# Patient Record
Sex: FEMALE | Race: WHITE | NOT HISPANIC OR LATINO | Employment: OTHER | RURAL
[De-identification: names, ages, dates, MRNs, and addresses within clinical notes are randomized per-mention and may not be internally consistent; named-entity substitution may affect disease eponyms.]

---

## 2020-08-28 ENCOUNTER — HISTORICAL (OUTPATIENT)
Dept: ADMINISTRATIVE | Facility: HOSPITAL | Age: 65
End: 2020-08-28

## 2021-05-18 RX ORDER — HYDROCODONE BITARTRATE AND ACETAMINOPHEN 10; 325 MG/1; MG/1
1 TABLET ORAL EVERY 8 HOURS
COMMUNITY
End: 2021-11-17

## 2021-05-18 RX ORDER — MONTELUKAST SODIUM 10 MG/1
10 TABLET ORAL NIGHTLY
COMMUNITY
End: 2021-06-07 | Stop reason: SDUPTHER

## 2021-05-18 RX ORDER — FUROSEMIDE 40 MG/1
40 TABLET ORAL DAILY
COMMUNITY
End: 2021-06-07 | Stop reason: SDUPTHER

## 2021-05-18 RX ORDER — CITALOPRAM 40 MG/1
40 TABLET, FILM COATED ORAL DAILY
COMMUNITY
End: 2021-06-07 | Stop reason: SDUPTHER

## 2021-05-18 RX ORDER — ROSUVASTATIN CALCIUM 40 MG/1
10 TABLET, COATED ORAL NIGHTLY
COMMUNITY
End: 2021-06-10

## 2021-05-18 RX ORDER — NAPROXEN SODIUM 220 MG/1
325 TABLET, FILM COATED ORAL DAILY
COMMUNITY
End: 2021-06-07

## 2021-05-18 RX ORDER — CETIRIZINE HYDROCHLORIDE 10 MG/1
10 TABLET ORAL DAILY
COMMUNITY
End: 2021-12-13 | Stop reason: SDUPTHER

## 2021-05-18 RX ORDER — MECLIZINE HYDROCHLORIDE 25 MG/1
25 TABLET ORAL 4 TIMES DAILY
COMMUNITY
End: 2021-12-13 | Stop reason: SDUPTHER

## 2021-05-18 RX ORDER — POTASSIUM CHLORIDE 20 MEQ/1
20 TABLET, EXTENDED RELEASE ORAL DAILY
COMMUNITY
End: 2021-06-07 | Stop reason: SDUPTHER

## 2021-05-18 RX ORDER — RANOLAZINE 1000 MG/1
500 TABLET, EXTENDED RELEASE ORAL 2 TIMES DAILY
COMMUNITY
End: 2021-12-13 | Stop reason: SDUPTHER

## 2021-05-18 RX ORDER — LISINOPRIL AND HYDROCHLOROTHIAZIDE 20; 25 MG/1; MG/1
1 TABLET ORAL DAILY
COMMUNITY
End: 2021-06-07 | Stop reason: SDUPTHER

## 2021-05-18 RX ORDER — CYCLOBENZAPRINE HCL 10 MG
10 TABLET ORAL EVERY 8 HOURS
COMMUNITY
End: 2021-06-28 | Stop reason: SDUPTHER

## 2021-05-18 RX ORDER — CLOPIDOGREL BISULFATE 75 MG/1
75 TABLET ORAL DAILY
COMMUNITY
End: 2021-06-07 | Stop reason: SDUPTHER

## 2021-05-18 RX ORDER — GABAPENTIN 600 MG/1
600 TABLET ORAL EVERY 6 HOURS
COMMUNITY
End: 2021-06-28 | Stop reason: SDUPTHER

## 2021-05-18 RX ORDER — CARVEDILOL 25 MG/1
25 TABLET ORAL 2 TIMES DAILY WITH MEALS
COMMUNITY
End: 2021-06-07 | Stop reason: SDUPTHER

## 2021-05-20 ENCOUNTER — OFFICE VISIT (OUTPATIENT)
Dept: PAIN MEDICINE | Facility: CLINIC | Age: 66
End: 2021-05-20
Payer: MEDICARE

## 2021-05-20 VITALS
RESPIRATION RATE: 18 BRPM | SYSTOLIC BLOOD PRESSURE: 158 MMHG | HEIGHT: 63 IN | BODY MASS INDEX: 29.95 KG/M2 | OXYGEN SATURATION: 98 % | WEIGHT: 169 LBS | HEART RATE: 68 BPM | DIASTOLIC BLOOD PRESSURE: 79 MMHG

## 2021-05-20 DIAGNOSIS — M54.17 LUMBOSACRAL RADICULOPATHY: Chronic | ICD-10-CM

## 2021-05-20 DIAGNOSIS — Z79.899 ENCOUNTER FOR LONG-TERM (CURRENT) USE OF OTHER MEDICATIONS: ICD-10-CM

## 2021-05-20 DIAGNOSIS — M54.12 CERVICAL RADICULOPATHY: Primary | Chronic | ICD-10-CM

## 2021-05-20 LAB
CTP QC/QA: YES
POC (AMP) AMPHETAMINE: NEGATIVE
POC (BAR) BARBITURATES: NEGATIVE
POC (BUP) BUPRENORPHINE: NEGATIVE
POC (BZO) BENZODIAZEPINES: NEGATIVE
POC (COC) COCAINE: NEGATIVE
POC (MDMA) METHYLENEDIOXYMETHAMPHETAMINE 3,4: NEGATIVE
POC (MET) METHAMPHETAMINE: NEGATIVE
POC (MOP) OPIATES: ABNORMAL
POC (MTD) METHADONE: NEGATIVE
POC (OXY) OXYCODONE: NEGATIVE
POC (PCP) PHENCYCLIDINE: NEGATIVE
POC (TCA) TRICYCLIC ANTIDEPRESSANTS: NEGATIVE
POC TEMPERATURE (URINE): 92
POC THC: NEGATIVE

## 2021-05-20 PROCEDURE — 80305 DRUG TEST PRSMV DIR OPT OBS: CPT | Mod: PBBFAC | Performed by: PHYSICIAN ASSISTANT

## 2021-05-20 PROCEDURE — 3008F BODY MASS INDEX DOCD: CPT | Mod: CPTII,,, | Performed by: PHYSICIAN ASSISTANT

## 2021-05-20 PROCEDURE — 99214 PR OFFICE/OUTPT VISIT, EST, LEVL IV, 30-39 MIN: ICD-10-PCS | Mod: S$PBB,,, | Performed by: PHYSICIAN ASSISTANT

## 2021-05-20 PROCEDURE — 99214 OFFICE O/P EST MOD 30 MIN: CPT | Mod: S$PBB,,, | Performed by: PHYSICIAN ASSISTANT

## 2021-05-20 PROCEDURE — 1125F AMNT PAIN NOTED PAIN PRSNT: CPT | Mod: ,,, | Performed by: PHYSICIAN ASSISTANT

## 2021-05-20 PROCEDURE — 3008F PR BODY MASS INDEX (BMI) DOCUMENTED: ICD-10-PCS | Mod: CPTII,,, | Performed by: PHYSICIAN ASSISTANT

## 2021-05-20 PROCEDURE — 99214 OFFICE O/P EST MOD 30 MIN: CPT | Mod: PBBFAC | Performed by: PHYSICIAN ASSISTANT

## 2021-05-20 PROCEDURE — 1125F PR PAIN SEVERITY QUANTIFIED, PAIN PRESENT: ICD-10-PCS | Mod: ,,, | Performed by: PHYSICIAN ASSISTANT

## 2021-05-20 RX ORDER — HYDROCODONE BITARTRATE AND ACETAMINOPHEN 10; 325 MG/1; MG/1
1 TABLET ORAL EVERY 8 HOURS
Qty: 90 TABLET | Refills: 0 | Status: SHIPPED | OUTPATIENT
Start: 2021-07-28 | End: 2021-08-27

## 2021-05-20 RX ORDER — HYDROCODONE BITARTRATE AND ACETAMINOPHEN 10; 325 MG/1; MG/1
1 TABLET ORAL EVERY 8 HOURS
Qty: 90 TABLET | Refills: 0 | Status: SHIPPED | OUTPATIENT
Start: 2021-06-28 | End: 2021-07-28

## 2021-05-20 RX ORDER — HYDROCODONE BITARTRATE AND ACETAMINOPHEN 10; 325 MG/1; MG/1
1 TABLET ORAL EVERY 8 HOURS
Qty: 90 TABLET | Refills: 0 | Status: SHIPPED | OUTPATIENT
Start: 2021-05-29 | End: 2021-06-28

## 2021-06-07 ENCOUNTER — OFFICE VISIT (OUTPATIENT)
Dept: PRIMARY CARE CLINIC | Facility: CLINIC | Age: 66
End: 2021-06-07
Payer: MEDICARE

## 2021-06-07 VITALS
BODY MASS INDEX: 28.88 KG/M2 | HEIGHT: 63 IN | OXYGEN SATURATION: 97 % | SYSTOLIC BLOOD PRESSURE: 105 MMHG | RESPIRATION RATE: 18 BRPM | DIASTOLIC BLOOD PRESSURE: 69 MMHG | HEART RATE: 61 BPM | WEIGHT: 163 LBS | TEMPERATURE: 98 F

## 2021-06-07 DIAGNOSIS — J30.2 SEASONAL ALLERGIES: ICD-10-CM

## 2021-06-07 DIAGNOSIS — E78.5 HYPERLIPIDEMIA, UNSPECIFIED HYPERLIPIDEMIA TYPE: ICD-10-CM

## 2021-06-07 DIAGNOSIS — I10 IDIOPATHIC HYPERTENSION: Primary | ICD-10-CM

## 2021-06-07 DIAGNOSIS — F32.A DEPRESSION, UNSPECIFIED DEPRESSION TYPE: ICD-10-CM

## 2021-06-07 DIAGNOSIS — R13.10 DYSPHAGIA, UNSPECIFIED TYPE: ICD-10-CM

## 2021-06-07 LAB
ALBUMIN SERPL BCP-MCNC: 3.5 G/DL (ref 3.5–5)
ALBUMIN/GLOB SERPL: 1.1 {RATIO}
ALP SERPL-CCNC: 58 U/L (ref 50–130)
ALT SERPL W P-5'-P-CCNC: 17 U/L (ref 13–56)
ANION GAP SERPL CALCULATED.3IONS-SCNC: 8 MMOL/L (ref 7–16)
AST SERPL W P-5'-P-CCNC: 14 U/L (ref 15–37)
BASOPHILS # BLD AUTO: 0.08 K/UL (ref 0–0.2)
BASOPHILS NFR BLD AUTO: 0.9 % (ref 0–1)
BILIRUB SERPL-MCNC: 0.3 MG/DL (ref 0–1.2)
BUN SERPL-MCNC: 8 MG/DL (ref 7–18)
BUN/CREAT SERPL: 10 (ref 6–20)
CALCIUM SERPL-MCNC: 8.8 MG/DL (ref 8.5–10.1)
CHLORIDE SERPL-SCNC: 101 MMOL/L (ref 98–107)
CHOLEST SERPL-MCNC: 112 MG/DL (ref 0–200)
CHOLEST/HDLC SERPL: 2.9 {RATIO}
CO2 SERPL-SCNC: 30 MMOL/L (ref 21–32)
CREAT SERPL-MCNC: 0.79 MG/DL (ref 0.55–1.02)
DIFFERENTIAL METHOD BLD: ABNORMAL
EOSINOPHIL # BLD AUTO: 0.05 K/UL (ref 0–0.5)
EOSINOPHIL NFR BLD AUTO: 0.5 % (ref 1–4)
ERYTHROCYTE [DISTWIDTH] IN BLOOD BY AUTOMATED COUNT: 13.9 % (ref 11.5–14.5)
GLOBULIN SER-MCNC: 3.2 G/DL (ref 2–4)
GLUCOSE SERPL-MCNC: 108 MG/DL (ref 74–106)
HCT VFR BLD AUTO: 43.5 % (ref 38–47)
HDLC SERPL-MCNC: 39 MG/DL (ref 40–60)
HGB BLD-MCNC: 14.6 G/DL (ref 12–16)
IMM GRANULOCYTES # BLD AUTO: 0.04 K/UL (ref 0–0.04)
IMM GRANULOCYTES NFR BLD: 0.4 % (ref 0–0.4)
LDLC SERPL CALC-MCNC: 42 MG/DL
LDLC/HDLC SERPL: 1.1 {RATIO}
LYMPHOCYTES # BLD AUTO: 3.53 K/UL (ref 1–4.8)
LYMPHOCYTES NFR BLD AUTO: 38.5 % (ref 27–41)
MCH RBC QN AUTO: 31 PG (ref 27–31)
MCHC RBC AUTO-ENTMCNC: 33.6 G/DL (ref 32–36)
MCV RBC AUTO: 92.4 FL (ref 80–96)
MONOCYTES # BLD AUTO: 0.66 K/UL (ref 0–0.8)
MONOCYTES NFR BLD AUTO: 7.2 % (ref 2–6)
MPC BLD CALC-MCNC: 10.8 FL (ref 9.4–12.4)
NEUTROPHILS # BLD AUTO: 4.82 K/UL (ref 1.8–7.7)
NEUTROPHILS NFR BLD AUTO: 52.5 % (ref 53–65)
NONHDLC SERPL-MCNC: 73 MG/DL
NRBC # BLD AUTO: 0 X10E3/UL
NRBC, AUTO (.00): 0 %
PLATELET # BLD AUTO: 239 K/UL (ref 150–400)
POTASSIUM SERPL-SCNC: 3.3 MMOL/L (ref 3.5–5.1)
PROT SERPL-MCNC: 6.7 G/DL (ref 6.4–8.2)
RBC # BLD AUTO: 4.71 M/UL (ref 4.2–5.4)
SODIUM SERPL-SCNC: 136 MMOL/L (ref 136–145)
TRIGL SERPL-MCNC: 155 MG/DL (ref 35–150)
VLDLC SERPL-MCNC: 31 MG/DL
WBC # BLD AUTO: 9.18 K/UL (ref 4.5–11)

## 2021-06-07 PROCEDURE — 80061 LIPID PANEL: CPT | Mod: ,,, | Performed by: CLINICAL MEDICAL LABORATORY

## 2021-06-07 PROCEDURE — 3008F BODY MASS INDEX DOCD: CPT | Mod: ,,, | Performed by: NURSE PRACTITIONER

## 2021-06-07 PROCEDURE — 3074F SYST BP LT 130 MM HG: CPT | Mod: ,,, | Performed by: NURSE PRACTITIONER

## 2021-06-07 PROCEDURE — 80061 LIPID PANEL: ICD-10-PCS | Mod: ,,, | Performed by: CLINICAL MEDICAL LABORATORY

## 2021-06-07 PROCEDURE — 80053 COMPREHENSIVE METABOLIC PANEL: ICD-10-PCS | Mod: ,,, | Performed by: CLINICAL MEDICAL LABORATORY

## 2021-06-07 PROCEDURE — 3078F PR MOST RECENT DIASTOLIC BLOOD PRESSURE < 80 MM HG: ICD-10-PCS | Mod: ,,, | Performed by: NURSE PRACTITIONER

## 2021-06-07 PROCEDURE — 3008F PR BODY MASS INDEX (BMI) DOCUMENTED: ICD-10-PCS | Mod: ,,, | Performed by: NURSE PRACTITIONER

## 2021-06-07 PROCEDURE — 85025 COMPLETE CBC W/AUTO DIFF WBC: CPT | Mod: ,,, | Performed by: CLINICAL MEDICAL LABORATORY

## 2021-06-07 PROCEDURE — 3078F DIAST BP <80 MM HG: CPT | Mod: ,,, | Performed by: NURSE PRACTITIONER

## 2021-06-07 PROCEDURE — 80053 COMPREHEN METABOLIC PANEL: CPT | Mod: ,,, | Performed by: CLINICAL MEDICAL LABORATORY

## 2021-06-07 PROCEDURE — 3074F PR MOST RECENT SYSTOLIC BLOOD PRESSURE < 130 MM HG: ICD-10-PCS | Mod: ,,, | Performed by: NURSE PRACTITIONER

## 2021-06-07 PROCEDURE — 99214 OFFICE O/P EST MOD 30 MIN: CPT | Mod: ,,, | Performed by: NURSE PRACTITIONER

## 2021-06-07 PROCEDURE — 85025 CBC WITH DIFFERENTIAL: ICD-10-PCS | Mod: ,,, | Performed by: CLINICAL MEDICAL LABORATORY

## 2021-06-07 PROCEDURE — 99214 PR OFFICE/OUTPT VISIT, EST, LEVL IV, 30-39 MIN: ICD-10-PCS | Mod: ,,, | Performed by: NURSE PRACTITIONER

## 2021-06-07 RX ORDER — CARVEDILOL 25 MG/1
25 TABLET ORAL 2 TIMES DAILY WITH MEALS
Qty: 180 TABLET | Refills: 1 | Status: SHIPPED | OUTPATIENT
Start: 2021-06-07 | End: 2021-11-01

## 2021-06-07 RX ORDER — ROSUVASTATIN CALCIUM 10 MG/1
10 TABLET, COATED ORAL DAILY
COMMUNITY
End: 2021-06-07 | Stop reason: SDUPTHER

## 2021-06-07 RX ORDER — LISINOPRIL AND HYDROCHLOROTHIAZIDE 20; 25 MG/1; MG/1
1 TABLET ORAL DAILY
Qty: 90 TABLET | Refills: 1 | Status: SHIPPED | OUTPATIENT
Start: 2021-06-07 | End: 2021-12-13 | Stop reason: SDUPTHER

## 2021-06-07 RX ORDER — LISINOPRIL 10 MG/1
10 TABLET ORAL DAILY
Qty: 90 TABLET | Refills: 1 | Status: SHIPPED | OUTPATIENT
Start: 2021-06-07 | End: 2022-03-17 | Stop reason: SDUPTHER

## 2021-06-07 RX ORDER — CITALOPRAM 40 MG/1
40 TABLET, FILM COATED ORAL DAILY
Qty: 90 TABLET | Refills: 1 | Status: SHIPPED | OUTPATIENT
Start: 2021-06-07 | End: 2021-11-01

## 2021-06-07 RX ORDER — ASPIRIN 325 MG
325 TABLET ORAL DAILY
COMMUNITY
End: 2023-04-12

## 2021-06-07 RX ORDER — LISINOPRIL 10 MG/1
10 TABLET ORAL DAILY
COMMUNITY
End: 2021-06-07 | Stop reason: SDUPTHER

## 2021-06-07 RX ORDER — MONTELUKAST SODIUM 10 MG/1
10 TABLET ORAL NIGHTLY
Qty: 90 TABLET | Refills: 1 | Status: SHIPPED | OUTPATIENT
Start: 2021-06-07 | End: 2021-11-01

## 2021-06-07 RX ORDER — FUROSEMIDE 40 MG/1
40 TABLET ORAL DAILY
Qty: 90 TABLET | Refills: 1 | Status: SHIPPED | OUTPATIENT
Start: 2021-06-07 | End: 2021-11-01

## 2021-06-07 RX ORDER — POTASSIUM CHLORIDE 20 MEQ/1
20 TABLET, EXTENDED RELEASE ORAL DAILY
Qty: 90 TABLET | Refills: 1 | Status: SHIPPED | OUTPATIENT
Start: 2021-06-07 | End: 2021-11-01

## 2021-06-07 RX ORDER — CLOPIDOGREL BISULFATE 75 MG/1
75 TABLET ORAL DAILY
Qty: 90 TABLET | Refills: 1 | Status: SHIPPED | OUTPATIENT
Start: 2021-06-07 | End: 2021-12-13 | Stop reason: SDUPTHER

## 2021-06-09 ENCOUNTER — TELEPHONE (OUTPATIENT)
Dept: PRIMARY CARE CLINIC | Facility: CLINIC | Age: 66
End: 2021-06-09

## 2021-06-10 RX ORDER — ROSUVASTATIN CALCIUM 40 MG/1
TABLET, COATED ORAL
Qty: 90 TABLET | Refills: 0 | Status: SHIPPED | OUTPATIENT
Start: 2021-06-10 | End: 2021-12-13 | Stop reason: SDUPTHER

## 2021-06-28 RX ORDER — CYCLOBENZAPRINE HCL 10 MG
10 TABLET ORAL EVERY 8 HOURS
Qty: 90 TABLET | Refills: 0 | Status: SHIPPED | OUTPATIENT
Start: 2021-06-28 | End: 2021-07-28 | Stop reason: SDUPTHER

## 2021-06-28 RX ORDER — GABAPENTIN 600 MG/1
600 TABLET ORAL EVERY 6 HOURS
Qty: 120 TABLET | Refills: 0 | Status: SHIPPED | OUTPATIENT
Start: 2021-06-28 | End: 2021-08-18 | Stop reason: SDUPTHER

## 2021-07-28 RX ORDER — CYCLOBENZAPRINE HCL 10 MG
10 TABLET ORAL EVERY 8 HOURS
Qty: 90 TABLET | Refills: 0 | Status: SHIPPED | OUTPATIENT
Start: 2021-07-28 | End: 2021-08-18 | Stop reason: SDUPTHER

## 2021-07-28 NOTE — TELEPHONE ENCOUNTER
----- Message from Kaley Jackson sent at 7/28/2021 12:29 PM CDT -----  Regarding: speak w/a nurse  Please call [phone].      thanks

## 2021-08-18 ENCOUNTER — OFFICE VISIT (OUTPATIENT)
Dept: PAIN MEDICINE | Facility: CLINIC | Age: 66
End: 2021-08-18
Payer: MEDICARE

## 2021-08-18 VITALS
HEIGHT: 63 IN | WEIGHT: 165 LBS | BODY MASS INDEX: 29.23 KG/M2 | SYSTOLIC BLOOD PRESSURE: 140 MMHG | DIASTOLIC BLOOD PRESSURE: 76 MMHG | HEART RATE: 83 BPM

## 2021-08-18 DIAGNOSIS — M96.1 POSTLAMINECTOMY SYNDROME, LUMBAR: Chronic | ICD-10-CM

## 2021-08-18 DIAGNOSIS — Z79.899 OTHER LONG TERM (CURRENT) DRUG THERAPY: Primary | ICD-10-CM

## 2021-08-18 DIAGNOSIS — M54.12 CERVICAL RADICULOPATHY: Chronic | ICD-10-CM

## 2021-08-18 DIAGNOSIS — M54.17 LUMBOSACRAL RADICULOPATHY: Chronic | ICD-10-CM

## 2021-08-18 LAB
CTP QC/QA: YES
POC (AMP) AMPHETAMINE: NEGATIVE
POC (BAR) BARBITURATES: NEGATIVE
POC (BUP) BUPRENORPHINE: NEGATIVE
POC (BZO) BENZODIAZEPINES: NEGATIVE
POC (COC) COCAINE: NEGATIVE
POC (MDMA) METHYLENEDIOXYMETHAMPHETAMINE 3,4: NEGATIVE
POC (MET) METHAMPHETAMINE: NEGATIVE
POC (MOP) OPIATES: ABNORMAL
POC (MTD) METHADONE: NEGATIVE
POC (OXY) OXYCODONE: NEGATIVE
POC (PCP) PHENCYCLIDINE: NEGATIVE
POC (TCA) TRICYCLIC ANTIDEPRESSANTS: NEGATIVE
POC TEMPERATURE (URINE): 90
POC THC: NEGATIVE

## 2021-08-18 PROCEDURE — 99215 OFFICE O/P EST HI 40 MIN: CPT | Mod: PBBFAC | Performed by: PAIN MEDICINE

## 2021-08-18 PROCEDURE — 3078F PR MOST RECENT DIASTOLIC BLOOD PRESSURE < 80 MM HG: ICD-10-PCS | Mod: CPTII,,, | Performed by: PAIN MEDICINE

## 2021-08-18 PROCEDURE — 99214 PR OFFICE/OUTPT VISIT, EST, LEVL IV, 30-39 MIN: ICD-10-PCS | Mod: S$PBB,,, | Performed by: PAIN MEDICINE

## 2021-08-18 PROCEDURE — 3077F SYST BP >= 140 MM HG: CPT | Mod: CPTII,,, | Performed by: PAIN MEDICINE

## 2021-08-18 PROCEDURE — 3008F PR BODY MASS INDEX (BMI) DOCUMENTED: ICD-10-PCS | Mod: CPTII,,, | Performed by: PAIN MEDICINE

## 2021-08-18 PROCEDURE — 80305 DRUG TEST PRSMV DIR OPT OBS: CPT | Mod: PBBFAC | Performed by: PAIN MEDICINE

## 2021-08-18 PROCEDURE — 1125F PR PAIN SEVERITY QUANTIFIED, PAIN PRESENT: ICD-10-PCS | Mod: CPTII,,, | Performed by: PAIN MEDICINE

## 2021-08-18 PROCEDURE — 1125F AMNT PAIN NOTED PAIN PRSNT: CPT | Mod: CPTII,,, | Performed by: PAIN MEDICINE

## 2021-08-18 PROCEDURE — 3078F DIAST BP <80 MM HG: CPT | Mod: CPTII,,, | Performed by: PAIN MEDICINE

## 2021-08-18 PROCEDURE — 1159F PR MEDICATION LIST DOCUMENTED IN MEDICAL RECORD: ICD-10-PCS | Mod: CPTII,,, | Performed by: PAIN MEDICINE

## 2021-08-18 PROCEDURE — 3008F BODY MASS INDEX DOCD: CPT | Mod: CPTII,,, | Performed by: PAIN MEDICINE

## 2021-08-18 PROCEDURE — 1159F MED LIST DOCD IN RCRD: CPT | Mod: CPTII,,, | Performed by: PAIN MEDICINE

## 2021-08-18 PROCEDURE — 99214 OFFICE O/P EST MOD 30 MIN: CPT | Mod: S$PBB,,, | Performed by: PAIN MEDICINE

## 2021-08-18 PROCEDURE — 3077F PR MOST RECENT SYSTOLIC BLOOD PRESSURE >= 140 MM HG: ICD-10-PCS | Mod: CPTII,,, | Performed by: PAIN MEDICINE

## 2021-08-18 RX ORDER — HYDROCODONE BITARTRATE AND ACETAMINOPHEN 10; 325 MG/1; MG/1
1 TABLET ORAL EVERY 8 HOURS PRN
Qty: 90 TABLET | Refills: 0 | Status: SHIPPED | OUTPATIENT
Start: 2021-08-27 | End: 2021-09-26

## 2021-08-18 RX ORDER — HYDROCODONE BITARTRATE AND ACETAMINOPHEN 10; 325 MG/1; MG/1
1 TABLET ORAL EVERY 8 HOURS PRN
Qty: 90 TABLET | Refills: 0 | Status: SHIPPED | OUTPATIENT
Start: 2021-10-26 | End: 2021-11-17 | Stop reason: SDUPTHER

## 2021-08-18 RX ORDER — CYCLOBENZAPRINE HCL 10 MG
10 TABLET ORAL EVERY 8 HOURS
Qty: 90 TABLET | Refills: 5 | Status: SHIPPED | OUTPATIENT
Start: 2021-08-18 | End: 2022-02-21 | Stop reason: SDUPTHER

## 2021-08-18 RX ORDER — GABAPENTIN 600 MG/1
600 TABLET ORAL EVERY 6 HOURS
Qty: 120 TABLET | Refills: 5 | Status: SHIPPED | OUTPATIENT
Start: 2021-08-18 | End: 2021-12-20

## 2021-08-18 RX ORDER — HYDROCODONE BITARTRATE AND ACETAMINOPHEN 10; 325 MG/1; MG/1
1 TABLET ORAL EVERY 8 HOURS PRN
Qty: 90 TABLET | Refills: 0 | Status: SHIPPED | OUTPATIENT
Start: 2021-09-26 | End: 2021-10-26

## 2021-11-18 ENCOUNTER — OFFICE VISIT (OUTPATIENT)
Dept: PAIN MEDICINE | Facility: CLINIC | Age: 66
End: 2021-11-18
Payer: MEDICARE

## 2021-11-18 VITALS
HEIGHT: 63 IN | BODY MASS INDEX: 30.83 KG/M2 | WEIGHT: 174 LBS | HEART RATE: 79 BPM | SYSTOLIC BLOOD PRESSURE: 157 MMHG | DIASTOLIC BLOOD PRESSURE: 86 MMHG | RESPIRATION RATE: 20 BRPM

## 2021-11-18 DIAGNOSIS — M54.17 LUMBOSACRAL RADICULOPATHY: Primary | Chronic | ICD-10-CM

## 2021-11-18 DIAGNOSIS — Z79.899 ENCOUNTER FOR LONG-TERM (CURRENT) USE OF OTHER MEDICATIONS: ICD-10-CM

## 2021-11-18 DIAGNOSIS — Z79.899 OTHER LONG TERM (CURRENT) DRUG THERAPY: ICD-10-CM

## 2021-11-18 DIAGNOSIS — M54.12 CERVICAL RADICULOPATHY: Chronic | ICD-10-CM

## 2021-11-18 LAB
CTP QC/QA: YES
POC (AMP) AMPHETAMINE: NEGATIVE
POC (BAR) BARBITURATES: NEGATIVE
POC (BUP) BUPRENORPHINE: NEGATIVE
POC (BZO) BENZODIAZEPINES: NEGATIVE
POC (COC) COCAINE: NEGATIVE
POC (MDMA) METHYLENEDIOXYMETHAMPHETAMINE 3,4: NEGATIVE
POC (MET) METHAMPHETAMINE: NEGATIVE
POC (MOP) OPIATES: ABNORMAL
POC (MTD) METHADONE: NEGATIVE
POC (OXY) OXYCODONE: NEGATIVE
POC (PCP) PHENCYCLIDINE: NEGATIVE
POC (TCA) TRICYCLIC ANTIDEPRESSANTS: NEGATIVE
POC TEMPERATURE (URINE): 94
POC THC: NEGATIVE

## 2021-11-18 PROCEDURE — 3008F BODY MASS INDEX DOCD: CPT | Mod: CPTII,,, | Performed by: PHYSICIAN ASSISTANT

## 2021-11-18 PROCEDURE — 3079F DIAST BP 80-89 MM HG: CPT | Mod: CPTII,,, | Performed by: PHYSICIAN ASSISTANT

## 2021-11-18 PROCEDURE — 1159F MED LIST DOCD IN RCRD: CPT | Mod: CPTII,,, | Performed by: PHYSICIAN ASSISTANT

## 2021-11-18 PROCEDURE — 80305 DRUG TEST PRSMV DIR OPT OBS: CPT | Mod: PBBFAC | Performed by: PHYSICIAN ASSISTANT

## 2021-11-18 PROCEDURE — 1159F PR MEDICATION LIST DOCUMENTED IN MEDICAL RECORD: ICD-10-PCS | Mod: CPTII,,, | Performed by: PHYSICIAN ASSISTANT

## 2021-11-18 PROCEDURE — 3077F PR MOST RECENT SYSTOLIC BLOOD PRESSURE >= 140 MM HG: ICD-10-PCS | Mod: CPTII,,, | Performed by: PHYSICIAN ASSISTANT

## 2021-11-18 PROCEDURE — 4010F PR ACE/ARB THEARPY RXD/TAKEN: ICD-10-PCS | Mod: CPTII,,, | Performed by: PHYSICIAN ASSISTANT

## 2021-11-18 PROCEDURE — 99214 OFFICE O/P EST MOD 30 MIN: CPT | Mod: S$PBB,,, | Performed by: PHYSICIAN ASSISTANT

## 2021-11-18 PROCEDURE — 3008F PR BODY MASS INDEX (BMI) DOCUMENTED: ICD-10-PCS | Mod: CPTII,,, | Performed by: PHYSICIAN ASSISTANT

## 2021-11-18 PROCEDURE — 3077F SYST BP >= 140 MM HG: CPT | Mod: CPTII,,, | Performed by: PHYSICIAN ASSISTANT

## 2021-11-18 PROCEDURE — 4010F ACE/ARB THERAPY RXD/TAKEN: CPT | Mod: CPTII,,, | Performed by: PHYSICIAN ASSISTANT

## 2021-11-18 PROCEDURE — 3079F PR MOST RECENT DIASTOLIC BLOOD PRESSURE 80-89 MM HG: ICD-10-PCS | Mod: CPTII,,, | Performed by: PHYSICIAN ASSISTANT

## 2021-11-18 PROCEDURE — 99214 PR OFFICE/OUTPT VISIT, EST, LEVL IV, 30-39 MIN: ICD-10-PCS | Mod: S$PBB,,, | Performed by: PHYSICIAN ASSISTANT

## 2021-11-18 PROCEDURE — 99215 OFFICE O/P EST HI 40 MIN: CPT | Mod: PBBFAC | Performed by: PHYSICIAN ASSISTANT

## 2021-11-18 RX ORDER — HYDROCODONE BITARTRATE AND ACETAMINOPHEN 10; 325 MG/1; MG/1
1 TABLET ORAL EVERY 8 HOURS
Qty: 90 TABLET | Refills: 0 | Status: SHIPPED | OUTPATIENT
Start: 2021-12-26 | End: 2022-01-25

## 2021-11-18 RX ORDER — HYDROCODONE BITARTRATE AND ACETAMINOPHEN 10; 325 MG/1; MG/1
1 TABLET ORAL EVERY 8 HOURS
Qty: 90 TABLET | Refills: 0 | Status: SHIPPED | OUTPATIENT
Start: 2022-01-25 | End: 2022-02-16 | Stop reason: SDUPTHER

## 2021-11-18 RX ORDER — HYDROCODONE BITARTRATE AND ACETAMINOPHEN 10; 325 MG/1; MG/1
1 TABLET ORAL EVERY 8 HOURS
Qty: 90 TABLET | Refills: 0 | Status: SHIPPED | OUTPATIENT
Start: 2021-11-26 | End: 2021-12-26

## 2021-12-13 DIAGNOSIS — R42 VERTIGO: Primary | ICD-10-CM

## 2021-12-13 DIAGNOSIS — J30.9 ALLERGIC RHINITIS, UNSPECIFIED SEASONALITY, UNSPECIFIED TRIGGER: ICD-10-CM

## 2021-12-13 DIAGNOSIS — I10 IDIOPATHIC HYPERTENSION: ICD-10-CM

## 2021-12-13 RX ORDER — CLOPIDOGREL BISULFATE 75 MG/1
75 TABLET ORAL DAILY
Qty: 90 TABLET | Refills: 1 | Status: SHIPPED | OUTPATIENT
Start: 2021-12-13 | End: 2021-12-13

## 2021-12-13 RX ORDER — MECLIZINE HYDROCHLORIDE 25 MG/1
25 TABLET ORAL 4 TIMES DAILY
Qty: 30 TABLET | Refills: 0 | Status: SHIPPED | OUTPATIENT
Start: 2021-12-13 | End: 2021-12-13

## 2021-12-13 RX ORDER — MECLIZINE HYDROCHLORIDE 25 MG/1
25 TABLET ORAL 4 TIMES DAILY
Qty: 30 TABLET | Refills: 0 | Status: SHIPPED | OUTPATIENT
Start: 2021-12-13 | End: 2022-03-17 | Stop reason: SDUPTHER

## 2021-12-13 RX ORDER — LISINOPRIL AND HYDROCHLOROTHIAZIDE 20; 25 MG/1; MG/1
1 TABLET ORAL DAILY
Qty: 90 TABLET | Refills: 1 | Status: SHIPPED | OUTPATIENT
Start: 2021-12-13 | End: 2021-12-13

## 2021-12-13 RX ORDER — CETIRIZINE HYDROCHLORIDE 10 MG/1
10 TABLET ORAL DAILY
Qty: 30 TABLET | Refills: 1 | Status: SHIPPED | OUTPATIENT
Start: 2021-12-13 | End: 2022-03-17 | Stop reason: SDUPTHER

## 2021-12-13 RX ORDER — ROSUVASTATIN CALCIUM 40 MG/1
40 TABLET, COATED ORAL NIGHTLY
Qty: 90 TABLET | Refills: 0 | Status: SHIPPED | OUTPATIENT
Start: 2021-12-13 | End: 2021-12-13

## 2021-12-13 RX ORDER — ROSUVASTATIN CALCIUM 40 MG/1
40 TABLET, COATED ORAL NIGHTLY
Qty: 90 TABLET | Refills: 0 | Status: SHIPPED | OUTPATIENT
Start: 2021-12-13 | End: 2022-03-17 | Stop reason: SDUPTHER

## 2021-12-13 RX ORDER — CLOPIDOGREL BISULFATE 75 MG/1
75 TABLET ORAL DAILY
Qty: 90 TABLET | Refills: 1 | Status: SHIPPED | OUTPATIENT
Start: 2021-12-13 | End: 2022-03-17 | Stop reason: SDUPTHER

## 2021-12-13 RX ORDER — LISINOPRIL AND HYDROCHLOROTHIAZIDE 20; 25 MG/1; MG/1
1 TABLET ORAL DAILY
Qty: 90 TABLET | Refills: 1 | Status: SHIPPED | OUTPATIENT
Start: 2021-12-13 | End: 2022-03-17 | Stop reason: SDUPTHER

## 2021-12-13 RX ORDER — RANOLAZINE 1000 MG/1
1000 TABLET, EXTENDED RELEASE ORAL DAILY
Qty: 90 TABLET | Refills: 0 | Status: SHIPPED | OUTPATIENT
Start: 2021-12-13 | End: 2021-12-13

## 2021-12-13 RX ORDER — RANOLAZINE 1000 MG/1
1000 TABLET, EXTENDED RELEASE ORAL DAILY
Qty: 90 TABLET | Refills: 0 | Status: SHIPPED | OUTPATIENT
Start: 2021-12-13 | End: 2022-03-17 | Stop reason: ALTCHOICE

## 2021-12-13 RX ORDER — CETIRIZINE HYDROCHLORIDE 10 MG/1
10 TABLET ORAL DAILY
Qty: 30 TABLET | Refills: 1 | Status: SHIPPED | OUTPATIENT
Start: 2021-12-13 | End: 2021-12-13

## 2022-02-16 NOTE — PROGRESS NOTES
Disclaimer:  This note has been generated using voice recognition software.  There may be type of graft focal areas that have been missed during a proof reading      Subjective:      Patient ID: Ade Grant is a 67 y.o. female.    Chief Complaint: Hip Pain (left)      Pain  This is a chronic problem. The current episode started more than 1 year ago. The problem occurs daily. The problem has been waxing and waning. Associated symptoms include arthralgias, myalgias and neck pain. Pertinent negatives include no change in bowel habit, chest pain, chills, coughing, diaphoresis, fatigue, fever, rash, sore throat, vertigo or vomiting.     Review of Systems   Constitutional: Negative for activity change, appetite change, chills, diaphoresis, fatigue, fever and unexpected weight change.   HENT: Negative for drooling, ear discharge, ear pain, facial swelling, nosebleeds, sore throat, trouble swallowing, voice change and goiter.    Eyes: Negative for photophobia, pain, discharge, redness and visual disturbance.   Respiratory: Negative for apnea, cough, choking, chest tightness, shortness of breath, wheezing and stridor.    Cardiovascular: Negative for chest pain, palpitations and leg swelling.   Gastrointestinal: Negative for abdominal distention, change in bowel habit, diarrhea, rectal pain, vomiting, fecal incontinence and change in bowel habit.   Endocrine: Negative for cold intolerance, heat intolerance, polydipsia, polyphagia and polyuria.   Genitourinary: Negative for bladder incontinence, dysuria, flank pain, frequency and hot flashes.   Musculoskeletal: Positive for arthralgias, back pain, leg pain, myalgias and neck pain.   Integumentary:  Negative for color change, pallor and rash.   Allergic/Immunologic: Negative for immunocompromised state.   Neurological: Negative for dizziness, vertigo, seizures, syncope, facial asymmetry, speech difficulty, light-headedness, disturbances in coordination, memory loss and  "coordination difficulties.   Hematological: Negative for adenopathy. Does not bruise/bleed easily.   Psychiatric/Behavioral: Negative for agitation, behavioral problems, confusion, decreased concentration, dysphoric mood, hallucinations, self-injury and suicidal ideas. The patient is not nervous/anxious and is not hyperactive.             Objective:  Vitals:    02/21/22 1312 02/21/22 1314   BP: 139/82    Pulse: 80    Resp: 20    Weight: 77.6 kg (171 lb)    Height: 5' 3" (1.6 m)    PainSc:   4   4         Physical Exam  Vitals and nursing note reviewed. Exam conducted with a chaperone present.   Constitutional:       General: She is awake. She is not in acute distress.     Appearance: Normal appearance. She is not ill-appearing, toxic-appearing or diaphoretic.   HENT:      Head: Normocephalic and atraumatic.      Nose: Nose normal.      Mouth/Throat:      Mouth: Mucous membranes are moist.      Pharynx: Oropharynx is clear.   Eyes:      Conjunctiva/sclera: Conjunctivae normal.      Pupils: Pupils are equal, round, and reactive to light.   Cardiovascular:      Rate and Rhythm: Normal rate.   Pulmonary:      Effort: Pulmonary effort is normal. No respiratory distress.   Abdominal:      Palpations: Abdomen is soft.      Tenderness: There is no guarding.   Musculoskeletal:         General: Normal range of motion.      Cervical back: Normal range of motion and neck supple. Tenderness present. No rigidity.      Thoracic back: Tenderness present.      Lumbar back: Tenderness present.   Skin:     General: Skin is warm and dry.      Coloration: Skin is not jaundiced or pale.   Neurological:      General: No focal deficit present.      Mental Status: She is alert and oriented to person, place, and time. Mental status is at baseline.      Cranial Nerves: Cranial nerves are intact. No cranial nerve deficit (II-XII).   Psychiatric:         Mood and Affect: Mood normal.         Behavior: Behavior normal. Behavior is cooperative.   "       Thought Content: Thought content normal.           DXA Bone Density Appendicular Skeleton  Narrative: BONE DENSITOMETRY    INDICATION: Post-menopausal     COMPARISON: None.    TECHNIQUE: Bone densitometry was performed with evaluation of the lumbar  spine and left hip.    FINDINGS:     Right forearm total:  BMD: 0.494 g/cm^2  T-score: -1.6  Z-score: -0.1    Right hip neck:    BMD: 0.731 g/cm^2  T-score: -1.1  Z-score: 0.4  Impression: IMPRESSION: Findings consistent with osteopenia.     Place of service: [facility].    This report has been electronically signed by Ambrocio Perdomo MD       Office Visit on 11/18/2021   Component Date Value Ref Range Status    POC Amphetamines 11/18/2021 Negative  Negative, Inconclusive Final    POC Barbiturates 11/18/2021 Negative  Negative, Inconclusive Final    POC Benzodiazepines 11/18/2021 Negative  Negative, Inconclusive Final    POC Cocaine 11/18/2021 Negative  Negative, Inconclusive Final    POC THC 11/18/2021 Negative  Negative, Inconclusive Final    POC Methadone 11/18/2021 Negative  Negative, Inconclusive Final    POC Methamphetamine 11/18/2021 Negative  Negative, Inconclusive Final    POC Opiates 11/18/2021 Presumptive Positive (A) Negative, Inconclusive Final    POC Oxycodone 11/18/2021 Negative  Negative, Inconclusive Final    POC Phencyclidine 11/18/2021 Negative  Negative, Inconclusive Final    POC Methylenedioxymethamphetamine * 11/18/2021 Negative  Negative, Inconclusive Final    POC Tricyclic Antidepressants 11/18/2021 Negative  Negative, Inconclusive Final    POC Buprenorphine 11/18/2021 Negative   Final     Acceptable 11/18/2021 Yes   Final    POC Temperature (Urine) 11/18/2021 94   Final           Assessment:      1. Lumbosacral radiculopathy    2. Cervical radiculopathy    3. Encounter for long-term (current) use of other medications    4. Back muscle spasm          Orders Placed This Encounter   Procedures    POCT Urine  Drug Screen Presump     Interpretive Information:     Negative:  No drug detected at the cut off level.   Positive:  This result represents presumptive positive for the   tested drug, other substances may yield a positive response other   than the analyte of interest. This result should be utilized for   diagnostic purpose only. Confirmation testing will be performed upon physician request only.            A's of Opioid Risk Assessment  Activity:Patient can perform ADL.   Analgesia:Patients pain is partially controlled by current medication. Patient has tried OTC medications such as Tylenol and Ibuprofen with out relief.   Adverse Effects: Patient denies constipation or sedation.  Aberrant Behavior:  reviewed with no aberrant drug seeking/taking behavior.  Overdose reversal drug naloxone discussed    Drug screen reviewed      Requested Prescriptions     Signed Prescriptions Disp Refills    HYDROcodone-acetaminophen (NORCO)  mg per tablet 90 tablet 0     Sig: Take 1 tablet by mouth every 8 (eight) hours.    HYDROcodone-acetaminophen (NORCO)  mg per tablet 90 tablet 0     Sig: Take 1 tablet by mouth every 8 (eight) hours.    HYDROcodone-acetaminophen (NORCO)  mg per tablet 90 tablet 0     Sig: Take 1 tablet by mouth every 8 (eight) hours.    gabapentin (NEURONTIN) 600 MG tablet 90 tablet 2     Sig: Take 1 tablet (600 mg total) by mouth every 8 (eight) hours.    cyclobenzaprine (FLEXERIL) 10 MG tablet 90 tablet 2     Sig: Take 1 tablet (10 mg total) by mouth every 8 (eight) hours.         Plan:    History lumbar surgery x2    Doing well current medication    She would like to continue with conservative management    She would like to continue conservative management    Continue current medication    Follow-up 3 months    Dr. Armas, August 2022      Bring original prescription medication bottles/container/box with labels to each visit

## 2022-02-21 ENCOUNTER — OFFICE VISIT (OUTPATIENT)
Dept: PAIN MEDICINE | Facility: CLINIC | Age: 67
End: 2022-02-21
Payer: MEDICARE

## 2022-02-21 VITALS
SYSTOLIC BLOOD PRESSURE: 139 MMHG | WEIGHT: 171 LBS | RESPIRATION RATE: 20 BRPM | HEIGHT: 63 IN | BODY MASS INDEX: 30.3 KG/M2 | DIASTOLIC BLOOD PRESSURE: 82 MMHG | HEART RATE: 80 BPM

## 2022-02-21 DIAGNOSIS — M62.830 BACK MUSCLE SPASM: ICD-10-CM

## 2022-02-21 DIAGNOSIS — M54.17 LUMBOSACRAL RADICULOPATHY: Primary | Chronic | ICD-10-CM

## 2022-02-21 DIAGNOSIS — Z79.899 ENCOUNTER FOR LONG-TERM (CURRENT) USE OF OTHER MEDICATIONS: ICD-10-CM

## 2022-02-21 DIAGNOSIS — M54.12 CERVICAL RADICULOPATHY: Chronic | ICD-10-CM

## 2022-02-21 LAB
CTP QC/QA: YES
POC (AMP) AMPHETAMINE: NEGATIVE
POC (BAR) BARBITURATES: NEGATIVE
POC (BUP) BUPRENORPHINE: NEGATIVE
POC (BZO) BENZODIAZEPINES: NEGATIVE
POC (COC) COCAINE: NEGATIVE
POC (MDMA) METHYLENEDIOXYMETHAMPHETAMINE 3,4: NEGATIVE
POC (MET) METHAMPHETAMINE: NEGATIVE
POC (MOP) OPIATES: ABNORMAL
POC (MTD) METHADONE: NEGATIVE
POC (OXY) OXYCODONE: NEGATIVE
POC (PCP) PHENCYCLIDINE: NEGATIVE
POC (TCA) TRICYCLIC ANTIDEPRESSANTS: NEGATIVE
POC TEMPERATURE (URINE): 96
POC THC: NEGATIVE

## 2022-02-21 PROCEDURE — 1101F PR PT FALLS ASSESS DOC 0-1 FALLS W/OUT INJ PAST YR: ICD-10-PCS | Mod: CPTII,,, | Performed by: PHYSICIAN ASSISTANT

## 2022-02-21 PROCEDURE — 1159F MED LIST DOCD IN RCRD: CPT | Mod: CPTII,,, | Performed by: PHYSICIAN ASSISTANT

## 2022-02-21 PROCEDURE — 3075F SYST BP GE 130 - 139MM HG: CPT | Mod: CPTII,,, | Performed by: PHYSICIAN ASSISTANT

## 2022-02-21 PROCEDURE — 99214 OFFICE O/P EST MOD 30 MIN: CPT | Mod: S$PBB,,, | Performed by: PHYSICIAN ASSISTANT

## 2022-02-21 PROCEDURE — 3079F DIAST BP 80-89 MM HG: CPT | Mod: CPTII,,, | Performed by: PHYSICIAN ASSISTANT

## 2022-02-21 PROCEDURE — 1159F PR MEDICATION LIST DOCUMENTED IN MEDICAL RECORD: ICD-10-PCS | Mod: CPTII,,, | Performed by: PHYSICIAN ASSISTANT

## 2022-02-21 PROCEDURE — 3008F PR BODY MASS INDEX (BMI) DOCUMENTED: ICD-10-PCS | Mod: CPTII,,, | Performed by: PHYSICIAN ASSISTANT

## 2022-02-21 PROCEDURE — 4010F PR ACE/ARB THEARPY RXD/TAKEN: ICD-10-PCS | Mod: CPTII,,, | Performed by: PHYSICIAN ASSISTANT

## 2022-02-21 PROCEDURE — 4010F ACE/ARB THERAPY RXD/TAKEN: CPT | Mod: CPTII,,, | Performed by: PHYSICIAN ASSISTANT

## 2022-02-21 PROCEDURE — 1101F PT FALLS ASSESS-DOCD LE1/YR: CPT | Mod: CPTII,,, | Performed by: PHYSICIAN ASSISTANT

## 2022-02-21 PROCEDURE — 99214 OFFICE O/P EST MOD 30 MIN: CPT | Mod: PBBFAC | Performed by: PHYSICIAN ASSISTANT

## 2022-02-21 PROCEDURE — 3008F BODY MASS INDEX DOCD: CPT | Mod: CPTII,,, | Performed by: PHYSICIAN ASSISTANT

## 2022-02-21 PROCEDURE — 3075F PR MOST RECENT SYSTOLIC BLOOD PRESS GE 130-139MM HG: ICD-10-PCS | Mod: CPTII,,, | Performed by: PHYSICIAN ASSISTANT

## 2022-02-21 PROCEDURE — 3079F PR MOST RECENT DIASTOLIC BLOOD PRESSURE 80-89 MM HG: ICD-10-PCS | Mod: CPTII,,, | Performed by: PHYSICIAN ASSISTANT

## 2022-02-21 PROCEDURE — 3288F PR FALLS RISK ASSESSMENT DOCUMENTED: ICD-10-PCS | Mod: CPTII,,, | Performed by: PHYSICIAN ASSISTANT

## 2022-02-21 PROCEDURE — 80305 DRUG TEST PRSMV DIR OPT OBS: CPT | Mod: PBBFAC | Performed by: PHYSICIAN ASSISTANT

## 2022-02-21 PROCEDURE — 99214 PR OFFICE/OUTPT VISIT, EST, LEVL IV, 30-39 MIN: ICD-10-PCS | Mod: S$PBB,,, | Performed by: PHYSICIAN ASSISTANT

## 2022-02-21 PROCEDURE — 3288F FALL RISK ASSESSMENT DOCD: CPT | Mod: CPTII,,, | Performed by: PHYSICIAN ASSISTANT

## 2022-02-21 RX ORDER — GABAPENTIN 600 MG/1
600 TABLET ORAL EVERY 8 HOURS
Qty: 90 TABLET | Refills: 2 | Status: SHIPPED | OUTPATIENT
Start: 2022-02-21 | End: 2022-05-18 | Stop reason: SDUPTHER

## 2022-02-21 RX ORDER — HYDROCODONE BITARTRATE AND ACETAMINOPHEN 10; 325 MG/1; MG/1
1 TABLET ORAL EVERY 8 HOURS
Qty: 90 TABLET | Refills: 0 | Status: SHIPPED | OUTPATIENT
Start: 2022-02-25 | End: 2022-05-19 | Stop reason: SDUPTHER

## 2022-02-21 RX ORDER — CYCLOBENZAPRINE HCL 10 MG
10 TABLET ORAL EVERY 8 HOURS
Qty: 90 TABLET | Refills: 2 | Status: SHIPPED | OUTPATIENT
Start: 2022-02-21 | End: 2022-04-27

## 2022-02-21 RX ORDER — HYDROCODONE BITARTRATE AND ACETAMINOPHEN 10; 325 MG/1; MG/1
1 TABLET ORAL EVERY 8 HOURS
Qty: 90 TABLET | Refills: 0 | Status: SHIPPED | OUTPATIENT
Start: 2022-03-27 | End: 2022-03-18 | Stop reason: SDUPTHER

## 2022-02-21 RX ORDER — HYDROCODONE BITARTRATE AND ACETAMINOPHEN 10; 325 MG/1; MG/1
1 TABLET ORAL EVERY 8 HOURS
Qty: 90 TABLET | Refills: 0 | Status: SHIPPED | OUTPATIENT
Start: 2022-04-26 | End: 2022-03-18 | Stop reason: SDUPTHER

## 2022-03-17 ENCOUNTER — OFFICE VISIT (OUTPATIENT)
Dept: PRIMARY CARE CLINIC | Facility: CLINIC | Age: 67
End: 2022-03-17
Payer: MEDICARE

## 2022-03-17 VITALS
DIASTOLIC BLOOD PRESSURE: 71 MMHG | WEIGHT: 168.81 LBS | BODY MASS INDEX: 29.9 KG/M2 | OXYGEN SATURATION: 94 % | SYSTOLIC BLOOD PRESSURE: 105 MMHG | RESPIRATION RATE: 20 BRPM | TEMPERATURE: 98 F | HEART RATE: 73 BPM

## 2022-03-17 DIAGNOSIS — Z23 ENCOUNTER FOR ADMINISTRATION OF VACCINE: ICD-10-CM

## 2022-03-17 DIAGNOSIS — F32.A DEPRESSION, UNSPECIFIED DEPRESSION TYPE: ICD-10-CM

## 2022-03-17 DIAGNOSIS — J30.9 ALLERGIC RHINITIS, UNSPECIFIED SEASONALITY, UNSPECIFIED TRIGGER: ICD-10-CM

## 2022-03-17 DIAGNOSIS — E78.5 HYPERLIPIDEMIA, UNSPECIFIED HYPERLIPIDEMIA TYPE: ICD-10-CM

## 2022-03-17 DIAGNOSIS — I10 IDIOPATHIC HYPERTENSION: ICD-10-CM

## 2022-03-17 DIAGNOSIS — F17.200 SMOKING ADDICTION: ICD-10-CM

## 2022-03-17 DIAGNOSIS — R42 VERTIGO: ICD-10-CM

## 2022-03-17 DIAGNOSIS — G89.4 CHRONIC PAIN SYNDROME: ICD-10-CM

## 2022-03-17 DIAGNOSIS — J30.2 SEASONAL ALLERGIES: ICD-10-CM

## 2022-03-17 DIAGNOSIS — I10 WELL-CONTROLLED HYPERTENSION: Primary | ICD-10-CM

## 2022-03-17 LAB
ALBUMIN SERPL BCP-MCNC: 3.5 G/DL (ref 3.5–5)
ALBUMIN/GLOB SERPL: 1 {RATIO}
ALP SERPL-CCNC: 76 U/L (ref 55–142)
ALT SERPL W P-5'-P-CCNC: 23 U/L (ref 13–56)
ANION GAP SERPL CALCULATED.3IONS-SCNC: 9 MMOL/L (ref 7–16)
AST SERPL W P-5'-P-CCNC: 13 U/L (ref 15–37)
BASOPHILS # BLD AUTO: 0.08 K/UL (ref 0–0.2)
BASOPHILS NFR BLD AUTO: 0.9 % (ref 0–1)
BILIRUB SERPL-MCNC: 0.5 MG/DL (ref 0–1.2)
BUN SERPL-MCNC: 15 MG/DL (ref 7–18)
BUN/CREAT SERPL: 18 (ref 6–20)
CALCIUM SERPL-MCNC: 8.9 MG/DL (ref 8.5–10.1)
CHLORIDE SERPL-SCNC: 102 MMOL/L (ref 98–107)
CHOLEST SERPL-MCNC: 97 MG/DL (ref 0–200)
CHOLEST/HDLC SERPL: 3.1 {RATIO}
CO2 SERPL-SCNC: 29 MMOL/L (ref 21–32)
CREAT SERPL-MCNC: 0.82 MG/DL (ref 0.55–1.02)
DIFFERENTIAL METHOD BLD: ABNORMAL
EOSINOPHIL # BLD AUTO: 0.07 K/UL (ref 0–0.5)
EOSINOPHIL NFR BLD AUTO: 0.8 % (ref 1–4)
ERYTHROCYTE [DISTWIDTH] IN BLOOD BY AUTOMATED COUNT: 14.2 % (ref 11.5–14.5)
GLOBULIN SER-MCNC: 3.6 G/DL (ref 2–4)
GLUCOSE SERPL-MCNC: 114 MG/DL (ref 74–106)
HCT VFR BLD AUTO: 47.6 % (ref 38–47)
HDLC SERPL-MCNC: 31 MG/DL (ref 40–60)
HGB BLD-MCNC: 15.7 G/DL (ref 12–16)
IMM GRANULOCYTES # BLD AUTO: 0.02 K/UL (ref 0–0.04)
IMM GRANULOCYTES NFR BLD: 0.2 % (ref 0–0.4)
LDLC SERPL CALC-MCNC: 23 MG/DL
LDLC/HDLC SERPL: 0.7 {RATIO}
LYMPHOCYTES # BLD AUTO: 3.81 K/UL (ref 1–4.8)
LYMPHOCYTES NFR BLD AUTO: 42.9 % (ref 27–41)
MCH RBC QN AUTO: 29.7 PG (ref 27–31)
MCHC RBC AUTO-ENTMCNC: 33 G/DL (ref 32–36)
MCV RBC AUTO: 90.2 FL (ref 80–96)
MONOCYTES # BLD AUTO: 0.54 K/UL (ref 0–0.8)
MONOCYTES NFR BLD AUTO: 6.1 % (ref 2–6)
MPC BLD CALC-MCNC: 11.2 FL (ref 9.4–12.4)
NEUTROPHILS # BLD AUTO: 4.37 K/UL (ref 1.8–7.7)
NEUTROPHILS NFR BLD AUTO: 49.1 % (ref 53–65)
NONHDLC SERPL-MCNC: 66 MG/DL
NRBC # BLD AUTO: 0 X10E3/UL
NRBC, AUTO (.00): 0 %
PLATELET # BLD AUTO: 216 K/UL (ref 150–400)
POTASSIUM SERPL-SCNC: 3.2 MMOL/L (ref 3.5–5.1)
PROT SERPL-MCNC: 7.1 G/DL (ref 6.4–8.2)
RBC # BLD AUTO: 5.28 M/UL (ref 4.2–5.4)
SODIUM SERPL-SCNC: 137 MMOL/L (ref 136–145)
TRIGL SERPL-MCNC: 213 MG/DL (ref 35–150)
VLDLC SERPL-MCNC: 43 MG/DL
WBC # BLD AUTO: 8.89 K/UL (ref 4.5–11)

## 2022-03-17 PROCEDURE — 96372 PR INJECTION,THERAP/PROPH/DIAG2ST, IM OR SUBCUT: ICD-10-PCS | Mod: ,,, | Performed by: NURSE PRACTITIONER

## 2022-03-17 PROCEDURE — G0008 ADMIN INFLUENZA VIRUS VAC: HCPCS | Mod: ,,, | Performed by: NURSE PRACTITIONER

## 2022-03-17 PROCEDURE — 99214 OFFICE O/P EST MOD 30 MIN: CPT | Mod: 25,,, | Performed by: NURSE PRACTITIONER

## 2022-03-17 PROCEDURE — 99214 PR OFFICE/OUTPT VISIT, EST, LEVL IV, 30-39 MIN: ICD-10-PCS | Mod: 25,,, | Performed by: NURSE PRACTITIONER

## 2022-03-17 PROCEDURE — 3008F PR BODY MASS INDEX (BMI) DOCUMENTED: ICD-10-PCS | Mod: ,,, | Performed by: NURSE PRACTITIONER

## 2022-03-17 PROCEDURE — 80061 LIPID PANEL: ICD-10-PCS | Mod: ,,, | Performed by: CLINICAL MEDICAL LABORATORY

## 2022-03-17 PROCEDURE — G0008 PR ADMIN INFLUENZA VIRUS VAC: ICD-10-PCS | Mod: ,,, | Performed by: NURSE PRACTITIONER

## 2022-03-17 PROCEDURE — 4010F ACE/ARB THERAPY RXD/TAKEN: CPT | Mod: ,,, | Performed by: NURSE PRACTITIONER

## 2022-03-17 PROCEDURE — 80061 LIPID PANEL: CPT | Mod: ,,, | Performed by: CLINICAL MEDICAL LABORATORY

## 2022-03-17 PROCEDURE — 3074F SYST BP LT 130 MM HG: CPT | Mod: ,,, | Performed by: NURSE PRACTITIONER

## 2022-03-17 PROCEDURE — 4010F PR ACE/ARB THEARPY RXD/TAKEN: ICD-10-PCS | Mod: ,,, | Performed by: NURSE PRACTITIONER

## 2022-03-17 PROCEDURE — 3288F PR FALLS RISK ASSESSMENT DOCUMENTED: ICD-10-PCS | Mod: ,,, | Performed by: NURSE PRACTITIONER

## 2022-03-17 PROCEDURE — 90686 IIV4 VACC NO PRSV 0.5 ML IM: CPT | Mod: ,,, | Performed by: NURSE PRACTITIONER

## 2022-03-17 PROCEDURE — 80053 COMPREHEN METABOLIC PANEL: CPT | Mod: ,,, | Performed by: CLINICAL MEDICAL LABORATORY

## 2022-03-17 PROCEDURE — 3078F PR MOST RECENT DIASTOLIC BLOOD PRESSURE < 80 MM HG: ICD-10-PCS | Mod: ,,, | Performed by: NURSE PRACTITIONER

## 2022-03-17 PROCEDURE — 1101F PT FALLS ASSESS-DOCD LE1/YR: CPT | Mod: ,,, | Performed by: NURSE PRACTITIONER

## 2022-03-17 PROCEDURE — 90686 FLU VACCINE (QUAD) GREATER THAN OR EQUAL TO 3YO PRESERVATIVE FREE IM: ICD-10-PCS | Mod: ,,, | Performed by: NURSE PRACTITIONER

## 2022-03-17 PROCEDURE — G0009 ADMIN PNEUMOCOCCAL VACCINE: HCPCS | Mod: ,,, | Performed by: NURSE PRACTITIONER

## 2022-03-17 PROCEDURE — 80053 COMPREHENSIVE METABOLIC PANEL: ICD-10-PCS | Mod: ,,, | Performed by: CLINICAL MEDICAL LABORATORY

## 2022-03-17 PROCEDURE — 1159F PR MEDICATION LIST DOCUMENTED IN MEDICAL RECORD: ICD-10-PCS | Mod: ,,, | Performed by: NURSE PRACTITIONER

## 2022-03-17 PROCEDURE — 3074F PR MOST RECENT SYSTOLIC BLOOD PRESSURE < 130 MM HG: ICD-10-PCS | Mod: ,,, | Performed by: NURSE PRACTITIONER

## 2022-03-17 PROCEDURE — 1101F PR PT FALLS ASSESS DOC 0-1 FALLS W/OUT INJ PAST YR: ICD-10-PCS | Mod: ,,, | Performed by: NURSE PRACTITIONER

## 2022-03-17 PROCEDURE — 3078F DIAST BP <80 MM HG: CPT | Mod: ,,, | Performed by: NURSE PRACTITIONER

## 2022-03-17 PROCEDURE — 96372 THER/PROPH/DIAG INJ SC/IM: CPT | Mod: ,,, | Performed by: NURSE PRACTITIONER

## 2022-03-17 PROCEDURE — 3288F FALL RISK ASSESSMENT DOCD: CPT | Mod: ,,, | Performed by: NURSE PRACTITIONER

## 2022-03-17 PROCEDURE — 85025 CBC WITH DIFFERENTIAL: ICD-10-PCS | Mod: ,,, | Performed by: CLINICAL MEDICAL LABORATORY

## 2022-03-17 PROCEDURE — 90732 PPSV23 VACC 2 YRS+ SUBQ/IM: CPT | Mod: ,,, | Performed by: NURSE PRACTITIONER

## 2022-03-17 PROCEDURE — G0009 FLU VACCINE (QUAD) GREATER THAN OR EQUAL TO 3YO PRESERVATIVE FREE IM: ICD-10-PCS | Mod: ,,, | Performed by: NURSE PRACTITIONER

## 2022-03-17 PROCEDURE — 3008F BODY MASS INDEX DOCD: CPT | Mod: ,,, | Performed by: NURSE PRACTITIONER

## 2022-03-17 PROCEDURE — 85025 COMPLETE CBC W/AUTO DIFF WBC: CPT | Mod: ,,, | Performed by: CLINICAL MEDICAL LABORATORY

## 2022-03-17 PROCEDURE — 90732 PR PNEUMOCOCCAL VACCINE,23-VALENT,ADULT: ICD-10-PCS | Mod: ,,, | Performed by: NURSE PRACTITIONER

## 2022-03-17 PROCEDURE — 1159F MED LIST DOCD IN RCRD: CPT | Mod: ,,, | Performed by: NURSE PRACTITIONER

## 2022-03-17 RX ORDER — OMEPRAZOLE 20 MG/1
20 CAPSULE, DELAYED RELEASE ORAL DAILY
Qty: 90 CAPSULE | Refills: 1 | Status: SHIPPED | OUTPATIENT
Start: 2022-03-17 | End: 2022-11-16 | Stop reason: ALTCHOICE

## 2022-03-17 RX ORDER — LISINOPRIL AND HYDROCHLOROTHIAZIDE 20; 25 MG/1; MG/1
1 TABLET ORAL DAILY
Qty: 90 TABLET | Refills: 1 | Status: SHIPPED | OUTPATIENT
Start: 2022-03-17 | End: 2022-11-16 | Stop reason: SDUPTHER

## 2022-03-17 RX ORDER — MECLIZINE HYDROCHLORIDE 25 MG/1
25 TABLET ORAL 3 TIMES DAILY PRN
Qty: 90 TABLET | Refills: 0 | Status: SHIPPED | OUTPATIENT
Start: 2022-03-17 | End: 2022-07-26

## 2022-03-17 RX ORDER — POTASSIUM CHLORIDE 20 MEQ/1
20 TABLET, EXTENDED RELEASE ORAL DAILY
Qty: 90 TABLET | Refills: 1 | Status: SHIPPED | OUTPATIENT
Start: 2022-03-17 | End: 2022-08-08

## 2022-03-17 RX ORDER — CLOPIDOGREL BISULFATE 75 MG/1
75 TABLET ORAL DAILY
Qty: 90 TABLET | Refills: 1 | Status: SHIPPED | OUTPATIENT
Start: 2022-03-17 | End: 2022-11-16 | Stop reason: SDUPTHER

## 2022-03-17 RX ORDER — OMEPRAZOLE 20 MG/1
1 CAPSULE, DELAYED RELEASE ORAL DAILY
COMMUNITY
Start: 2021-09-26 | End: 2022-03-17 | Stop reason: SDUPTHER

## 2022-03-17 RX ORDER — CETIRIZINE HYDROCHLORIDE 10 MG/1
10 TABLET ORAL DAILY
Qty: 90 TABLET | Refills: 1 | Status: SHIPPED | OUTPATIENT
Start: 2022-03-17 | End: 2022-11-16 | Stop reason: SDUPTHER

## 2022-03-17 RX ORDER — CITALOPRAM 40 MG/1
40 TABLET, FILM COATED ORAL DAILY
Qty: 90 TABLET | Refills: 1 | Status: SHIPPED | OUTPATIENT
Start: 2022-03-17 | End: 2022-08-08

## 2022-03-17 RX ORDER — ROSUVASTATIN CALCIUM 40 MG/1
40 TABLET, COATED ORAL NIGHTLY
Qty: 90 TABLET | Refills: 1 | Status: SHIPPED | OUTPATIENT
Start: 2022-03-17 | End: 2022-11-16 | Stop reason: SDUPTHER

## 2022-03-17 RX ORDER — FUROSEMIDE 40 MG/1
40 TABLET ORAL DAILY
Qty: 90 TABLET | Refills: 1 | Status: SHIPPED | OUTPATIENT
Start: 2022-03-17 | End: 2022-08-08

## 2022-03-17 RX ORDER — CARVEDILOL 25 MG/1
25 TABLET ORAL 2 TIMES DAILY WITH MEALS
Qty: 180 TABLET | Refills: 1 | Status: SHIPPED | OUTPATIENT
Start: 2022-03-17 | End: 2022-08-08

## 2022-03-17 RX ORDER — METHYLPREDNISOLONE ACETATE 80 MG/ML
40 INJECTION, SUSPENSION INTRA-ARTICULAR; INTRALESIONAL; INTRAMUSCULAR; SOFT TISSUE
Status: COMPLETED | OUTPATIENT
Start: 2022-03-17 | End: 2022-03-17

## 2022-03-17 RX ORDER — MONTELUKAST SODIUM 10 MG/1
10 TABLET ORAL NIGHTLY
Qty: 90 TABLET | Refills: 1 | Status: SHIPPED | OUTPATIENT
Start: 2022-03-17 | End: 2022-03-28

## 2022-03-17 RX ADMIN — METHYLPREDNISOLONE ACETATE 40 MG: 80 INJECTION, SUSPENSION INTRA-ARTICULAR; INTRALESIONAL; INTRAMUSCULAR; SOFT TISSUE at 02:03

## 2022-03-17 NOTE — PROGRESS NOTES
"  NEW CLINIC NOTE    Ade Grant is a 67 y.o. White female     Chief Complaint   Patient presents with    Medication Refill     Pt in clinic for refills. Pt wants 90 day supply. Pt wants flu and pneumonia shot     Hip Pain     Pt also complains of left hip pain that radiates down the back of her leg to her toes         SUBJECTIVE  Pt presents to clinic today for refills. Denies being out of meds but she is getting close. Requesting a 90 day supply from [facility].     Also complaining of left hip pain and left lower back pain. Reports this pain has been present for years. Reports the pain is worse some days and then better some days. Currently under pain treatment for chronic pain of low back. Taking norco and neurontin as well as robaxin from him. Next appt with Dr. Hernandez will be in two months, was seen one month ago. Reports taking the meds are "the only way she can get through the day" but states she feels like the aren't working as well as they used to      Current Outpatient Medications on File Prior to Visit   Medication Sig Dispense Refill    aspirin 325 MG tablet Take 325 mg by mouth once daily.      cyclobenzaprine (FLEXERIL) 10 MG tablet Take 1 tablet (10 mg total) by mouth every 8 (eight) hours. 90 tablet 2    gabapentin (NEURONTIN) 600 MG tablet Take 1 tablet (600 mg total) by mouth every 8 (eight) hours. 90 tablet 2    [] HYDROcodone-acetaminophen (NORCO)  mg per tablet Take 1 tablet by mouth every 8 (eight) hours. 90 tablet 0    [DISCONTINUED] albuterol sulfate (VENTOLIN HFA INHL) Inhale into the lungs.       No current facility-administered medications on file prior to visit.      Review of patient's allergies indicates:   Allergen Reactions    Fenofibrate nanocrystallized Rash        Past Medical History:   Diagnosis Date    Anxiety     Cervical radiculopathy     Chronic pain syndrome     COPD (chronic obstructive pulmonary disease)     Depression     GERD " (gastroesophageal reflux disease)     Hyperlipidemia     Hypertension     Lumbosacral radiculopathy     Obstructive sleep apnea     Other long term (current) drug therapy     Presence of right artificial knee joint     Vitamin D deficiency       Past Surgical History:   Procedure Laterality Date    ARTHROSCOPY OF HIP Left     CAUDAL EPIDURAL STEROID INJECTION N/A 06/04/2018 2/5/2018    Dr Hawkins    HYSTERECTOMY      KNEE ARTHROSCOPY Right     LUMBAR FUSION       Family History   Problem Relation Age of Onset    Cancer Mother     Heart disease Mother     Hypertension Mother     Heart disease Father     Thyroid cancer Brother     Bone cancer Brother     Colon cancer Brother     Thyroid cancer Other     Cancer Other      Social History     Socioeconomic History    Marital status:    Tobacco Use    Smoking status: Current Every Day Smoker    Smokeless tobacco: Never Used   Substance and Sexual Activity    Alcohol use: Not Currently        Lab Results   Component Value Date    WBC 8.89 03/17/2022    HGB 15.7 03/17/2022    HCT 47.6 (H) 03/17/2022    MCV 90.2 03/17/2022     03/17/2022        CMP  Sodium   Date Value Ref Range Status   03/17/2022 137 136 - 145 mmol/L Final     Potassium   Date Value Ref Range Status   03/17/2022 3.2 (L) 3.5 - 5.1 mmol/L Final     Chloride   Date Value Ref Range Status   03/17/2022 102 98 - 107 mmol/L Final     CO2   Date Value Ref Range Status   03/17/2022 29 21 - 32 mmol/L Final     Glucose   Date Value Ref Range Status   03/17/2022 114 (H) 74 - 106 mg/dL Final     BUN   Date Value Ref Range Status   03/17/2022 15 7 - 18 mg/dL Final     Creatinine   Date Value Ref Range Status   03/17/2022 0.82 0.55 - 1.02 mg/dL Final     Calcium   Date Value Ref Range Status   03/17/2022 8.9 8.5 - 10.1 mg/dL Final     Total Protein   Date Value Ref Range Status   03/17/2022 7.1 6.4 - 8.2 g/dL Final     Albumin   Date Value Ref Range Status   03/17/2022 3.5 3.5 - 5.0  g/dL Final     Bilirubin, Total   Date Value Ref Range Status   03/17/2022 0.5 0.0 - 1.2 mg/dL Final     Alk Phos   Date Value Ref Range Status   03/17/2022 76 55 - 142 U/L Final     AST   Date Value Ref Range Status   03/17/2022 13 (L) 15 - 37 U/L Final     ALT   Date Value Ref Range Status   03/17/2022 23 13 - 56 U/L Final     Anion Gap   Date Value Ref Range Status   03/17/2022 9 7 - 16 mmol/L Final     eGFR   Date Value Ref Range Status   03/17/2022 74 >=60 mL/min/1.73m² Final     No results found for: LABA1C, HGBA1C      OBJECTIVE  /71 (BP Location: Left arm, Patient Position: Sitting, BP Method: Medium (Automatic))   Pulse 73   Temp 98.2 °F (36.8 °C) (Oral)   Resp 20   Wt 76.6 kg (168 lb 12.8 oz)   SpO2 (!) 94%   BMI 29.90 kg/m²      Physical Exam  Vitals reviewed.   Constitutional:       Appearance: Normal appearance. She is normal weight.   HENT:      Mouth/Throat:      Mouth: Mucous membranes are moist.   Cardiovascular:      Rate and Rhythm: Normal rate and regular rhythm.      Pulses: Normal pulses.      Heart sounds: Normal heart sounds.   Pulmonary:      Effort: Pulmonary effort is normal.      Breath sounds: Normal breath sounds.   Musculoskeletal:         General: Normal range of motion.      Cervical back: Normal range of motion.   Skin:     General: Skin is warm.      Capillary Refill: Capillary refill takes less than 2 seconds.   Neurological:      Mental Status: She is alert. Mental status is at baseline.   Psychiatric:         Behavior: Behavior normal.            ASSESSMENT & PLAN  1. Well-controlled hypertension    2. Hyperlipidemia, unspecified hyperlipidemia type    3. Idiopathic hypertension    4. Allergic rhinitis, unspecified seasonality, unspecified trigger    5. Depression, unspecified depression type    6. Seasonal allergies    7. Vertigo    8. Encounter for administration of vaccine    9. Smoking addiction    10. Chronic pain syndrome         Problem List Items Addressed  This Visit    None     Visit Diagnoses     Well-controlled hypertension    -  Primary    Relevant Orders    Comprehensive Metabolic Panel (Completed)    CBC Auto Differential (Completed)    Hyperlipidemia, unspecified hyperlipidemia type        Relevant Orders    Lipid Panel (Completed)    Idiopathic hypertension        Relevant Medications    carvediloL (COREG) 25 MG tablet    clopidogreL (PLAVIX) 75 mg tablet    furosemide (LASIX) 40 MG tablet    lisinopriL-hydrochlorothiazide (PRINZIDE,ZESTORETIC) 20-25 mg Tab    potassium chloride SA (K-DUR,KLOR-CON) 20 MEQ tablet    rosuvastatin (CRESTOR) 40 MG Tab    Allergic rhinitis, unspecified seasonality, unspecified trigger        Relevant Medications    cetirizine (ZYRTEC) 10 MG tablet    Depression, unspecified depression type        Relevant Medications    citalopram (CELEXA) 40 MG tablet    Seasonal allergies        Vertigo        Relevant Medications    meclizine (ANTIVERT) 25 mg tablet    Encounter for administration of vaccine        Relevant Medications    pneumococcal vaccine (PNU-IMMUNE 23) injection 0.5 mL (Completed)    Other Relevant Orders    Influenza - Quadrivalent *Preferred* (6 months+) (PF) (Completed)    Smoking addiction        Chronic pain syndrome              No follow-ups on file.   Pt to get back in with cardiologist  inj today  Explained to pt that continued/breakthrhough pain should be discussed with Dr. Hernandez. No adjustments or additions to his meds will be made. Advised pt that if pain is unbearable, attempt to make an earlier appt with him rather than the routine 2month f/u she has.   Labs today  Refills today.  Also stressed need to return to cardiology. Explained to pt that these routine appts are needed for her overall health. It is not optimal to quit seeing a specialist after

## 2022-03-18 ENCOUNTER — TELEPHONE (OUTPATIENT)
Dept: PRIMARY CARE CLINIC | Facility: CLINIC | Age: 67
End: 2022-03-18
Payer: MEDICARE

## 2022-05-18 NOTE — PROGRESS NOTES
Subjective:      Patient ID: Ade Grant is a 67 y.o. female.    Chief Complaint: Low-back Pain, Shoulder Pain (Bilateral shoulders), and Neck Pain      Pain  This is a chronic problem. The current episode started more than 1 year ago. The problem occurs daily. The problem has been unchanged. Associated symptoms include arthralgias, myalgias and neck pain. Pertinent negatives include no change in bowel habit, chest pain, chills, coughing, diaphoresis, fever, rash, sore throat, vertigo or vomiting.     Review of Systems   Constitutional: Negative for activity change, appetite change, chills, diaphoresis, fever and unexpected weight change.   HENT: Negative for drooling, ear discharge, ear pain, facial swelling, mouth sores, nosebleeds, sore throat, trouble swallowing, voice change and goiter.    Eyes: Negative for photophobia, pain, discharge, redness and visual disturbance.   Respiratory: Negative for apnea, cough, choking, chest tightness, shortness of breath, wheezing and stridor.    Cardiovascular: Negative for chest pain, palpitations and leg swelling.   Gastrointestinal: Negative for abdominal distention, change in bowel habit, diarrhea, rectal pain, vomiting, fecal incontinence and change in bowel habit.   Endocrine: Negative for cold intolerance, heat intolerance, polydipsia, polyphagia and polyuria.   Genitourinary: Negative for bladder incontinence, dysuria, flank pain, frequency and hot flashes.   Musculoskeletal: Positive for arthralgias, back pain, leg pain, myalgias and neck pain.   Integumentary:  Negative for color change, pallor and rash.   Allergic/Immunologic: Negative for immunocompromised state.   Neurological: Negative for dizziness, vertigo, seizures, syncope, facial asymmetry, speech difficulty, light-headedness, disturbances in coordination, memory loss and coordination difficulties.   Hematological: Negative for adenopathy. Does not bruise/bleed easily.   Psychiatric/Behavioral:  "Negative for agitation, behavioral problems, confusion, decreased concentration, dysphoric mood, hallucinations, self-injury and suicidal ideas. The patient is not nervous/anxious and is not hyperactive.             Objective:  Vitals:    05/19/22 1320   BP: 118/75   Pulse: 66   Weight: 75.8 kg (167 lb)   Height: 5' 3" (1.6 m)   PainSc:   7         Physical Exam  Vitals and nursing note reviewed. Exam conducted with a chaperone present.   Constitutional:       General: She is awake. She is not in acute distress.     Appearance: Normal appearance. She is not ill-appearing or diaphoretic.   HENT:      Head: Normocephalic and atraumatic.      Nose: Nose normal.      Mouth/Throat:      Mouth: Mucous membranes are moist.      Pharynx: Oropharynx is clear.   Eyes:      Conjunctiva/sclera: Conjunctivae normal.      Pupils: Pupils are equal, round, and reactive to light.   Cardiovascular:      Rate and Rhythm: Normal rate.   Pulmonary:      Effort: Pulmonary effort is normal. No respiratory distress.   Abdominal:      Palpations: Abdomen is soft.      Tenderness: There is no guarding.   Musculoskeletal:         General: Normal range of motion.      Cervical back: Normal range of motion and neck supple. Tenderness present. No rigidity.      Thoracic back: Tenderness present.      Lumbar back: Tenderness present.   Skin:     General: Skin is warm and dry.      Coloration: Skin is not jaundiced or pale.   Neurological:      General: No focal deficit present.      Mental Status: She is alert and oriented to person, place, and time. Mental status is at baseline.      Cranial Nerves: Cranial nerves are intact. No cranial nerve deficit (II-XII).   Psychiatric:         Mood and Affect: Mood normal.         Behavior: Behavior normal. Behavior is cooperative.         Thought Content: Thought content normal.           DXA Bone Density Appendicular Skeleton  Narrative: BONE DENSITOMETRY    INDICATION: Post-menopausal     COMPARISON: " None.    TECHNIQUE: Bone densitometry was performed with evaluation of the lumbar  spine and left hip.    FINDINGS:     Right forearm total:  BMD: 0.494 g/cm^2  T-score: -1.6  Z-score: -0.1    Right hip neck:    BMD: 0.731 g/cm^2  T-score: -1.1  Z-score: 0.4  Impression: IMPRESSION: Findings consistent with osteopenia.     Place of service: [facility].    This report has been electronically signed by Ambrocio Perdomo MD       Office Visit on 03/17/2022   Component Date Value Ref Range Status    Triglycerides 03/17/2022 213 (A) 35 - 150 mg/dL Final    Cholesterol 03/17/2022 97  0 - 200 mg/dL Final    HDL Cholesterol 03/17/2022 31 (A) 40 - 60 mg/dL Final    Cholesterol/HDL Ratio (Risk Factor) 03/17/2022 3.1   Final    Non-HDL 03/17/2022 66  mg/dL Final    LDL Calculated 03/17/2022 23  mg/dL Final    LDL/HDL 03/17/2022 0.7   Final    VLDL 03/17/2022 43  mg/dL Final    Sodium 03/17/2022 137  136 - 145 mmol/L Final    Potassium 03/17/2022 3.2 (A) 3.5 - 5.1 mmol/L Final    Chloride 03/17/2022 102  98 - 107 mmol/L Final    CO2 03/17/2022 29  21 - 32 mmol/L Final    Anion Gap 03/17/2022 9  7 - 16 mmol/L Final    Glucose 03/17/2022 114 (A) 74 - 106 mg/dL Final    BUN 03/17/2022 15  7 - 18 mg/dL Final    Creatinine 03/17/2022 0.82  0.55 - 1.02 mg/dL Final    BUN/Creatinine Ratio 03/17/2022 18  6 - 20 Final    Calcium 03/17/2022 8.9  8.5 - 10.1 mg/dL Final    Total Protein 03/17/2022 7.1  6.4 - 8.2 g/dL Final    Albumin 03/17/2022 3.5  3.5 - 5.0 g/dL Final    Globulin 03/17/2022 3.6  2.0 - 4.0 g/dL Final    A/G Ratio 03/17/2022 1.0   Final    Bilirubin, Total 03/17/2022 0.5  0.0 - 1.2 mg/dL Final    Alk Phos 03/17/2022 76  55 - 142 U/L Final    ALT 03/17/2022 23  13 - 56 U/L Final    AST 03/17/2022 13 (A) 15 - 37 U/L Final    eGFR 03/17/2022 74  >=60 mL/min/1.73m² Final    WBC 03/17/2022 8.89  4.50 - 11.00 K/uL Final    RBC 03/17/2022 5.28  4.20 - 5.40 M/uL Final    Hemoglobin 03/17/2022 15.7   12.0 - 16.0 g/dL Final    Hematocrit 03/17/2022 47.6 (A) 38.0 - 47.0 % Final    MCV 03/17/2022 90.2  80.0 - 96.0 fL Final    MCH 03/17/2022 29.7  27.0 - 31.0 pg Final    MCHC 03/17/2022 33.0  32.0 - 36.0 g/dL Final    RDW 03/17/2022 14.2  11.5 - 14.5 % Final    Platelet Count 03/17/2022 216  150 - 400 K/uL Final    MPV 03/17/2022 11.2  9.4 - 12.4 fL Final    Neutrophils % 03/17/2022 49.1 (A) 53.0 - 65.0 % Final    Lymphocytes % 03/17/2022 42.9 (A) 27.0 - 41.0 % Final    Monocytes % 03/17/2022 6.1 (A) 2.0 - 6.0 % Final    Eosinophils % 03/17/2022 0.8 (A) 1.0 - 4.0 % Final    Basophils % 03/17/2022 0.9  0.0 - 1.0 % Final    Immature Granulocytes % 03/17/2022 0.2  0.0 - 0.4 % Final    nRBC, Auto 03/17/2022 0.0  <=0.0 % Final    Neutrophils, Abs 03/17/2022 4.37  1.80 - 7.70 K/uL Final    Lymphocytes, Absolute 03/17/2022 3.81  1.00 - 4.80 K/uL Final    Monocytes, Absolute 03/17/2022 0.54  0.00 - 0.80 K/uL Final    Eosinophils, Absolute 03/17/2022 0.07  0.00 - 0.50 K/uL Final    Basophils, Absolute 03/17/2022 0.08  0.00 - 0.20 K/uL Final    Immature Granulocytes, Absolute 03/17/2022 0.02  0.00 - 0.04 K/uL Final    nRBC, Absolute 03/17/2022 0.00  <=0.00 x10e3/uL Final    Diff Type 03/17/2022 Auto   Final   Office Visit on 02/21/2022   Component Date Value Ref Range Status    POC Amphetamines 02/21/2022 Negative  Negative, Inconclusive Final    POC Barbiturates 02/21/2022 Negative  Negative, Inconclusive Final    POC Benzodiazepines 02/21/2022 Negative  Negative, Inconclusive Final    POC Cocaine 02/21/2022 Negative  Negative, Inconclusive Final    POC THC 02/21/2022 Negative  Negative, Inconclusive Final    POC Methadone 02/21/2022 Negative  Negative, Inconclusive Final    POC Methamphetamine 02/21/2022 Negative  Negative, Inconclusive Final    POC Opiates 02/21/2022 Presumptive Positive  Negative, Inconclusive Final    POC Oxycodone 02/21/2022 Negative  Negative, Inconclusive Final    POC  Phencyclidine 02/21/2022 Negative  Negative, Inconclusive Final    POC Methylenedioxymethamphetamine * 02/21/2022 Negative  Negative, Inconclusive Final    POC Tricyclic Antidepressants 02/21/2022 Negative  Negative, Inconclusive Final    POC Buprenorphine 02/21/2022 Negative   Final     Acceptable 02/21/2022 Yes   Final    POC Temperature (Urine) 02/21/2022 96   Final           Assessment:      1. Lumbosacral radiculopathy    2. Back muscle spasm    3. Cervical radiculopathy    4. Encounter for long-term (current) use of other medications          Orders Placed This Encounter   Procedures    POCT Urine Drug Screen Presump     Interpretive Information:     Negative:  No drug detected at the cut off level.   Positive:  This result represents presumptive positive for the   tested drug, other substances may yield a positive response other   than the analyte of interest. This result should be utilized for   diagnostic purpose only. Confirmation testing will be performed upon physician request only.            A's of Opioid Risk Assessment  Activity:Patient can perform ADL.   Analgesia:Patients pain is partially controlled by current medication. Patient has tried OTC medications such as Tylenol and Ibuprofen with out relief.   Adverse Effects: Patient denies constipation or sedation.  Aberrant Behavior:  reviewed with no aberrant drug seeking/taking behavior.  Overdose reversal drug naloxone discussed    Drug screen reviewed      Requested Prescriptions     Signed Prescriptions Disp Refills    cyclobenzaprine (FLEXERIL) 10 MG tablet 270 tablet 0     Sig: Take 1 tablet (10 mg total) by mouth every 8 (eight) hours.    gabapentin (NEURONTIN) 600 MG tablet 90 tablet 2     Sig: Take 1 tablet (600 mg total) by mouth every 8 (eight) hours.    HYDROcodone-acetaminophen (NORCO)  mg per tablet 90 tablet 0     Sig: Take 1 tablet by mouth every 8 (eight) hours.    HYDROcodone-acetaminophen (NORCO)   mg per tablet 90 tablet 0     Sig: Take 1 tablet by mouth every 8 (eight) hours.    HYDROcodone-acetaminophen (NORCO)  mg per tablet 90 tablet 0     Sig: Take 1 tablet by mouth every 8 (eight) hours.         Plan:    History lumbar surgery x2    She states current medications helping control her discomfort she has no new complaints    Continue home exercise program as directed    Continue current medication    Follow-up 3 months    Dr. rAmas, August 2022    Bring original prescription medication bottles/container/box with labels to each visit

## 2022-05-19 ENCOUNTER — OFFICE VISIT (OUTPATIENT)
Dept: PAIN MEDICINE | Facility: CLINIC | Age: 67
End: 2022-05-19
Payer: MEDICARE

## 2022-05-19 VITALS
DIASTOLIC BLOOD PRESSURE: 75 MMHG | WEIGHT: 167 LBS | HEART RATE: 66 BPM | SYSTOLIC BLOOD PRESSURE: 118 MMHG | HEIGHT: 63 IN | BODY MASS INDEX: 29.59 KG/M2

## 2022-05-19 DIAGNOSIS — M62.830 BACK MUSCLE SPASM: ICD-10-CM

## 2022-05-19 DIAGNOSIS — M54.17 LUMBOSACRAL RADICULOPATHY: Primary | Chronic | ICD-10-CM

## 2022-05-19 DIAGNOSIS — Z79.899 ENCOUNTER FOR LONG-TERM (CURRENT) USE OF OTHER MEDICATIONS: ICD-10-CM

## 2022-05-19 DIAGNOSIS — M54.12 CERVICAL RADICULOPATHY: Chronic | ICD-10-CM

## 2022-05-19 PROCEDURE — 99214 OFFICE O/P EST MOD 30 MIN: CPT | Mod: PBBFAC | Performed by: PHYSICIAN ASSISTANT

## 2022-05-19 PROCEDURE — 3008F BODY MASS INDEX DOCD: CPT | Mod: CPTII,,, | Performed by: PHYSICIAN ASSISTANT

## 2022-05-19 PROCEDURE — 3288F FALL RISK ASSESSMENT DOCD: CPT | Mod: CPTII,,, | Performed by: PHYSICIAN ASSISTANT

## 2022-05-19 PROCEDURE — 1101F PR PT FALLS ASSESS DOC 0-1 FALLS W/OUT INJ PAST YR: ICD-10-PCS | Mod: CPTII,,, | Performed by: PHYSICIAN ASSISTANT

## 2022-05-19 PROCEDURE — 3008F PR BODY MASS INDEX (BMI) DOCUMENTED: ICD-10-PCS | Mod: CPTII,,, | Performed by: PHYSICIAN ASSISTANT

## 2022-05-19 PROCEDURE — 1159F MED LIST DOCD IN RCRD: CPT | Mod: CPTII,,, | Performed by: PHYSICIAN ASSISTANT

## 2022-05-19 PROCEDURE — 3288F PR FALLS RISK ASSESSMENT DOCUMENTED: ICD-10-PCS | Mod: CPTII,,, | Performed by: PHYSICIAN ASSISTANT

## 2022-05-19 PROCEDURE — 4010F ACE/ARB THERAPY RXD/TAKEN: CPT | Mod: CPTII,,, | Performed by: PHYSICIAN ASSISTANT

## 2022-05-19 PROCEDURE — 3078F DIAST BP <80 MM HG: CPT | Mod: CPTII,,, | Performed by: PHYSICIAN ASSISTANT

## 2022-05-19 PROCEDURE — 99214 PR OFFICE/OUTPT VISIT, EST, LEVL IV, 30-39 MIN: ICD-10-PCS | Mod: S$PBB,,, | Performed by: PHYSICIAN ASSISTANT

## 2022-05-19 PROCEDURE — 80305 DRUG TEST PRSMV DIR OPT OBS: CPT | Mod: PBBFAC | Performed by: PHYSICIAN ASSISTANT

## 2022-05-19 PROCEDURE — 3074F SYST BP LT 130 MM HG: CPT | Mod: CPTII,,, | Performed by: PHYSICIAN ASSISTANT

## 2022-05-19 PROCEDURE — 3078F PR MOST RECENT DIASTOLIC BLOOD PRESSURE < 80 MM HG: ICD-10-PCS | Mod: CPTII,,, | Performed by: PHYSICIAN ASSISTANT

## 2022-05-19 PROCEDURE — 1101F PT FALLS ASSESS-DOCD LE1/YR: CPT | Mod: CPTII,,, | Performed by: PHYSICIAN ASSISTANT

## 2022-05-19 PROCEDURE — 4010F PR ACE/ARB THEARPY RXD/TAKEN: ICD-10-PCS | Mod: CPTII,,, | Performed by: PHYSICIAN ASSISTANT

## 2022-05-19 PROCEDURE — 3074F PR MOST RECENT SYSTOLIC BLOOD PRESSURE < 130 MM HG: ICD-10-PCS | Mod: CPTII,,, | Performed by: PHYSICIAN ASSISTANT

## 2022-05-19 PROCEDURE — 99214 OFFICE O/P EST MOD 30 MIN: CPT | Mod: S$PBB,,, | Performed by: PHYSICIAN ASSISTANT

## 2022-05-19 PROCEDURE — 1159F PR MEDICATION LIST DOCUMENTED IN MEDICAL RECORD: ICD-10-PCS | Mod: CPTII,,, | Performed by: PHYSICIAN ASSISTANT

## 2022-05-19 RX ORDER — HYDROCODONE BITARTRATE AND ACETAMINOPHEN 10; 325 MG/1; MG/1
1 TABLET ORAL EVERY 8 HOURS
Qty: 90 TABLET | Refills: 0 | Status: SHIPPED | OUTPATIENT
Start: 2022-05-27 | End: 2022-06-26

## 2022-05-19 RX ORDER — HYDROCODONE BITARTRATE AND ACETAMINOPHEN 10; 325 MG/1; MG/1
1 TABLET ORAL EVERY 8 HOURS
Qty: 90 TABLET | Refills: 0 | Status: SHIPPED | OUTPATIENT
Start: 2022-07-26 | End: 2022-08-25 | Stop reason: SDUPTHER

## 2022-05-19 RX ORDER — CYCLOBENZAPRINE HCL 10 MG
10 TABLET ORAL EVERY 8 HOURS
Qty: 270 TABLET | Refills: 0 | Status: SHIPPED | OUTPATIENT
Start: 2022-05-19 | End: 2022-10-19

## 2022-05-19 RX ORDER — GABAPENTIN 600 MG/1
600 TABLET ORAL EVERY 8 HOURS
Qty: 90 TABLET | Refills: 2 | Status: SHIPPED | OUTPATIENT
Start: 2022-05-19 | End: 2022-08-25 | Stop reason: SDUPTHER

## 2022-05-19 RX ORDER — HYDROCODONE BITARTRATE AND ACETAMINOPHEN 10; 325 MG/1; MG/1
1 TABLET ORAL EVERY 8 HOURS
Qty: 90 TABLET | Refills: 0 | Status: SHIPPED | OUTPATIENT
Start: 2022-06-26 | End: 2022-07-26

## 2022-08-25 ENCOUNTER — OFFICE VISIT (OUTPATIENT)
Dept: PAIN MEDICINE | Facility: CLINIC | Age: 67
End: 2022-08-25
Payer: MEDICARE

## 2022-08-25 VITALS
BODY MASS INDEX: 29.23 KG/M2 | SYSTOLIC BLOOD PRESSURE: 138 MMHG | WEIGHT: 165 LBS | HEART RATE: 72 BPM | DIASTOLIC BLOOD PRESSURE: 87 MMHG | HEIGHT: 63 IN

## 2022-08-25 DIAGNOSIS — M54.12 CERVICAL RADICULOPATHY: Chronic | ICD-10-CM

## 2022-08-25 DIAGNOSIS — M70.62 TROCHANTERIC BURSITIS, LEFT HIP: Primary | Chronic | ICD-10-CM

## 2022-08-25 DIAGNOSIS — M96.1 POSTLAMINECTOMY SYNDROME, LUMBAR REGION: ICD-10-CM

## 2022-08-25 DIAGNOSIS — Z79.899 ENCOUNTER FOR LONG-TERM (CURRENT) USE OF OTHER MEDICATIONS: ICD-10-CM

## 2022-08-25 DIAGNOSIS — M46.1 SACROILIITIS: Chronic | ICD-10-CM

## 2022-08-25 DIAGNOSIS — M54.17 LUMBOSACRAL RADICULOPATHY: Chronic | ICD-10-CM

## 2022-08-25 DIAGNOSIS — M54.9 DORSALGIA, UNSPECIFIED: ICD-10-CM

## 2022-08-25 DIAGNOSIS — M62.830 BACK MUSCLE SPASM: ICD-10-CM

## 2022-08-25 PROCEDURE — 3008F BODY MASS INDEX DOCD: CPT | Mod: CPTII,,, | Performed by: PHYSICIAN ASSISTANT

## 2022-08-25 PROCEDURE — 1159F PR MEDICATION LIST DOCUMENTED IN MEDICAL RECORD: ICD-10-PCS | Mod: CPTII,,, | Performed by: PHYSICIAN ASSISTANT

## 2022-08-25 PROCEDURE — 3075F PR MOST RECENT SYSTOLIC BLOOD PRESS GE 130-139MM HG: ICD-10-PCS | Mod: CPTII,,, | Performed by: PHYSICIAN ASSISTANT

## 2022-08-25 PROCEDURE — 1101F PT FALLS ASSESS-DOCD LE1/YR: CPT | Mod: CPTII,,, | Performed by: PHYSICIAN ASSISTANT

## 2022-08-25 PROCEDURE — 3075F SYST BP GE 130 - 139MM HG: CPT | Mod: CPTII,,, | Performed by: PHYSICIAN ASSISTANT

## 2022-08-25 PROCEDURE — 20610 LARGE JOINT ASPIRATION/INJECTION: L GREATER TROCHANTERIC BURSA: ICD-10-PCS | Mod: S$PBB,LT,, | Performed by: PHYSICIAN ASSISTANT

## 2022-08-25 PROCEDURE — 1159F MED LIST DOCD IN RCRD: CPT | Mod: CPTII,,, | Performed by: PHYSICIAN ASSISTANT

## 2022-08-25 PROCEDURE — 99214 OFFICE O/P EST MOD 30 MIN: CPT | Mod: PBBFAC,25 | Performed by: PHYSICIAN ASSISTANT

## 2022-08-25 PROCEDURE — 80305 DRUG TEST PRSMV DIR OPT OBS: CPT | Mod: PBBFAC | Performed by: PHYSICIAN ASSISTANT

## 2022-08-25 PROCEDURE — 3008F PR BODY MASS INDEX (BMI) DOCUMENTED: ICD-10-PCS | Mod: CPTII,,, | Performed by: PHYSICIAN ASSISTANT

## 2022-08-25 PROCEDURE — 4010F ACE/ARB THERAPY RXD/TAKEN: CPT | Mod: CPTII,,, | Performed by: PHYSICIAN ASSISTANT

## 2022-08-25 PROCEDURE — 1101F PR PT FALLS ASSESS DOC 0-1 FALLS W/OUT INJ PAST YR: ICD-10-PCS | Mod: CPTII,,, | Performed by: PHYSICIAN ASSISTANT

## 2022-08-25 PROCEDURE — 3288F PR FALLS RISK ASSESSMENT DOCUMENTED: ICD-10-PCS | Mod: CPTII,,, | Performed by: PHYSICIAN ASSISTANT

## 2022-08-25 PROCEDURE — 99214 PR OFFICE/OUTPT VISIT, EST, LEVL IV, 30-39 MIN: ICD-10-PCS | Mod: S$PBB,25,, | Performed by: PHYSICIAN ASSISTANT

## 2022-08-25 PROCEDURE — 99214 OFFICE O/P EST MOD 30 MIN: CPT | Mod: S$PBB,25,, | Performed by: PHYSICIAN ASSISTANT

## 2022-08-25 PROCEDURE — 3079F PR MOST RECENT DIASTOLIC BLOOD PRESSURE 80-89 MM HG: ICD-10-PCS | Mod: CPTII,,, | Performed by: PHYSICIAN ASSISTANT

## 2022-08-25 PROCEDURE — 4010F PR ACE/ARB THEARPY RXD/TAKEN: ICD-10-PCS | Mod: CPTII,,, | Performed by: PHYSICIAN ASSISTANT

## 2022-08-25 PROCEDURE — 3079F DIAST BP 80-89 MM HG: CPT | Mod: CPTII,,, | Performed by: PHYSICIAN ASSISTANT

## 2022-08-25 PROCEDURE — 20610 DRAIN/INJ JOINT/BURSA W/O US: CPT | Mod: PBBFAC,LT | Performed by: PHYSICIAN ASSISTANT

## 2022-08-25 PROCEDURE — 3288F FALL RISK ASSESSMENT DOCD: CPT | Mod: CPTII,,, | Performed by: PHYSICIAN ASSISTANT

## 2022-08-25 RX ORDER — HYDROCODONE BITARTRATE AND ACETAMINOPHEN 10; 325 MG/1; MG/1
1 TABLET ORAL EVERY 8 HOURS
Qty: 90 TABLET | Refills: 0 | Status: SHIPPED | OUTPATIENT
Start: 2022-08-25 | End: 2022-09-24

## 2022-08-25 RX ORDER — BUPIVACAINE HYDROCHLORIDE 2.5 MG/ML
2 INJECTION, SOLUTION EPIDURAL; INFILTRATION; INTRACAUDAL
Status: DISCONTINUED | OUTPATIENT
Start: 2022-08-25 | End: 2022-08-25 | Stop reason: HOSPADM

## 2022-08-25 RX ORDER — HYDROCODONE BITARTRATE AND ACETAMINOPHEN 10; 325 MG/1; MG/1
1 TABLET ORAL EVERY 8 HOURS
Qty: 90 TABLET | Refills: 0 | Status: SHIPPED | OUTPATIENT
Start: 2022-10-24 | End: 2022-11-23

## 2022-08-25 RX ORDER — GABAPENTIN 600 MG/1
600 TABLET ORAL EVERY 8 HOURS
Qty: 90 TABLET | Refills: 2 | Status: SHIPPED | OUTPATIENT
Start: 2022-08-25 | End: 2022-11-21 | Stop reason: SDUPTHER

## 2022-08-25 RX ORDER — TRIAMCINOLONE ACETONIDE 40 MG/ML
40 INJECTION, SUSPENSION INTRA-ARTICULAR; INTRAMUSCULAR
Status: DISCONTINUED | OUTPATIENT
Start: 2022-08-25 | End: 2022-08-25 | Stop reason: HOSPADM

## 2022-08-25 RX ORDER — CYCLOBENZAPRINE HCL 10 MG
10 TABLET ORAL EVERY 8 HOURS
Qty: 270 TABLET | Refills: 0 | Status: CANCELLED | OUTPATIENT
Start: 2022-08-25

## 2022-08-25 RX ORDER — HYDROCODONE BITARTRATE AND ACETAMINOPHEN 10; 325 MG/1; MG/1
1 TABLET ORAL EVERY 8 HOURS
Qty: 90 TABLET | Refills: 0 | Status: SHIPPED | OUTPATIENT
Start: 2022-09-24 | End: 2022-10-24

## 2022-08-25 RX ADMIN — BUPIVACAINE HYDROCHLORIDE 2 ML: 2.5 INJECTION, SOLUTION EPIDURAL; INFILTRATION; INTRACAUDAL; PERINEURAL at 01:08

## 2022-08-25 RX ADMIN — TRIAMCINOLONE ACETONIDE 40 MG: 40 INJECTION, SUSPENSION INTRA-ARTICULAR; INTRAMUSCULAR at 01:08

## 2022-08-25 NOTE — PROGRESS NOTES
Subjective:      Patient ID: Ade Grant is a 67 y.o. female.    Chief Complaint: Back Pain and Hip Pain      Pain  This is a chronic problem. The current episode started more than 1 year ago. The problem occurs daily. The problem has been waxing and waning. Associated symptoms include arthralgias, myalgias and neck pain. Pertinent negatives include no change in bowel habit, chest pain, chills, coughing, diaphoresis, fever, rash, sore throat, vertigo or vomiting.     Review of Systems   Constitutional: Negative for activity change, appetite change, chills, diaphoresis, fever and unexpected weight change.   HENT: Negative for drooling, ear discharge, ear pain, facial swelling, mouth sores, nosebleeds, sore throat, trouble swallowing, voice change and goiter.    Eyes: Negative for photophobia, pain, discharge, redness and visual disturbance.   Respiratory: Negative for apnea, cough, choking, chest tightness, shortness of breath, wheezing and stridor.    Cardiovascular: Negative for chest pain, palpitations and leg swelling.   Gastrointestinal: Negative for abdominal distention, change in bowel habit, diarrhea, rectal pain, vomiting, fecal incontinence and change in bowel habit.   Endocrine: Negative for cold intolerance, heat intolerance, polydipsia, polyphagia and polyuria.   Genitourinary: Negative for bladder incontinence, dysuria, flank pain, frequency and hot flashes.   Musculoskeletal: Positive for arthralgias, back pain, leg pain, myalgias and neck pain.   Integumentary:  Negative for color change, pallor and rash.   Allergic/Immunologic: Negative for immunocompromised state.   Neurological: Negative for dizziness, vertigo, seizures, syncope, facial asymmetry, speech difficulty, light-headedness, disturbances in coordination, memory loss and coordination difficulties.   Hematological: Negative for adenopathy. Does not bruise/bleed easily.   Psychiatric/Behavioral: Negative for agitation, behavioral  "problems, confusion, decreased concentration, dysphoric mood, hallucinations, self-injury and suicidal ideas. The patient is not nervous/anxious and is not hyperactive.            Past Surgical History:   Procedure Laterality Date    ARTHROSCOPY OF HIP Left     CAUDAL EPIDURAL STEROID INJECTION N/A 06/04/2018 2/5/2018    Dr Hawkins    HYSTERECTOMY      KNEE ARTHROSCOPY Right     LUMBAR FUSION            Objective:  Vitals:    08/25/22 1249   BP: 138/87   Pulse: 72   Weight: 74.8 kg (165 lb)   Height: 5' 3" (1.6 m)   PainSc:   6         Physical Exam  Vitals and nursing note reviewed. Exam conducted with a chaperone present.   Constitutional:       General: She is awake. She is not in acute distress.     Appearance: Normal appearance. She is not ill-appearing or diaphoretic.   HENT:      Head: Normocephalic and atraumatic.      Nose: Nose normal.      Mouth/Throat:      Mouth: Mucous membranes are moist.      Pharynx: Oropharynx is clear.   Eyes:      Conjunctiva/sclera: Conjunctivae normal.      Pupils: Pupils are equal, round, and reactive to light.   Cardiovascular:      Rate and Rhythm: Normal rate.   Pulmonary:      Effort: Pulmonary effort is normal. No respiratory distress.   Abdominal:      Palpations: Abdomen is soft.      Tenderness: There is no guarding.   Musculoskeletal:         General: Normal range of motion.      Cervical back: Normal range of motion and neck supple. Tenderness present. No rigidity.      Thoracic back: Tenderness present.      Lumbar back: Tenderness present.   Skin:     General: Skin is warm and dry.      Coloration: Skin is not jaundiced or pale.   Neurological:      General: No focal deficit present.      Mental Status: She is alert and oriented to person, place, and time. Mental status is at baseline.      Cranial Nerves: No cranial nerve deficit (II-XII).   Psychiatric:         Mood and Affect: Mood normal.         Behavior: Behavior normal. Behavior is cooperative.         " Thought Content: Thought content normal.           DXA Bone Density Appendicular Skeleton  Narrative: BONE DENSITOMETRY    INDICATION: Post-menopausal     COMPARISON: None.    TECHNIQUE: Bone densitometry was performed with evaluation of the lumbar  spine and left hip.    FINDINGS:     Right forearm total:  BMD: 0.494 g/cm^2  T-score: -1.6  Z-score: -0.1    Right hip neck:    BMD: 0.731 g/cm^2  T-score: -1.1  Z-score: 0.4  Impression: IMPRESSION: Findings consistent with osteopenia.     Place of service: [facility].    This report has been electronically signed by Ambrocio Perdomo MD       Office Visit on 05/19/2022   Component Date Value Ref Range Status    POC Amphetamines 05/19/2022 Negative  Negative, Inconclusive Final    POC Barbiturates 05/19/2022 Negative  Negative, Inconclusive Final    POC Benzodiazepines 05/19/2022 Negative  Negative, Inconclusive Final    POC Cocaine 05/19/2022 Negative  Negative, Inconclusive Final    POC THC 05/19/2022 Negative  Negative, Inconclusive Final    POC Methadone 05/19/2022 Negative  Negative, Inconclusive Final    POC Methamphetamine 05/19/2022 Negative  Negative, Inconclusive Final    POC Opiates 05/19/2022 Presumptive Positive (A) Negative, Inconclusive Final    POC Oxycodone 05/19/2022 Negative  Negative, Inconclusive Final    POC Phencyclidine 05/19/2022 Negative  Negative, Inconclusive Final    POC Methylenedioxymethamphetamine * 05/19/2022 Negative  Negative, Inconclusive Final    POC Tricyclic Antidepressants 05/19/2022 Negative  Negative, Inconclusive Final    POC Buprenorphine 05/19/2022 Negative   Final     Acceptable 05/19/2022 Yes   Final    POC Temperature (Urine) 05/19/2022 92   Final   Office Visit on 03/17/2022   Component Date Value Ref Range Status    Triglycerides 03/17/2022 213 (A) 35 - 150 mg/dL Final    Cholesterol 03/17/2022 97  0 - 200 mg/dL Final    HDL Cholesterol 03/17/2022 31 (A) 40 - 60 mg/dL Final     Cholesterol/HDL Ratio (Risk Factor) 03/17/2022 3.1   Final    Non-HDL 03/17/2022 66  mg/dL Final    LDL Calculated 03/17/2022 23  mg/dL Final    LDL/HDL 03/17/2022 0.7   Final    VLDL 03/17/2022 43  mg/dL Final    Sodium 03/17/2022 137  136 - 145 mmol/L Final    Potassium 03/17/2022 3.2 (A) 3.5 - 5.1 mmol/L Final    Chloride 03/17/2022 102  98 - 107 mmol/L Final    CO2 03/17/2022 29  21 - 32 mmol/L Final    Anion Gap 03/17/2022 9  7 - 16 mmol/L Final    Glucose 03/17/2022 114 (A) 74 - 106 mg/dL Final    BUN 03/17/2022 15  7 - 18 mg/dL Final    Creatinine 03/17/2022 0.82  0.55 - 1.02 mg/dL Final    BUN/Creatinine Ratio 03/17/2022 18  6 - 20 Final    Calcium 03/17/2022 8.9  8.5 - 10.1 mg/dL Final    Total Protein 03/17/2022 7.1  6.4 - 8.2 g/dL Final    Albumin 03/17/2022 3.5  3.5 - 5.0 g/dL Final    Globulin 03/17/2022 3.6  2.0 - 4.0 g/dL Final    A/G Ratio 03/17/2022 1.0   Final    Bilirubin, Total 03/17/2022 0.5  0.0 - 1.2 mg/dL Final    Alk Phos 03/17/2022 76  55 - 142 U/L Final    ALT 03/17/2022 23  13 - 56 U/L Final    AST 03/17/2022 13 (A) 15 - 37 U/L Final    eGFR 03/17/2022 74  >=60 mL/min/1.73m² Final    WBC 03/17/2022 8.89  4.50 - 11.00 K/uL Final    RBC 03/17/2022 5.28  4.20 - 5.40 M/uL Final    Hemoglobin 03/17/2022 15.7  12.0 - 16.0 g/dL Final    Hematocrit 03/17/2022 47.6 (A) 38.0 - 47.0 % Final    MCV 03/17/2022 90.2  80.0 - 96.0 fL Final    MCH 03/17/2022 29.7  27.0 - 31.0 pg Final    MCHC 03/17/2022 33.0  32.0 - 36.0 g/dL Final    RDW 03/17/2022 14.2  11.5 - 14.5 % Final    Platelet Count 03/17/2022 216  150 - 400 K/uL Final    MPV 03/17/2022 11.2  9.4 - 12.4 fL Final    Neutrophils % 03/17/2022 49.1 (A) 53.0 - 65.0 % Final    Lymphocytes % 03/17/2022 42.9 (A) 27.0 - 41.0 % Final    Monocytes % 03/17/2022 6.1 (A) 2.0 - 6.0 % Final    Eosinophils % 03/17/2022 0.8 (A) 1.0 - 4.0 % Final    Basophils % 03/17/2022 0.9  0.0 - 1.0 % Final    Immature Granulocytes %  03/17/2022 0.2  0.0 - 0.4 % Final    nRBC, Auto 03/17/2022 0.0  <=0.0 % Final    Neutrophils, Abs 03/17/2022 4.37  1.80 - 7.70 K/uL Final    Lymphocytes, Absolute 03/17/2022 3.81  1.00 - 4.80 K/uL Final    Monocytes, Absolute 03/17/2022 0.54  0.00 - 0.80 K/uL Final    Eosinophils, Absolute 03/17/2022 0.07  0.00 - 0.50 K/uL Final    Basophils, Absolute 03/17/2022 0.08  0.00 - 0.20 K/uL Final    Immature Granulocytes, Absolute 03/17/2022 0.02  0.00 - 0.04 K/uL Final    nRBC, Absolute 03/17/2022 0.00  <=0.00 x10e3/uL Final    Diff Type 03/17/2022 Auto   Final           Assessment:      1. Trochanteric bursitis, left hip    2. Lumbosacral radiculopathy    3. Back muscle spasm    4. Cervical radiculopathy    5. Encounter for long-term (current) use of other medications    6. Postlaminectomy syndrome, lumbar region L4-S1 Dr. Villeda    7. Dorsalgia, unspecified    8. Sacroiliitis          Orders Placed This Encounter   Procedures    Large Joint Aspiration/Injection: L greater trochanteric bursa     This order was created via procedure documentation    X-Ray Lumbar Complete Including Flex And Ext     Standing Status:   Future     Standing Expiration Date:   8/25/2023     Order Specific Question:   May the Radiologist modify the order per protocol to meet the clinical needs of the patient?     Answer:   Yes     Order Specific Question:   Does the patient have a neck collar or brace on?     Answer:   No    POCT Urine Drug Screen Presump     Interpretive Information:     Negative:  No drug detected at the cut off level.   Positive:  This result represents presumptive positive for the   tested drug, other substances may yield a positive response other   than the analyte of interest. This result should be utilized for   diagnostic purpose only. Confirmation testing will be performed upon physician request only.            A's of Opioid Risk Assessment  Activity:Patient can perform ADL.   Analgesia:Patients pain is  partially controlled by current medication. Patient has tried OTC medications such as Tylenol and Ibuprofen with out relief.   Adverse Effects: Patient denies constipation or sedation.  Aberrant Behavior:  reviewed with no aberrant drug seeking/taking behavior.  Overdose reversal drug naloxone discussed    Drug screen reviewed      Requested Prescriptions     Signed Prescriptions Disp Refills    gabapentin (NEURONTIN) 600 MG tablet 90 tablet 2     Sig: Take 1 tablet (600 mg total) by mouth every 8 (eight) hours.    HYDROcodone-acetaminophen (NORCO)  mg per tablet 90 tablet 0     Sig: Take 1 tablet by mouth every 8 (eight) hours.    HYDROcodone-acetaminophen (NORCO)  mg per tablet 90 tablet 0     Sig: Take 1 tablet by mouth every 8 (eight) hours.    HYDROcodone-acetaminophen (NORCO)  mg per tablet 90 tablet 0     Sig: Take 1 tablet by mouth every 8 (eight) hours.         Plan:    History lumbar surgery x2 L4 through S1 lumbar fusion Dr. Villeda    Complaint left lateral thigh pain pointing to her left trochanteric area she states is worse with sitting on her left arm lying on her left side for long periods she states has been ongoing over the last 3 months getting worse with time     Very tender left trochanteric area     She is requesting left trochanteric injection    Complaint low back pain pointing to her right sacroiliac area also complaining tenderness along the facet joint area above the fusion    She is considering lumbar procedure considering sacroiliac injection    Left trochanteric injection today    X-ray lumbar spine with flexion-extension views    Continue home exercise program as directed    Continue current medication    Follow-up 3 months    Dr. Armas, August 2022    Pill count     Physical therapy    Bring original prescription medication bottles/container/box with labels to each visit

## 2022-08-25 NOTE — PROCEDURES
Large Joint Aspiration/Injection: L greater trochanteric bursa    Date/Time: 8/25/2022 1:00 PM  Performed by: BOBY Levy  Authorized by: BOBY Levy     Consent Done?:  Yes (Written)  Indications:  Arthritis and pain  Site marked: the procedure site was marked    Timeout: prior to procedure the correct patient, procedure, and site was verified    Prep: patient was prepped and draped in usual sterile fashion      Details:  Needle Size:  22 G  Approach:  Lateral  Location:  Hip  Site:  L greater trochanteric bursa  Medications:  2 mL BUPivacaine (PF) 0.25% (2.5 mg/ml) 0.25 % (2.5 mg/mL); 40 mg triamcinolone acetonide 40 mg/mL  Aspirate amount (mL):  0  Patient tolerance:  Patient tolerated the procedure well with no immediate complications     Bupivacaine 0.25% 25 mg/ 10 ml , 2 cc     Tolerated procedure well    No complication noted    Band-Aid was applied

## 2022-11-16 ENCOUNTER — OFFICE VISIT (OUTPATIENT)
Dept: PRIMARY CARE CLINIC | Facility: CLINIC | Age: 67
End: 2022-11-16
Payer: MEDICARE

## 2022-11-16 VITALS
HEART RATE: 76 BPM | DIASTOLIC BLOOD PRESSURE: 86 MMHG | SYSTOLIC BLOOD PRESSURE: 142 MMHG | HEIGHT: 63 IN | BODY MASS INDEX: 31.1 KG/M2 | WEIGHT: 175.5 LBS | TEMPERATURE: 98 F | OXYGEN SATURATION: 98 % | RESPIRATION RATE: 18 BRPM

## 2022-11-16 DIAGNOSIS — Z23 ENCOUNTER FOR VACCINATION: ICD-10-CM

## 2022-11-16 DIAGNOSIS — Z91.148 NONCOMPLIANCE WITH MEDICATION TREATMENT DUE TO INTERMITTENT USE OF MEDICATION: ICD-10-CM

## 2022-11-16 DIAGNOSIS — J30.9 ALLERGIC RHINITIS, UNSPECIFIED SEASONALITY, UNSPECIFIED TRIGGER: ICD-10-CM

## 2022-11-16 DIAGNOSIS — Z11.59 SCREENING FOR VIRAL DISEASE: ICD-10-CM

## 2022-11-16 DIAGNOSIS — Z12.31 ENCOUNTER FOR SCREENING MAMMOGRAM FOR MALIGNANT NEOPLASM OF BREAST: ICD-10-CM

## 2022-11-16 DIAGNOSIS — J30.2 SEASONAL ALLERGIES: ICD-10-CM

## 2022-11-16 DIAGNOSIS — F32.A DEPRESSION, UNSPECIFIED DEPRESSION TYPE: ICD-10-CM

## 2022-11-16 DIAGNOSIS — E78.49 HYPERLIPIDEMIA DUE TO DIETARY FAT INTAKE: ICD-10-CM

## 2022-11-16 DIAGNOSIS — F17.200 SMOKER UNMOTIVATED TO QUIT: Chronic | ICD-10-CM

## 2022-11-16 DIAGNOSIS — I10 UNCONTROLLED HYPERTENSION: Primary | Chronic | ICD-10-CM

## 2022-11-16 LAB
ANION GAP SERPL CALCULATED.3IONS-SCNC: 11 MMOL/L (ref 7–16)
BUN SERPL-MCNC: 15 MG/DL (ref 7–18)
BUN/CREAT SERPL: 22 (ref 6–20)
CALCIUM SERPL-MCNC: 9.2 MG/DL (ref 8.5–10.1)
CHLORIDE SERPL-SCNC: 104 MMOL/L (ref 98–107)
CHOLEST SERPL-MCNC: 285 MG/DL (ref 0–200)
CHOLEST/HDLC SERPL: 9.2 {RATIO}
CO2 SERPL-SCNC: 27 MMOL/L (ref 21–32)
CREAT SERPL-MCNC: 0.68 MG/DL (ref 0.55–1.02)
EGFR (NO RACE VARIABLE) (RUSH/TITUS): 97 ML/MIN/1.73M²
GLUCOSE SERPL-MCNC: 100 MG/DL (ref 74–106)
HCV AB SER QL: NORMAL
HDLC SERPL-MCNC: 31 MG/DL (ref 40–60)
HIV 1+O+2 AB SERPL QL: NORMAL
LDLC SERPL CALC-MCNC: 196 MG/DL
LDLC/HDLC SERPL: 6.3 {RATIO}
NONHDLC SERPL-MCNC: 254 MG/DL
POTASSIUM SERPL-SCNC: 3.9 MMOL/L (ref 3.5–5.1)
SODIUM SERPL-SCNC: 138 MMOL/L (ref 136–145)
TRIGL SERPL-MCNC: 290 MG/DL (ref 35–150)
VLDLC SERPL-MCNC: 58 MG/DL

## 2022-11-16 PROCEDURE — 4010F PR ACE/ARB THEARPY RXD/TAKEN: ICD-10-PCS | Mod: ,,, | Performed by: NURSE PRACTITIONER

## 2022-11-16 PROCEDURE — 86803 HEPATITIS C AB TEST: CPT | Mod: ,,, | Performed by: CLINICAL MEDICAL LABORATORY

## 2022-11-16 PROCEDURE — 1101F PT FALLS ASSESS-DOCD LE1/YR: CPT | Mod: ,,, | Performed by: NURSE PRACTITIONER

## 2022-11-16 PROCEDURE — 87389 HIV 1 / 2 ANTIBODY: ICD-10-PCS | Mod: ,,, | Performed by: CLINICAL MEDICAL LABORATORY

## 2022-11-16 PROCEDURE — 3288F PR FALLS RISK ASSESSMENT DOCUMENTED: ICD-10-PCS | Mod: ,,, | Performed by: NURSE PRACTITIONER

## 2022-11-16 PROCEDURE — 3008F BODY MASS INDEX DOCD: CPT | Mod: ,,, | Performed by: NURSE PRACTITIONER

## 2022-11-16 PROCEDURE — 86803 HEPATITIS C ANTIBODY: ICD-10-PCS | Mod: ,,, | Performed by: CLINICAL MEDICAL LABORATORY

## 2022-11-16 PROCEDURE — 80048 BASIC METABOLIC PANEL: ICD-10-PCS | Mod: ,,, | Performed by: CLINICAL MEDICAL LABORATORY

## 2022-11-16 PROCEDURE — 3079F DIAST BP 80-89 MM HG: CPT | Mod: ,,, | Performed by: NURSE PRACTITIONER

## 2022-11-16 PROCEDURE — 3288F FALL RISK ASSESSMENT DOCD: CPT | Mod: ,,, | Performed by: NURSE PRACTITIONER

## 2022-11-16 PROCEDURE — 3077F SYST BP >= 140 MM HG: CPT | Mod: ,,, | Performed by: NURSE PRACTITIONER

## 2022-11-16 PROCEDURE — 1101F PR PT FALLS ASSESS DOC 0-1 FALLS W/OUT INJ PAST YR: ICD-10-PCS | Mod: ,,, | Performed by: NURSE PRACTITIONER

## 2022-11-16 PROCEDURE — 80061 LIPID PANEL: ICD-10-PCS | Mod: ,,, | Performed by: CLINICAL MEDICAL LABORATORY

## 2022-11-16 PROCEDURE — 87389 HIV-1 AG W/HIV-1&-2 AB AG IA: CPT | Mod: ,,, | Performed by: CLINICAL MEDICAL LABORATORY

## 2022-11-16 PROCEDURE — G0008 FLU VACCINE - QUADRIVALENT - ADJUVANTED: ICD-10-PCS | Mod: ,,, | Performed by: NURSE PRACTITIONER

## 2022-11-16 PROCEDURE — G0008 ADMIN INFLUENZA VIRUS VAC: HCPCS | Mod: ,,, | Performed by: NURSE PRACTITIONER

## 2022-11-16 PROCEDURE — 3079F PR MOST RECENT DIASTOLIC BLOOD PRESSURE 80-89 MM HG: ICD-10-PCS | Mod: ,,, | Performed by: NURSE PRACTITIONER

## 2022-11-16 PROCEDURE — 80048 BASIC METABOLIC PNL TOTAL CA: CPT | Mod: ,,, | Performed by: CLINICAL MEDICAL LABORATORY

## 2022-11-16 PROCEDURE — 3008F PR BODY MASS INDEX (BMI) DOCUMENTED: ICD-10-PCS | Mod: ,,, | Performed by: NURSE PRACTITIONER

## 2022-11-16 PROCEDURE — 99214 OFFICE O/P EST MOD 30 MIN: CPT | Mod: ,,, | Performed by: NURSE PRACTITIONER

## 2022-11-16 PROCEDURE — 80061 LIPID PANEL: CPT | Mod: ,,, | Performed by: CLINICAL MEDICAL LABORATORY

## 2022-11-16 PROCEDURE — 90694 VACC AIIV4 NO PRSRV 0.5ML IM: CPT | Mod: ,,, | Performed by: NURSE PRACTITIONER

## 2022-11-16 PROCEDURE — 4010F ACE/ARB THERAPY RXD/TAKEN: CPT | Mod: ,,, | Performed by: NURSE PRACTITIONER

## 2022-11-16 PROCEDURE — 90694 FLU VACCINE - QUADRIVALENT - ADJUVANTED: ICD-10-PCS | Mod: ,,, | Performed by: NURSE PRACTITIONER

## 2022-11-16 PROCEDURE — 99214 PR OFFICE/OUTPT VISIT, EST, LEVL IV, 30-39 MIN: ICD-10-PCS | Mod: ,,, | Performed by: NURSE PRACTITIONER

## 2022-11-16 PROCEDURE — 3077F PR MOST RECENT SYSTOLIC BLOOD PRESSURE >= 140 MM HG: ICD-10-PCS | Mod: ,,, | Performed by: NURSE PRACTITIONER

## 2022-11-16 RX ORDER — CITALOPRAM 40 MG/1
20 TABLET, FILM COATED ORAL DAILY
Qty: 90 TABLET | Refills: 1 | Status: SHIPPED | OUTPATIENT
Start: 2022-11-16 | End: 2023-04-12

## 2022-11-16 RX ORDER — CARVEDILOL 25 MG/1
25 TABLET ORAL 2 TIMES DAILY WITH MEALS
Qty: 180 TABLET | Refills: 1 | Status: SHIPPED | OUTPATIENT
Start: 2022-11-16 | End: 2023-04-12 | Stop reason: SDUPTHER

## 2022-11-16 RX ORDER — FUROSEMIDE 40 MG/1
40 TABLET ORAL DAILY
Qty: 90 TABLET | Refills: 1 | Status: SHIPPED | OUTPATIENT
Start: 2022-11-16 | End: 2023-04-12 | Stop reason: SDUPTHER

## 2022-11-16 RX ORDER — ROSUVASTATIN CALCIUM 40 MG/1
40 TABLET, COATED ORAL NIGHTLY
Qty: 90 TABLET | Refills: 1 | Status: SHIPPED | OUTPATIENT
Start: 2022-11-16 | End: 2023-04-12 | Stop reason: SDUPTHER

## 2022-11-16 RX ORDER — MONTELUKAST SODIUM 10 MG/1
10 TABLET ORAL NIGHTLY
Qty: 90 TABLET | Refills: 1 | Status: SHIPPED | OUTPATIENT
Start: 2022-11-16 | End: 2023-04-12 | Stop reason: SDUPTHER

## 2022-11-16 RX ORDER — LISINOPRIL AND HYDROCHLOROTHIAZIDE 20; 25 MG/1; MG/1
1 TABLET ORAL DAILY
Qty: 90 TABLET | Refills: 1 | Status: SHIPPED | OUTPATIENT
Start: 2022-11-16 | End: 2023-04-12 | Stop reason: SDUPTHER

## 2022-11-16 RX ORDER — CETIRIZINE HYDROCHLORIDE 10 MG/1
10 TABLET ORAL DAILY
Qty: 90 TABLET | Refills: 1 | Status: SHIPPED | OUTPATIENT
Start: 2022-11-16 | End: 2023-04-12

## 2022-11-16 RX ORDER — CLOPIDOGREL BISULFATE 75 MG/1
75 TABLET ORAL DAILY
Qty: 90 TABLET | Refills: 1 | Status: SHIPPED | OUTPATIENT
Start: 2022-11-16 | End: 2023-04-12 | Stop reason: SDUPTHER

## 2022-11-16 RX ORDER — POTASSIUM CHLORIDE 20 MEQ/1
20 TABLET, EXTENDED RELEASE ORAL DAILY
Qty: 90 TABLET | Refills: 1 | Status: SHIPPED | OUTPATIENT
Start: 2022-11-16 | End: 2023-04-12 | Stop reason: SDUPTHER

## 2022-11-16 NOTE — PROGRESS NOTES
"  NEW CLINIC NOTE    Ade Grant is a 67 y.o. White female     Chief Complaint   Patient presents with    Medication Refill    Hip Pain    Back Pain     Pt states that she is needing her lower back and left hip xrayed for Dr. Armas        SUBJECTIVE  Pt presents to clinic today for routine refills and labs. Reprots she also needs xrays per pain treatment. Reports she was told pain treatment placed order and she was under the impression she could have the images obtained here since "they take too long" to perform xrays in clinic. Has routine f/u with pain 11/22/22. Otherwise, denies health concerns at this time.      Current Outpatient Medications on File Prior to Visit   Medication Sig Dispense Refill    aspirin 325 MG tablet Take 325 mg by mouth once daily.      cyclobenzaprine (FLEXERIL) 10 MG tablet TAKE 1 TABLET EVERY 8 HOURS 270 tablet 0     No current facility-administered medications on file prior to visit.      Review of patient's allergies indicates:   Allergen Reactions    Fenofibrate nanocrystallized Rash        Past Medical History:   Diagnosis Date    Anxiety     Cervical radiculopathy     Chronic pain syndrome     COPD (chronic obstructive pulmonary disease)     Depression     GERD (gastroesophageal reflux disease)     Hyperlipidemia     Hypertension     Lumbosacral radiculopathy     Obstructive sleep apnea     Other long term (current) drug therapy     Presence of right artificial knee joint     Vitamin D deficiency       Past Surgical History:   Procedure Laterality Date    ARTHROSCOPY OF HIP Left     CAUDAL EPIDURAL STEROID INJECTION N/A 06/04/2018 2/5/2018    Dr Hawkins    HYSTERECTOMY      KNEE ARTHROSCOPY Right     LUMBAR FUSION       Family History   Problem Relation Age of Onset    Cancer Mother     Heart disease Mother     Hypertension Mother     Heart disease Father     Thyroid cancer Brother     Bone cancer Brother     Colon cancer Brother     Thyroid cancer Other     Cancer Other  " "    Social History     Socioeconomic History    Marital status:    Tobacco Use    Smoking status: Every Day    Smokeless tobacco: Never   Substance and Sexual Activity    Alcohol use: Not Currently        Lab Results   Component Value Date    WBC 8.89 03/17/2022    HGB 15.7 03/17/2022    HCT 47.6 (H) 03/17/2022    MCV 90.2 03/17/2022     03/17/2022        CMP  Sodium   Date Value Ref Range Status   11/16/2022 138 136 - 145 mmol/L Final     Potassium   Date Value Ref Range Status   11/16/2022 3.9 3.5 - 5.1 mmol/L Final     Chloride   Date Value Ref Range Status   11/16/2022 104 98 - 107 mmol/L Final     CO2   Date Value Ref Range Status   11/16/2022 27 21 - 32 mmol/L Final     Glucose   Date Value Ref Range Status   11/16/2022 100 74 - 106 mg/dL Final     BUN   Date Value Ref Range Status   11/16/2022 15 7 - 18 mg/dL Final     Creatinine   Date Value Ref Range Status   11/16/2022 0.68 0.55 - 1.02 mg/dL Final     Calcium   Date Value Ref Range Status   11/16/2022 9.2 8.5 - 10.1 mg/dL Final     Total Protein   Date Value Ref Range Status   03/17/2022 7.1 6.4 - 8.2 g/dL Final     Albumin   Date Value Ref Range Status   03/17/2022 3.5 3.5 - 5.0 g/dL Final     Bilirubin, Total   Date Value Ref Range Status   03/17/2022 0.5 0.0 - 1.2 mg/dL Final     Alk Phos   Date Value Ref Range Status   03/17/2022 76 55 - 142 U/L Final     AST   Date Value Ref Range Status   03/17/2022 13 (L) 15 - 37 U/L Final     ALT   Date Value Ref Range Status   03/17/2022 23 13 - 56 U/L Final     Anion Gap   Date Value Ref Range Status   11/16/2022 11 7 - 16 mmol/L Final     eGFR   Date Value Ref Range Status   03/17/2022 74 >=60 mL/min/1.73m² Final     No results found for: LABA1C, HGBA1C      OBJECTIVE  BP (!) 142/86 (BP Location: Right arm, Patient Position: Sitting)   Pulse 76   Temp 98 °F (36.7 °C) (Oral)   Resp 18   Ht 5' 3" (1.6 m)   Wt 79.6 kg (175 lb 8 oz)   SpO2 98%   BMI 31.09 kg/m²      Physical Exam  Vitals and " nursing note reviewed.   Constitutional:       Appearance: She is obese.      Comments: Unkept appearance.   HENT:      Nose: Nose normal.      Mouth/Throat:      Mouth: Mucous membranes are moist.   Cardiovascular:      Rate and Rhythm: Normal rate and regular rhythm.      Pulses: Normal pulses.      Heart sounds: Normal heart sounds.   Pulmonary:      Effort: Pulmonary effort is normal.      Breath sounds: Normal breath sounds.   Musculoskeletal:         General: Normal range of motion.      Cervical back: Normal range of motion.   Skin:     General: Skin is warm.      Capillary Refill: Capillary refill takes less than 2 seconds.   Neurological:      Mental Status: She is alert. Mental status is at baseline.   Psychiatric:         Behavior: Behavior normal.          ASSESSMENT & PLAN  1. Uncontrolled hypertension    2. Seasonal allergies    3. Depression, unspecified depression type    4. Allergic rhinitis, unspecified seasonality, unspecified trigger    5. Encounter for vaccination    6. Encounter for screening mammogram for malignant neoplasm of breast    7. Screening for viral disease    8. Smoker unmotivated to quit    9. Hyperlipidemia due to dietary fat intake    10. Noncompliance with medication treatment due to intermittent use of medication         Problem List Items Addressed This Visit          Psychiatric    Depression    Relevant Medications    citalopram (CELEXA) 40 MG tablet       ENT    Seasonal allergies    Relevant Medications    montelukast (SINGULAIR) 10 mg tablet    Allergic rhinitis    Relevant Medications    cetirizine (ZYRTEC) 10 MG tablet       Cardiac/Vascular    Uncontrolled hypertension - Primary (Chronic)    Overview     has not taken meds today  continue to smoke          Relevant Medications    lisinopriL-hydrochlorothiazide (PRINZIDE,ZESTORETIC) 20-25 mg Tab    rosuvastatin (CRESTOR) 40 MG Tab    potassium chloride SA (K-DUR,KLOR-CON) 20 MEQ tablet    furosemide (LASIX) 40 MG tablet     clopidogreL (PLAVIX) 75 mg tablet    carvediloL (COREG) 25 MG tablet    Other Relevant Orders    Lipid Panel (Completed)    Basic Metabolic Panel (Completed)    Hyperlipidemia due to dietary fat intake    Relevant Medications    colesevelam (WELCHOL) 625 mg tablet       Renal/    Encounter for screening mammogram for malignant neoplasm of breast    Relevant Orders    Mammo Digital Screening Bilat       ID    Encounter for vaccination    Relevant Orders    Influenza (FLUAD) - Quadrivalent (Adjuvanted) *Preferred* (65+) (PF) (Completed)    Screening for viral disease    Overview     hep c screening today         Relevant Orders    HIV 1/2 Ag/Ab (4th Gen) (Completed)    Hepatitis C Antibody (Completed)       Palliative Care    Noncompliance with medication treatment due to intermittent use of medication       Other    Smoker unmotivated to quit (Chronic)    Overview     discussed risks of continued behavior. pt expressed understanding.             RTC in one week for blood pressure f/u

## 2022-11-21 ENCOUNTER — OFFICE VISIT (OUTPATIENT)
Dept: PAIN MEDICINE | Facility: CLINIC | Age: 67
End: 2022-11-21
Payer: MEDICARE

## 2022-11-21 ENCOUNTER — HOSPITAL ENCOUNTER (OUTPATIENT)
Dept: RADIOLOGY | Facility: HOSPITAL | Age: 67
Discharge: HOME | End: 2022-11-21
Attending: PAIN MEDICINE
Payer: MEDICARE

## 2022-11-21 VITALS
HEART RATE: 77 BPM | BODY MASS INDEX: 30.39 KG/M2 | SYSTOLIC BLOOD PRESSURE: 193 MMHG | WEIGHT: 178 LBS | DIASTOLIC BLOOD PRESSURE: 103 MMHG | HEIGHT: 64 IN

## 2022-11-21 DIAGNOSIS — Z79.899 ENCOUNTER FOR LONG-TERM (CURRENT) USE OF OTHER MEDICATIONS: Primary | ICD-10-CM

## 2022-11-21 DIAGNOSIS — M54.17 LUMBOSACRAL RADICULOPATHY: ICD-10-CM

## 2022-11-21 DIAGNOSIS — M46.1 SACROILIITIS: ICD-10-CM

## 2022-11-21 DIAGNOSIS — M54.12 CERVICAL RADICULOPATHY: Chronic | ICD-10-CM

## 2022-11-21 PROCEDURE — 72202 X-RAY EXAM SI JOINTS 3/> VWS: CPT | Mod: 26,,, | Performed by: RADIOLOGY

## 2022-11-21 PROCEDURE — 3288F PR FALLS RISK ASSESSMENT DOCUMENTED: ICD-10-PCS | Mod: CPTII,,, | Performed by: PAIN MEDICINE

## 2022-11-21 PROCEDURE — 1159F MED LIST DOCD IN RCRD: CPT | Mod: CPTII,,, | Performed by: PAIN MEDICINE

## 2022-11-21 PROCEDURE — 1101F PR PT FALLS ASSESS DOC 0-1 FALLS W/OUT INJ PAST YR: ICD-10-PCS | Mod: CPTII,,, | Performed by: PAIN MEDICINE

## 2022-11-21 PROCEDURE — 3288F FALL RISK ASSESSMENT DOCD: CPT | Mod: CPTII,,, | Performed by: PAIN MEDICINE

## 2022-11-21 PROCEDURE — 99215 OFFICE O/P EST HI 40 MIN: CPT | Mod: PBBFAC | Performed by: PAIN MEDICINE

## 2022-11-21 PROCEDURE — 3077F PR MOST RECENT SYSTOLIC BLOOD PRESSURE >= 140 MM HG: ICD-10-PCS | Mod: CPTII,,, | Performed by: PAIN MEDICINE

## 2022-11-21 PROCEDURE — 99213 OFFICE O/P EST LOW 20 MIN: CPT | Mod: S$PBB,,, | Performed by: PAIN MEDICINE

## 2022-11-21 PROCEDURE — 72100 X-RAY EXAM L-S SPINE 2/3 VWS: CPT | Mod: TC

## 2022-11-21 PROCEDURE — 72202 X-RAY EXAM SI JOINTS 3/> VWS: CPT | Mod: TC

## 2022-11-21 PROCEDURE — 3008F BODY MASS INDEX DOCD: CPT | Mod: CPTII,,, | Performed by: PAIN MEDICINE

## 2022-11-21 PROCEDURE — 3080F PR MOST RECENT DIASTOLIC BLOOD PRESSURE >= 90 MM HG: ICD-10-PCS | Mod: CPTII,,, | Performed by: PAIN MEDICINE

## 2022-11-21 PROCEDURE — 4010F ACE/ARB THERAPY RXD/TAKEN: CPT | Mod: CPTII,,, | Performed by: PAIN MEDICINE

## 2022-11-21 PROCEDURE — 72202 XR SACROILIAC JOINTS COMPLETE: ICD-10-PCS | Mod: 26,,, | Performed by: RADIOLOGY

## 2022-11-21 PROCEDURE — 3077F SYST BP >= 140 MM HG: CPT | Mod: CPTII,,, | Performed by: PAIN MEDICINE

## 2022-11-21 PROCEDURE — 3080F DIAST BP >= 90 MM HG: CPT | Mod: CPTII,,, | Performed by: PAIN MEDICINE

## 2022-11-21 PROCEDURE — 1159F PR MEDICATION LIST DOCUMENTED IN MEDICAL RECORD: ICD-10-PCS | Mod: CPTII,,, | Performed by: PAIN MEDICINE

## 2022-11-21 PROCEDURE — 1101F PT FALLS ASSESS-DOCD LE1/YR: CPT | Mod: CPTII,,, | Performed by: PAIN MEDICINE

## 2022-11-21 PROCEDURE — 80305 DRUG TEST PRSMV DIR OPT OBS: CPT | Mod: PBBFAC | Performed by: PAIN MEDICINE

## 2022-11-21 PROCEDURE — 72100 XR LUMBAR SPINE AP AND LATERAL: ICD-10-PCS | Mod: 26,,, | Performed by: RADIOLOGY

## 2022-11-21 PROCEDURE — 3008F PR BODY MASS INDEX (BMI) DOCUMENTED: ICD-10-PCS | Mod: CPTII,,, | Performed by: PAIN MEDICINE

## 2022-11-21 PROCEDURE — 72100 X-RAY EXAM L-S SPINE 2/3 VWS: CPT | Mod: 26,,, | Performed by: RADIOLOGY

## 2022-11-21 PROCEDURE — 4010F PR ACE/ARB THEARPY RXD/TAKEN: ICD-10-PCS | Mod: CPTII,,, | Performed by: PAIN MEDICINE

## 2022-11-21 PROCEDURE — 99213 PR OFFICE/OUTPT VISIT, EST, LEVL III, 20-29 MIN: ICD-10-PCS | Mod: S$PBB,,, | Performed by: PAIN MEDICINE

## 2022-11-21 RX ORDER — COLESEVELAM 180 1/1
625 TABLET ORAL DAILY
Qty: 90 TABLET | Refills: 0 | Status: SHIPPED | OUTPATIENT
Start: 2022-11-21 | End: 2023-04-12

## 2022-11-21 RX ORDER — GABAPENTIN 600 MG/1
600 TABLET ORAL EVERY 8 HOURS
Qty: 90 TABLET | Refills: 2 | Status: SHIPPED | OUTPATIENT
Start: 2022-11-21 | End: 2023-01-09 | Stop reason: SDUPTHER

## 2022-11-21 RX ORDER — HYDROCODONE BITARTRATE AND ACETAMINOPHEN 10; 325 MG/1; MG/1
1 TABLET ORAL EVERY 8 HOURS PRN
Qty: 90 TABLET | Refills: 0 | Status: SHIPPED | OUTPATIENT
Start: 2022-11-24 | End: 2022-11-23

## 2022-11-21 NOTE — PATIENT INSTRUCTIONS
BILATERAL SI JI  HOLD PLAVIX FOR 10 DAYS PRIOR TO PROCEDURE  DO NOT HOLD UNTIL CONTACTED BY DR NSACIMENTO'S  NURSE        Procedure Instructions:    Nothing to eat or drink for 8 hours or after midnight including gum, candy, mints, or tobacco products.  If you are scheduled for 1:30 or later nothing to eat or drink after 5 a.m. the morning of the procedure, including gum, candy, mints, or tobacco products.  Must have a  at least 18 yrs of age to stay present at all times  No Diabetic medications the morning of procedure, check blood sugar the morning of procedure, if it is greater than 200 call the office at [phone]  If you are started on antibiotics or have been prescribed antibiotics, have a fever, or have any other type of infection call to reschedule procedure.  If you take blood pressure medications you can take it at your regular scheduled time with a small sip of WATER!    HOLD ASPIRIN AND ASPIRIN PRODUCTS  (ASPIRIN, BC POWDER ETC. ) FOR 7 DAYS  PRIOR TO PROCEDURE  HOLD NSAIDS( ibuprofen, mobic, meloxicam, advil, diclofenac, naproxen, relafen, celebrex,  methotrexate, aleve etc....)  FOR 3 DAYS   PRIOR TO PROCEDURE

## 2022-11-21 NOTE — PROGRESS NOTES
She Disclaimer: This note has been generated using voice-recognition software. There may be typographical errors that have been missed during proof-reading        Patient ID: Ade Grant is a 67 y.o. female.      Chief Complaint: Low-back Pain      67-year-old female returns  for medication refill.  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 Bardales.  She received nerve block injections in 2018 by Dr. Hawkins and  has remained on opiates since that time.  She has repeatedly been informed to obtain updated x-rays of the lumbar spine but she has not complied.  She has also deferred physical therapy due to finances.  At today's office visit. I again stressed the  importance of x-rays in treatment of chronic pain. I sent her  for x-rays today prior to receiving any additional narcotics.  X-rays were reviewed and discussed with patient.  I will schedule bilateral sacroiliac joint injections for bilateral sacroiliitis.        Pain Assessment  Pain Assessment: 0-10  Pain Score:   3  Pain Location: Other (Comment) (lower back)  Pain Descriptors: Burning, Stabbing  Pain Frequency: Intermittent  Pain Onset: Awakened from sleep  Clinical Progression: Gradually improving  Aggravating Factors: Standing, Walking, Other (Comment) (sitting and lying)  Pain Intervention(s): Medication (See eMAR), Heat applied      A's of Opioid Risk Assessment  Activity:Patient can not perform ADL.   Analgesia:Patients pain is barely controlled by current medication.   Adverse Effects: Patient denies constipation or sedation.  Aberrant Behavior:  reviewed with no aberrant drug seeking/taking behavior.      Patient denies any suicidal or homicidal ideations    Physical Therapy/Home Exercise: no      X-Ray Sacroiliac Joints Complete  Narrative: EXAMINATION:  XR SACROILIAC JOINTS COMPLETE    CLINICAL HISTORY:  Sacroiliitis, not elsewhere classified    TECHNIQUE:  AP and bilateral  oblique views of the right and left sacroiliac joint    COMPARISON:  None    FINDINGS:  Mild degenerative changes seen of both sacroiliac joints.  Postoperative changes partially assessed lower lumbar spine and of the left femur.  Impression: As above    Electronically signed by: Pablito Hui  Date:    11/21/2022  Time:    14:46  X-Ray Lumbar Spine AP And Lateral  Narrative: EXAMINATION:  XR LUMBAR SPINE AP AND LATERAL    CLINICAL HISTORY:  Radiculopathy, lumbosacral region    TECHNIQUE:  AP and lateral images were performed of the lumbar spine.    COMPARISON:  02/20/2020    FINDINGS:  Again seen are postoperative changes from L4 through S1 fusion.  The anterolisthesis of L4 on L5 and L5 on S1 are similar.  Hardware projects in expected location.  Degenerative changes throughout most notably at the L3-4 level.  Impression: Similar postoperative changes of the spine.    Electronically signed by: Pablito Hui  Date:    11/21/2022  Time:    14:46      Review of Systems   Constitutional: Negative.    HENT: Negative.     Eyes: Negative.    Respiratory: Negative.     Cardiovascular: Negative.    Gastrointestinal: Negative.    Endocrine: Negative.    Genitourinary: Negative.    Musculoskeletal:  Positive for arthralgias and back pain.   Integumentary:  Negative.   Neurological: Negative.    Hematological: Negative.    Psychiatric/Behavioral: Negative.             Past Medical History:   Diagnosis Date    Anxiety     Cervical radiculopathy     Chronic pain syndrome     COPD (chronic obstructive pulmonary disease)     Depression     GERD (gastroesophageal reflux disease)     Hyperlipidemia     Hypertension     Lumbosacral radiculopathy     Obstructive sleep apnea     Other long term (current) drug therapy     Presence of right artificial knee joint     Vitamin D deficiency      Past Surgical History:   Procedure Laterality Date    ARTHROSCOPY OF HIP Left     CAUDAL EPIDURAL STEROID INJECTION N/A 06/04/2018 2/5/2018    Dr Hawkins     HYSTERECTOMY      KNEE ARTHROSCOPY Right     LUMBAR FUSION       Social History     Socioeconomic History    Marital status:    Tobacco Use    Smoking status: Every Day    Smokeless tobacco: Never   Substance and Sexual Activity    Alcohol use: Not Currently     Family History   Problem Relation Age of Onset    Cancer Mother     Heart disease Mother     Hypertension Mother     Heart disease Father     Thyroid cancer Brother     Bone cancer Brother     Colon cancer Brother     Thyroid cancer Other     Cancer Other      Review of patient's allergies indicates:   Allergen Reactions    Fenofibrate nanocrystallized Rash     has a current medication list which includes the following prescription(s): aspirin, carvedilol, cetirizine, citalopram, clopidogrel, colesevelam, cyclobenzaprine, furosemide, hydrocodone-acetaminophen, lisinopril-hydrochlorothiazide, montelukast, potassium chloride sa, rosuvastatin, gabapentin, and [START ON 11/24/2022] hydrocodone-acetaminophen.      Objective:  Vitals:    11/21/22 1259   BP: (!) 193/103   Pulse: 77        Physical Exam  Vitals and nursing note reviewed.   Constitutional:       General: She is not in acute distress.     Appearance: Normal appearance. She is not ill-appearing, toxic-appearing or diaphoretic.   HENT:      Head: Normocephalic and atraumatic.      Nose: Nose normal.      Mouth/Throat:      Mouth: Mucous membranes are moist.   Eyes:      Extraocular Movements: Extraocular movements intact.      Pupils: Pupils are equal, round, and reactive to light.   Cardiovascular:      Rate and Rhythm: Normal rate and regular rhythm.      Heart sounds: Normal heart sounds.   Pulmonary:      Effort: Pulmonary effort is normal. No respiratory distress.      Breath sounds: Normal breath sounds. No stridor. No wheezing or rhonchi.   Abdominal:      General: Bowel sounds are normal.      Palpations: Abdomen is soft.   Musculoskeletal:         General: No swelling or deformity.       Cervical back: Normal and normal range of motion. No spasms or tenderness. No pain with movement. Normal range of motion.      Thoracic back: Normal.      Lumbar back: Spasms, tenderness and bony tenderness present. Decreased range of motion. Negative right straight leg raise test and negative left straight leg raise test. No scoliosis.      Right lower leg: No edema.      Left lower leg: No edema.      Comments: SI Joint Exam:     Palpation of the bilateral  SI joint is painful.  SURESH test is positive on both sides  Gaenslen test is positive on both sides   Thigh thrust test is positive bilaterally     Skin:     General: Skin is warm.   Neurological:      General: No focal deficit present.      Mental Status: She is alert and oriented to person, place, and time. Mental status is at baseline.      Cranial Nerves: No cranial nerve deficit.      Sensory: Sensation is intact. No sensory deficit.      Motor: No weakness.      Coordination: Coordination normal.      Gait: Gait normal.      Deep Tendon Reflexes: Reflexes are normal and symmetric.   Psychiatric:         Mood and Affect: Mood normal.         Behavior: Behavior normal.         Assessment:      1. Encounter for long-term (current) use of other medications    2. Sacroiliitis    3. Lumbosacral radiculopathy    4. Cervical radiculopathy            Plan:  1. reviewed  2.Addiction, Dependency, Tolerance, Opioid abuse-misuse, Death, Diversion Discussed. Overdose reversal drug Naloxone discussed.  3.Refill/Continue medications for pain control and function       Requested Prescriptions     Signed Prescriptions Disp Refills    HYDROcodone-acetaminophen (NORCO)  mg per tablet 90 tablet 0     Sig: Take 1 tablet by mouth every 8 (eight) hours as needed for Pain.    gabapentin (NEURONTIN) 600 MG tablet 90 tablet 2     Sig: Take 1 tablet (600 mg total) by mouth every 8 (eight) hours.     4.Obtain Xray of the bilateral SI joints  5.Urine drug screen point of care  obtained and consistent with prescribed medications and medication refill date    Orders Placed This Encounter   Procedures    X-Ray Sacroiliac Joints Complete     Standing Status:   Future     Number of Occurrences:   1     Standing Expiration Date:   11/21/2023     Order Specific Question:   May the Radiologist modify the order per protocol to meet the clinical needs of the patient?     Answer:   Yes     Order Specific Question:   Release to patient     Answer:   Immediate    POCT Urine Drug Screen Presump     Interpretive Information:     Negative:  No drug detected at the cut off level.   Positive:  This result represents presumptive positive for the   tested drug, other substances may yield a positive response other   than the analyte of interest. This result should be utilized for   diagnostic purpose only. Confirmation testing will be performed upon physician request only.       Case Request Operating Room: BLOCK, SACROILIAC JOINT     Order Specific Question:   Medical Necessity:     Answer:   Medically Non-Urgent [100]     Order Specific Question:   CPT Code:     Answer:   NV INJECTION,SACROILIAC JOINT [91317]     Order Specific Question:   Positioning:     Answer:   Prone [1003]     Order Specific Question:   Post-Procedure Disposition:     Answer:   PACU [1]     Order Specific Question:   Estimated Length of Stay:     Answer:   0 midnight     Order Specific Question:   Is an on-site pathologist required for this procedure?     Answer:   N/A      6.Monitored Anesthesia Care medical necessity authorization request:    Monitor anesthesia request is medically indicated for the scheduled nerve block procedure due to:  - needle phobia and anxiety, placing  the patient at risk during the provided service.  -patient has severe sleep apnea for which BiPAP and oxygen are needed while sleeping  -patient has severe problems with muscles and muscle spasticity that makes it hard to lie still  -patient suffers from  chronic pain and is unable to function due to  diminished ADLs    7.The planned medically necessary  surgical procedure is performed in a hospital outpatient department and not in an ambulatory surgical center due to:     -there is no geographically assessable ambulatory surgery center that has the  necessary equipment and fluoroscopy needed for the procedure     -there is no geographically assessable ambulatory surgical center available at which the physician has privileges     -an ASC's  specific  guideline regarding the individuals weight or health conditions that prevent the use of an ASC       report:  Reviewed and consistent with medication use as prescribed.      The total time spent for evaluation and management on 11/22/2022 including reviewing separately obtained history, performing a medically appropriate exam and evaluation, documenting clinical information in the health record, independently interpreting results and communicating them to the patient/family/caregiver, and ordering medications/tests/procedures was between 15-29 minutes.    The above plan and management options were discussed at length with patient. Patient is in agreement with the above and verbalized understanding. It will be communicated with the referring physician via electronic record, fax, or mail.

## 2022-11-23 ENCOUNTER — TELEPHONE (OUTPATIENT)
Dept: PAIN MEDICINE | Facility: CLINIC | Age: 67
End: 2022-11-23
Payer: MEDICARE

## 2022-11-23 ENCOUNTER — TELEPHONE (OUTPATIENT)
Dept: PRIMARY CARE CLINIC | Facility: CLINIC | Age: 67
End: 2022-11-23
Payer: MEDICARE

## 2022-11-23 RX ORDER — HYDROCODONE BITARTRATE AND ACETAMINOPHEN 10; 325 MG/1; MG/1
1 TABLET ORAL EVERY 8 HOURS PRN
Qty: 90 TABLET | Refills: 0 | Status: SHIPPED | OUTPATIENT
Start: 2022-11-23 | End: 2022-12-27 | Stop reason: SDUPTHER

## 2022-11-23 NOTE — TELEPHONE ENCOUNTER
Permission was received from KATHERINE ANDRES for plavix to be held for 10 days prior to Bilateral SI JI. Procedure was scheduled for 12-15 at 0840. Pt informed of holding plavix for 10 days prior to procedure, voiced understanding as well as the following pre-op instructions      Procedure Instructions:    Nothing to eat or drink for 8 hours or after midnight including gum, candy, mints, or tobacco products.  If you are scheduled for 1:30 or later nothing to eat or drink after 5 a.m. the morning of the procedure, including gum, candy, mints, or tobacco products.  Must have a  at least 18 yrs of age to stay present at all times  No Diabetic medications the morning of procedure, check blood sugar the morning of procedure, if it is greater than 200 call the office at [phone]  If you are started on antibiotics or have been prescribed antibiotics, have a fever, or have any other type of infection call to reschedule procedure.  If you take blood pressure medications you can take it at your regular scheduled time with a small sip of WATER!    HOLD ASPIRIN AND ASPIRIN PRODUCTS  (ASPIRIN, BC POWDER ETC. ) FOR 7 DAYS  PRIOR TO PROCEDURE  HOLD NSAIDS( ibuprofen, mobic, meloxicam, advil, diclofenac, naproxen, relafen, celebrex,  methotrexate, aleve etc....)  FOR 3 DAYS   PRIOR TO PROCEDURE

## 2022-11-23 NOTE — TELEPHONE ENCOUNTER
Iris called from [facility] in [place] whom states they are out of [facility] but [facility] [place] [facility] and pt requests Norco to be sent there.  Iris will deactivate the Aurora they have. Dr Armas made aware and will send in medication.

## 2022-11-23 NOTE — TELEPHONE ENCOUNTER
----- Message from Mary Schwartz NP sent at 11/21/2022  8:07 AM CST -----  Will you call and ask her if she was fasting? These lipid labs are increasing and before I adjust meds, want to make sure she had not eaten. thanks

## 2022-12-05 PROBLEM — J30.2 SEASONAL ALLERGIES: Status: ACTIVE | Noted: 2022-12-05

## 2022-12-05 PROBLEM — Z91.148 NONCOMPLIANCE WITH MEDICATION TREATMENT DUE TO INTERMITTENT USE OF MEDICATION: Status: ACTIVE | Noted: 2022-12-05

## 2022-12-05 PROBLEM — F17.200 SMOKER UNMOTIVATED TO QUIT: Chronic | Status: ACTIVE | Noted: 2022-12-05

## 2022-12-05 PROBLEM — F32.A DEPRESSION: Status: ACTIVE | Noted: 2022-12-05

## 2022-12-05 PROBLEM — Z23 ENCOUNTER FOR VACCINATION: Status: ACTIVE | Noted: 2022-12-05

## 2022-12-05 PROBLEM — Z11.59 SCREENING FOR VIRAL DISEASE: Status: ACTIVE | Noted: 2022-12-05

## 2022-12-05 PROBLEM — I10 UNCONTROLLED HYPERTENSION: Chronic | Status: ACTIVE | Noted: 2022-12-05

## 2022-12-05 PROBLEM — Z12.31 ENCOUNTER FOR SCREENING MAMMOGRAM FOR MALIGNANT NEOPLASM OF BREAST: Status: ACTIVE | Noted: 2022-12-05

## 2022-12-05 PROBLEM — J30.9 ALLERGIC RHINITIS: Status: ACTIVE | Noted: 2022-12-05

## 2022-12-05 PROBLEM — E78.49 HYPERLIPIDEMIA DUE TO DIETARY FAT INTAKE: Status: ACTIVE | Noted: 2022-12-05

## 2022-12-15 ENCOUNTER — ANESTHESIA EVENT (OUTPATIENT)
Dept: PAIN MEDICINE | Facility: HOSPITAL | Age: 67
End: 2022-12-15
Payer: MEDICARE

## 2022-12-15 ENCOUNTER — ANESTHESIA (OUTPATIENT)
Dept: PAIN MEDICINE | Facility: HOSPITAL | Age: 67
End: 2022-12-15
Payer: MEDICARE

## 2022-12-15 ENCOUNTER — HOSPITAL ENCOUNTER (OUTPATIENT)
Facility: HOSPITAL | Age: 67
Discharge: HOME | End: 2022-12-15
Attending: PAIN MEDICINE | Admitting: PAIN MEDICINE
Payer: MEDICARE

## 2022-12-15 VITALS
WEIGHT: 177 LBS | TEMPERATURE: 99 F | HEART RATE: 63 BPM | DIASTOLIC BLOOD PRESSURE: 70 MMHG | RESPIRATION RATE: 16 BRPM | BODY MASS INDEX: 31.36 KG/M2 | HEIGHT: 63 IN | SYSTOLIC BLOOD PRESSURE: 117 MMHG | OXYGEN SATURATION: 100 %

## 2022-12-15 DIAGNOSIS — M46.1 BILATERAL SACROILIITIS: Primary | ICD-10-CM

## 2022-12-15 PROCEDURE — 27096 INJECT SACROILIAC JOINT: CPT | Mod: 50,,, | Performed by: PAIN MEDICINE

## 2022-12-15 PROCEDURE — 27000284 HC CANNULA NASAL: Performed by: NURSE ANESTHETIST, CERTIFIED REGISTERED

## 2022-12-15 PROCEDURE — D9220A PRA ANESTHESIA: Mod: ,,, | Performed by: NURSE ANESTHETIST, CERTIFIED REGISTERED

## 2022-12-15 PROCEDURE — 25000003 PHARM REV CODE 250: Performed by: PAIN MEDICINE

## 2022-12-15 PROCEDURE — 25000003 PHARM REV CODE 250: Performed by: NURSE ANESTHETIST, CERTIFIED REGISTERED

## 2022-12-15 PROCEDURE — 37000008 HC ANESTHESIA 1ST 15 MINUTES: Performed by: PAIN MEDICINE

## 2022-12-15 PROCEDURE — 63600175 PHARM REV CODE 636 W HCPCS: Performed by: PAIN MEDICINE

## 2022-12-15 PROCEDURE — 27000716 HC OXISENSOR PROBE, ANY SIZE: Performed by: NURSE ANESTHETIST, CERTIFIED REGISTERED

## 2022-12-15 PROCEDURE — 63600175 PHARM REV CODE 636 W HCPCS: Performed by: NURSE ANESTHETIST, CERTIFIED REGISTERED

## 2022-12-15 PROCEDURE — 27096 PR INJECTION,SACROILIAC JOINT: ICD-10-PCS | Mod: 50,,, | Performed by: PAIN MEDICINE

## 2022-12-15 PROCEDURE — 27096 INJECT SACROILIAC JOINT: CPT | Mod: RT | Performed by: PAIN MEDICINE

## 2022-12-15 PROCEDURE — 37000009 HC ANESTHESIA EA ADD 15 MINS: Performed by: PAIN MEDICINE

## 2022-12-15 PROCEDURE — D9220A PRA ANESTHESIA: ICD-10-PCS | Mod: ,,, | Performed by: NURSE ANESTHETIST, CERTIFIED REGISTERED

## 2022-12-15 RX ORDER — TRIAMCINOLONE ACETONIDE 40 MG/ML
INJECTION, SUSPENSION INTRA-ARTICULAR; INTRAMUSCULAR CODE/TRAUMA/SEDATION MEDICATION
Status: DISCONTINUED | OUTPATIENT
Start: 2022-12-15 | End: 2022-12-15 | Stop reason: HOSPADM

## 2022-12-15 RX ORDER — BUPIVACAINE HYDROCHLORIDE 2.5 MG/ML
INJECTION, SOLUTION INFILTRATION; PERINEURAL CODE/TRAUMA/SEDATION MEDICATION
Status: DISCONTINUED | OUTPATIENT
Start: 2022-12-15 | End: 2022-12-15 | Stop reason: HOSPADM

## 2022-12-15 RX ORDER — SODIUM CHLORIDE 9 MG/ML
INJECTION, SOLUTION INTRAVENOUS CONTINUOUS
Status: DISCONTINUED | OUTPATIENT
Start: 2022-12-15 | End: 2022-12-15 | Stop reason: HOSPADM

## 2022-12-15 RX ORDER — PROPOFOL 10 MG/ML
VIAL (ML) INTRAVENOUS
Status: DISCONTINUED | OUTPATIENT
Start: 2022-12-15 | End: 2022-12-15

## 2022-12-15 RX ORDER — LIDOCAINE HYDROCHLORIDE 20 MG/ML
INJECTION, SOLUTION EPIDURAL; INFILTRATION; INTRACAUDAL; PERINEURAL
Status: DISCONTINUED | OUTPATIENT
Start: 2022-12-15 | End: 2022-12-15

## 2022-12-15 RX ADMIN — LIDOCAINE HYDROCHLORIDE 50 MG: 20 INJECTION, SOLUTION INTRAVENOUS at 09:12

## 2022-12-15 RX ADMIN — PROPOFOL 40 MG: 10 INJECTION, EMULSION INTRAVENOUS at 09:12

## 2022-12-15 RX ADMIN — SODIUM CHLORIDE: 9 INJECTION, SOLUTION INTRAVENOUS at 09:12

## 2022-12-15 RX ADMIN — PROPOFOL 50 MG: 10 INJECTION, EMULSION INTRAVENOUS at 09:12

## 2022-12-15 NOTE — ANESTHESIA POSTPROCEDURE EVALUATION
Anesthesia Post Evaluation    Patient: Ade Grant    Procedure(s) Performed: Procedure(s) (LRB):  BLOCK, SACROILIAC JOINT   BILATERAL (Bilateral)    Final Anesthesia Type: general      Patient location: [facility].  Patient participation: Yes- Able to Participate  Level of consciousness: awake and alert  Post-procedure vital signs: reviewed and stable  Pain management: adequate  Airway patency: patent    PONV status at discharge: No PONV  Anesthetic complications: no      Cardiovascular status: blood pressure returned to baseline, hemodynamically stable and stable  Respiratory status: unassisted  Hydration status: euvolemic  Follow-up not needed.  Comments: Pt voices appreciation for care          Vitals Value Taken Time   BP 93/63 12/15/22 1020   Temp 97 F 12/15/22 1528   Pulse 64 12/15/22 1023   Resp 16 12/15/22 1023   SpO2 97 % 12/15/22 1023   Vitals shown include unvalidated device data.      Event Time   Out of Recovery 10:21:00         Pain/Grecia Score: Grecia Score: 10 (12/15/2022 10:20 AM)

## 2022-12-15 NOTE — TRANSFER OF CARE
"Anesthesia Transfer of Care Note    Patient: Ade Grant    Procedure(s) Performed: Procedure(s) (LRB):  BLOCK, SACROILIAC JOINT   BILATERAL (Bilateral)    Patient location: Other[facility]    Anesthesia Type: general    Transport from OR: Transported from OR on room air with adequate spontaneous ventilation    Post pain: adequate analgesia    Post assessment: no apparent anesthetic complications    Post vital signs: stable    Level of consciousness: sedated    Nausea/Vomiting: no nausea/vomiting    Complications: none    Transfer of care protocol was followedComments: Good SV continue, NAD noted, VSS, RTRN      Last vitals:   Visit Vitals  /79 (BP Location: Right arm, Patient Position: Sitting)   Pulse 73   Temp 36.9 °C (98.5 °F) (Oral)   Resp 18   Ht 5' 3" (1.6 m)   Wt 80.3 kg (177 lb)   SpO2 96%   BMI 31.35 kg/m²     "

## 2022-12-15 NOTE — DISCHARGE SUMMARY
[facility]  Discharge Note  Short Stay    Procedure(s) (LRB):  BLOCK, SACROILIAC JOINT   BILATERAL (Bilateral)      OUTCOME: Patient tolerated treatment/procedure well without complication and is now ready for discharge.    DISPOSITION: Home or Self Care    FINAL DIAGNOSIS:  Bilateral sacroiliitis    FOLLOWUP: In clinic    DISCHARGE INSTRUCTIONS:  See nurse's notes     TIME SPENT ON DISCHARGE: 5 minutes

## 2022-12-15 NOTE — OP NOTE
Procedure Note    Procedure Date: 12/15/2022    Procedure Performed:  Bilateral Sacroiliac joint injection, with Fluoroscopic Guidance    Indications: Patient has failed conservative therapy.      Pre-op diagnosis:  Bilateral sided Sacroiliitis    Post-op diagnosis: same    Physician: JACQUELINE Armas MD    Anesthesia: MAC    Medications injected: 0.25% Bupivacaine, 1% Lidocaine, Kenalog 40mg    Local anesthetic used: 1% Lidocaine, 2ml    Estimated Blood Loss: None    Complications:None    Technique:  The patient was interviewed in the holding area and Risks/Benefits were discussed, including, but not limited to, the possibility of new or different pain, bleeding or infection.   All questions were answered.  The patient understood and accepted risks.  Consent was reviewed and signed.   A time out was taken to identify the patient, procedure and side of the procedure.  The skin was prepped with chloroprep and sterile drapes were applied. The bilateral SI joint was identified by fluoroscopy in an oblique plane. Skin and subcutaneous tissues overyling the target area was anesthetized with 1-2 mL of Lidocaine 1%.  A 22g 3 1/2 inch spinal needle was advanced under intermittent fluoroscopy until joint space was entered at the junction of the superior 2/3 & inferior 1/3 of the joint.  There was no paresthesia with needle placement. Aspiration was negative for blood. Next, a 2 cc solution from a mixture of 40 mg kenalog and 4 mL of 0.25% Marcaine was injected without difficulty or resistance into each joint. The needle was removed and a sterile Band-Aid dressing was applied to the puncture site. The patient tolerated the procedure well.  The patient was monitored after the procedure.  The patient was given post procedure and discharge instructions to follow at home. The patient was discharged in a stable condition

## 2022-12-15 NOTE — BRIEF OP NOTE
Discharge Note  Short Stay    Admit Date: 12/15/2022    Discharge Date: 12/15/2022    Attending Physician: Riya Armas     Discharge Provider: Riya Armas    Diagnoses:  Bilateral sacroiliitis    Discharged Condition: Good    Final Diagnoses: Sacroiliitis [M46.1]    Disposition: Home or Self Care    Hospital Course: No complications, uneventful    Outcome of Hospitalization, Treatment, Procedure, or Surgery:  Patient was admitted for outpatient interventional pain management procedure. The patient tolerated the procedure well with no complications.    Follow up/Patient Instructions:  Follow up as scheduled in Pain Management office in 3-4 weeks.  Patient has received instructions and follow up date and time.    Medications:  Continue previous medications

## 2022-12-15 NOTE — ANESTHESIA PREPROCEDURE EVALUATION
12/15/2022  Ade Grant is a 67 y.o., female.    Past Medical History:   Diagnosis Date    Anxiety     Cervical radiculopathy     Chronic pain syndrome     COPD (chronic obstructive pulmonary disease)     Depression     GERD (gastroesophageal reflux disease)     Hyperlipidemia     Hypertension     Lumbosacral radiculopathy     Obstructive sleep apnea     Other long term (current) drug therapy     Presence of right artificial knee joint     Vitamin D deficiency        Past Surgical History:   Procedure Laterality Date    ARTHROSCOPY OF HIP Left     CAUDAL EPIDURAL STEROID INJECTION N/A 06/04/2018 2/5/2018    Dr Hawkins    HYSTERECTOMY      KNEE ARTHROSCOPY Right     LUMBAR FUSION         Family History   Problem Relation Age of Onset    Cancer Mother     Heart disease Mother     Hypertension Mother     Heart disease Father     Thyroid cancer Brother     Bone cancer Brother     Colon cancer Brother     Thyroid cancer Other     Cancer Other        Social History     Socioeconomic History    Marital status:    Tobacco Use    Smoking status: Every Day    Smokeless tobacco: Never   Substance and Sexual Activity    Alcohol use: Not Currently       No current facility-administered medications for this encounter.       Review of patient's allergies indicates:   Allergen Reactions    Fenofibrate nanocrystallized Rash       Pre-op Assessment    I have reviewed the Patient Summary Reports.     I have reviewed the Nursing Notes. I have reviewed the NPO Status.   I have reviewed the Medications.     Review of Systems  Anesthesia Hx:  No problems with previous Anesthesia  Denies Family Hx of Anesthesia complications.   Denies Personal Hx of Anesthesia complications.   Social:  Smoker    Cardiovascular:   Exercise tolerance: poor Hypertension hyperlipidemia ECG has been reviewed.     Pulmonary:   COPD Sleep Apnea Clubbing of fingertips noted   Hepatic/GI:   GERD    Musculoskeletal:  Musculoskeletal General/Symptoms: low back pain, joint pain.  Denies Lumbar Spine Disorders   Neurological:   Neuromuscular Disease,  Pain Syndrome  Chronic Pain Syndrome   Psych:   Psychiatric History depression  Anxiety Disorder.  Depression.          Physical Exam  General: Well nourished, Alert, Oriented and Cooperative    Airway:  Mouth Opening: Normal  Neck ROM: Normal ROM    Chest/Lungs:  Normal Respiratory Rate    Heart:  Rate: Normal        Anesthesia Plan  Type of Anesthesia, risks & benefits discussed:    Anesthesia Type: Gen Natural Airway, MAC  Intra-op Monitoring Plan: Standard ASA Monitors  Post Op Pain Control Plan: multimodal analgesia and IV/PO Opioids PRN  Induction:  IV  Informed Consent: Informed consent signed with the Patient and all parties understand the risks and agree with anesthesia plan.  All questions answered. Patient consented to blood products? Yes  ASA Score: 3  Day of Surgery Review of History & Physical: I have interviewed and examined the patient. I have reviewed the patient's H&P dated: There are no significant changes.     Ready For Surgery From Anesthesia Perspective.     .

## 2022-12-15 NOTE — PLAN OF CARE
REFER TO WRITTEN DOCUMENT AND RECOVERY INFORMATION.    D/CD PATIENT VIAA WHEELCHAIR AT 1029.    INFORMED PATIENT IF UNABLE TO VOID IN 8 HOURS, GO TO ER. NOTIFY MD OF REDNESS OR DRAINAGE FROM INJECTION SITE OR FEVER OVER 3-4 DAY. REST AND DRINK PLENTY OF FLUIDS FOR THE REMAINDER OF THE DAY. NO LIFTING OVER 5 LBS FOR THE REMAINDER OF THE DAY. CONTINUE REGULAR MEDICATIONS AS PRESCRIBED. MAY TAKE PAIN MEDICATION AS PRESCRIBED.     PAIN IMPROVED  100%

## 2022-12-27 RX ORDER — HYDROCODONE BITARTRATE AND ACETAMINOPHEN 10; 325 MG/1; MG/1
1 TABLET ORAL EVERY 8 HOURS PRN
Qty: 90 TABLET | Refills: 0 | Status: SHIPPED | OUTPATIENT
Start: 2022-12-27 | End: 2023-01-09 | Stop reason: SDUPTHER

## 2022-12-27 RX ORDER — HYDROCODONE BITARTRATE AND ACETAMINOPHEN 10; 325 MG/1; MG/1
1 TABLET ORAL EVERY 8 HOURS PRN
Qty: 90 TABLET | Refills: 0 | Status: SHIPPED | OUTPATIENT
Start: 2022-12-27 | End: 2022-12-27

## 2022-12-27 NOTE — TELEPHONE ENCOUNTER
Patient had procedure on 12/15/22. Patient forgot to ask for refill on norco. Per  [facility] # 90 last filled on 11/23/22. Dr. Armas notified and will sen in medication to [facility] in [place].

## 2022-12-27 NOTE — TELEPHONE ENCOUNTER
----- Message from Shabana Jacobsen sent at 12/27/2022  2:22 PM CST -----  Regarding: RX  PT STATES USUAL PHARMACY IS OUT OF MEDS PT CALLED EARLIER TO HAVE MEDS SENT TO [facility] BUT THAT PHARMACY IS OUT ALSO SO  PT WOULD LIKE A NEW RX SENT TO [facility] FOR PAIN MEDS PLEASE CALL PT BACK [phone]

## 2022-12-27 NOTE — TELEPHONE ENCOUNTER
----- Message from Shabana Jacobsen sent at 12/27/2022 10:14 AM CST -----  Regarding: RX  PT STATES SHE HAS BEEN OUT OF PAIN MEDS HER NEXT APT IS 01-09-23 PT WOULD LIKE A RX FOR PAIN MEDS CALLED IN  TO  [facility] IN [place] PLEASE CALL PT BACK [phone] PT REQUEST

## 2023-01-09 ENCOUNTER — OFFICE VISIT (OUTPATIENT)
Dept: PAIN MEDICINE | Facility: CLINIC | Age: 68
End: 2023-01-09
Payer: MEDICARE

## 2023-01-09 VITALS
DIASTOLIC BLOOD PRESSURE: 73 MMHG | BODY MASS INDEX: 31.18 KG/M2 | WEIGHT: 176 LBS | HEIGHT: 63 IN | SYSTOLIC BLOOD PRESSURE: 120 MMHG | RESPIRATION RATE: 18 BRPM | HEART RATE: 68 BPM

## 2023-01-09 DIAGNOSIS — M54.17 LUMBOSACRAL RADICULOPATHY: Chronic | ICD-10-CM

## 2023-01-09 DIAGNOSIS — M46.1 SACROILIITIS: Primary | Chronic | ICD-10-CM

## 2023-01-09 DIAGNOSIS — M54.12 CERVICAL RADICULOPATHY: Chronic | ICD-10-CM

## 2023-01-09 DIAGNOSIS — M62.830 BACK MUSCLE SPASM: ICD-10-CM

## 2023-01-09 PROCEDURE — 1125F AMNT PAIN NOTED PAIN PRSNT: CPT | Mod: CPTII,,, | Performed by: PHYSICIAN ASSISTANT

## 2023-01-09 PROCEDURE — 1101F PT FALLS ASSESS-DOCD LE1/YR: CPT | Mod: CPTII,,, | Performed by: PHYSICIAN ASSISTANT

## 2023-01-09 PROCEDURE — 3074F PR MOST RECENT SYSTOLIC BLOOD PRESSURE < 130 MM HG: ICD-10-PCS | Mod: CPTII,,, | Performed by: PHYSICIAN ASSISTANT

## 2023-01-09 PROCEDURE — 3288F FALL RISK ASSESSMENT DOCD: CPT | Mod: CPTII,,, | Performed by: PHYSICIAN ASSISTANT

## 2023-01-09 PROCEDURE — 99214 OFFICE O/P EST MOD 30 MIN: CPT | Mod: S$PBB,,, | Performed by: PHYSICIAN ASSISTANT

## 2023-01-09 PROCEDURE — 3008F PR BODY MASS INDEX (BMI) DOCUMENTED: ICD-10-PCS | Mod: CPTII,,, | Performed by: PHYSICIAN ASSISTANT

## 2023-01-09 PROCEDURE — 1159F MED LIST DOCD IN RCRD: CPT | Mod: CPTII,,, | Performed by: PHYSICIAN ASSISTANT

## 2023-01-09 PROCEDURE — 3074F SYST BP LT 130 MM HG: CPT | Mod: CPTII,,, | Performed by: PHYSICIAN ASSISTANT

## 2023-01-09 PROCEDURE — 1159F PR MEDICATION LIST DOCUMENTED IN MEDICAL RECORD: ICD-10-PCS | Mod: CPTII,,, | Performed by: PHYSICIAN ASSISTANT

## 2023-01-09 PROCEDURE — 99214 PR OFFICE/OUTPT VISIT, EST, LEVL IV, 30-39 MIN: ICD-10-PCS | Mod: S$PBB,,, | Performed by: PHYSICIAN ASSISTANT

## 2023-01-09 PROCEDURE — 3288F PR FALLS RISK ASSESSMENT DOCUMENTED: ICD-10-PCS | Mod: CPTII,,, | Performed by: PHYSICIAN ASSISTANT

## 2023-01-09 PROCEDURE — 3078F DIAST BP <80 MM HG: CPT | Mod: CPTII,,, | Performed by: PHYSICIAN ASSISTANT

## 2023-01-09 PROCEDURE — 3078F PR MOST RECENT DIASTOLIC BLOOD PRESSURE < 80 MM HG: ICD-10-PCS | Mod: CPTII,,, | Performed by: PHYSICIAN ASSISTANT

## 2023-01-09 PROCEDURE — 1125F PR PAIN SEVERITY QUANTIFIED, PAIN PRESENT: ICD-10-PCS | Mod: CPTII,,, | Performed by: PHYSICIAN ASSISTANT

## 2023-01-09 PROCEDURE — 3008F BODY MASS INDEX DOCD: CPT | Mod: CPTII,,, | Performed by: PHYSICIAN ASSISTANT

## 2023-01-09 PROCEDURE — 99214 OFFICE O/P EST MOD 30 MIN: CPT | Mod: PBBFAC | Performed by: PHYSICIAN ASSISTANT

## 2023-01-09 PROCEDURE — 1101F PR PT FALLS ASSESS DOC 0-1 FALLS W/OUT INJ PAST YR: ICD-10-PCS | Mod: CPTII,,, | Performed by: PHYSICIAN ASSISTANT

## 2023-01-09 RX ORDER — CYCLOBENZAPRINE HCL 10 MG
10 TABLET ORAL EVERY 8 HOURS
Qty: 270 TABLET | Refills: 0 | Status: SHIPPED | OUTPATIENT
Start: 2023-01-09 | End: 2023-04-10

## 2023-01-09 RX ORDER — HYDROCODONE BITARTRATE AND ACETAMINOPHEN 10; 325 MG/1; MG/1
1 TABLET ORAL EVERY 8 HOURS PRN
Qty: 90 TABLET | Refills: 0 | Status: SHIPPED | OUTPATIENT
Start: 2023-03-27 | End: 2023-04-25 | Stop reason: SDUPTHER

## 2023-01-09 RX ORDER — HYDROCODONE BITARTRATE AND ACETAMINOPHEN 10; 325 MG/1; MG/1
1 TABLET ORAL EVERY 8 HOURS PRN
Qty: 90 TABLET | Refills: 0 | Status: SHIPPED | OUTPATIENT
Start: 2023-01-26 | End: 2023-02-25

## 2023-01-09 RX ORDER — NALOXONE HYDROCHLORIDE 4 MG/.1ML
2 SPRAY NASAL ONCE
Qty: 2 EACH | Refills: 0 | Status: SHIPPED | OUTPATIENT
Start: 2023-01-09 | End: 2023-01-09

## 2023-01-09 RX ORDER — GABAPENTIN 600 MG/1
600 TABLET ORAL EVERY 8 HOURS
Qty: 90 TABLET | Refills: 2 | Status: SHIPPED | OUTPATIENT
Start: 2023-01-09 | End: 2023-04-10

## 2023-01-09 RX ORDER — HYDROCODONE BITARTRATE AND ACETAMINOPHEN 10; 325 MG/1; MG/1
1 TABLET ORAL EVERY 8 HOURS PRN
Qty: 90 TABLET | Refills: 0 | Status: SHIPPED | OUTPATIENT
Start: 2023-02-25 | End: 2023-03-27

## 2023-01-09 NOTE — PROGRESS NOTES
Subjective:         Patient ID: Ade Grant is a 68 y.o. female.    Chief Complaint: Low-back Pain, Shoulder Pain, and Leg Pain      Pain  This is a chronic problem. The current episode started more than 1 year ago. The problem occurs daily. The problem has been unchanged. Associated symptoms include arthralgias, myalgias and neck pain. Pertinent negatives include no anorexia, change in bowel habit, chest pain, chills, coughing, diaphoresis, fatigue, fever, rash, sore throat, urinary symptoms, vertigo, visual change or vomiting.   Review of Systems   Constitutional:  Negative for activity change, appetite change, chills, diaphoresis, fatigue, fever and unexpected weight change.   HENT:  Negative for drooling, ear discharge, ear pain, facial swelling, mouth sores, nosebleeds, sore throat, trouble swallowing, voice change and goiter.    Eyes:  Negative for photophobia, pain, discharge, redness and visual disturbance.   Respiratory:  Negative for apnea, cough, choking, chest tightness, shortness of breath, wheezing and stridor.    Cardiovascular:  Negative for chest pain, palpitations and leg swelling.   Gastrointestinal:  Negative for abdominal distention, anorexia, change in bowel habit, diarrhea, rectal pain, vomiting, fecal incontinence and change in bowel habit.   Endocrine: Negative for cold intolerance, heat intolerance, polydipsia, polyphagia and polyuria.   Genitourinary:  Negative for bladder incontinence, dysuria, flank pain, frequency and hot flashes.   Musculoskeletal:  Positive for arthralgias, back pain, leg pain, myalgias and neck pain.   Integumentary:  Negative for color change, pallor and rash.   Allergic/Immunologic: Negative for immunocompromised state.   Neurological:  Negative for dizziness, vertigo, seizures, syncope, facial asymmetry, speech difficulty, light-headedness, coordination difficulties, memory loss and coordination difficulties.   Hematological:  Negative for adenopathy. Does  "not bruise/bleed easily.   Psychiatric/Behavioral:  Negative for agitation, behavioral problems, confusion, decreased concentration, dysphoric mood, hallucinations, self-injury and suicidal ideas. The patient is not nervous/anxious and is not hyperactive.          Past Medical History:   Diagnosis Date    Anxiety     Cervical radiculopathy     Chronic pain syndrome     COPD (chronic obstructive pulmonary disease)     Depression     GERD (gastroesophageal reflux disease)     Hyperlipidemia     Hypertension     Lumbosacral radiculopathy     Obstructive sleep apnea     Other long term (current) drug therapy     Presence of right artificial knee joint     Vitamin D deficiency      Past Surgical History:   Procedure Laterality Date    ARTHROSCOPY OF HIP Left     CAUDAL EPIDURAL STEROID INJECTION N/A 06/04/2018 2/5/2018    Dr Hawkins    HYSTERECTOMY      INJECTION OF ANESTHETIC AGENT INTO SACROILIAC JOINT Bilateral 12/15/2022    Procedure: BLOCK, SACROILIAC JOINT   BILATERAL;  Surgeon: Riya Armas MD;  Location[facility];  Service: Pain Management;  Laterality: Bilateral;    KNEE ARTHROSCOPY Right     LUMBAR FUSION       Social History     Socioeconomic History    Marital status:    Tobacco Use    Smoking status: Every Day    Smokeless tobacco: Never   Substance and Sexual Activity    Alcohol use: Not Currently     Family History   Problem Relation Age of Onset    Cancer Mother     Heart disease Mother     Hypertension Mother     Heart disease Father     Thyroid cancer Brother     Bone cancer Brother     Colon cancer Brother     Thyroid cancer Other     Cancer Other      Review of patient's allergies indicates:   Allergen Reactions    Fenofibrate nanocrystallized Rash        Objective:  Vitals:    01/09/23 1057   BP: 120/73   Pulse: 68   Resp: 18   Weight: 79.8 kg (176 lb)   Height: 5' 3" (1.6 m)   PainSc:   8         Physical Exam  Vitals and nursing note reviewed. Exam conducted with a chaperone " present.   Constitutional:       General: She is awake. She is not in acute distress.     Appearance: Normal appearance. She is not ill-appearing, toxic-appearing or diaphoretic.   HENT:      Head: Normocephalic and atraumatic.      Nose: Nose normal.      Mouth/Throat:      Mouth: Mucous membranes are moist.      Pharynx: Oropharynx is clear.   Eyes:      Conjunctiva/sclera: Conjunctivae normal.      Pupils: Pupils are equal, round, and reactive to light.   Cardiovascular:      Rate and Rhythm: Normal rate.   Pulmonary:      Effort: Pulmonary effort is normal. No respiratory distress.   Abdominal:      Palpations: Abdomen is soft.      Tenderness: There is no guarding.   Musculoskeletal:         General: Normal range of motion.      Cervical back: Normal range of motion and neck supple. Tenderness present. No rigidity.      Thoracic back: Tenderness present.      Lumbar back: Tenderness present.   Skin:     General: Skin is warm and dry.      Coloration: Skin is not jaundiced or pale.   Neurological:      General: No focal deficit present.      Mental Status: She is alert and oriented to person, place, and time. Mental status is at baseline.      Cranial Nerves: No cranial nerve deficit (II-XII).   Psychiatric:         Mood and Affect: Mood normal.         Behavior: Behavior normal. Behavior is cooperative.         Thought Content: Thought content normal.         FL Fluoro for Pain Management  See OP Notes for results.     IMPRESSION: See OP Notes for results.     This procedure was auto-finalized by: Virtual Radiologist       Office Visit on 11/21/2022   Component Date Value Ref Range Status    POC Amphetamines 11/21/2022 Negative  Negative, Inconclusive Final    POC Barbiturates 11/21/2022 Negative  Negative, Inconclusive Final    POC Benzodiazepines 11/21/2022 Negative  Negative, Inconclusive Final    POC Cocaine 11/21/2022 Negative  Negative, Inconclusive Final    POC THC 11/21/2022 Negative  Negative,  Inconclusive Final    POC Methadone 11/21/2022 Negative  Negative, Inconclusive Final    POC Methamphetamine 11/21/2022 Negative  Negative, Inconclusive Final    POC Opiates 11/21/2022 Presumptive Positive (A)  Negative, Inconclusive Final    POC Oxycodone 11/21/2022 Negative  Negative, Inconclusive Final    POC Phencyclidine 11/21/2022 Negative  Negative, Inconclusive Final    POC Methylenedioxymethamphetamine * 11/21/2022 Negative  Negative, Inconclusive Final    POC Tricyclic Antidepressants 11/21/2022 Negative  Negative, Inconclusive Final    POC Buprenorphine 11/21/2022 Negative   Final     Acceptable 11/21/2022 Yes   Final    POC Temperature (Urine) 11/21/2022 94   Final   Office Visit on 11/16/2022   Component Date Value Ref Range Status    HIV 1/2 11/16/2022 Non-Reactive  Non-Reactive Final    Hepatitis C Ab 11/16/2022 Non-Reactive  Non-Reactive Final    Triglycerides 11/16/2022 290 (H)  35 - 150 mg/dL Final    Cholesterol 11/16/2022 285 (H)  0 - 200 mg/dL Final    HDL Cholesterol 11/16/2022 31 (L)  40 - 60 mg/dL Final    Cholesterol/HDL Ratio (Risk Factor) 11/16/2022 9.2   Final    Non-HDL 11/16/2022 254  mg/dL Final    LDL Calculated 11/16/2022 196  mg/dL Final    LDL/HDL 11/16/2022 6.3   Final    VLDL 11/16/2022 58  mg/dL Final    Sodium 11/16/2022 138  136 - 145 mmol/L Final    Potassium 11/16/2022 3.9  3.5 - 5.1 mmol/L Final    Chloride 11/16/2022 104  98 - 107 mmol/L Final    CO2 11/16/2022 27  21 - 32 mmol/L Final    Anion Gap 11/16/2022 11  7 - 16 mmol/L Final    Glucose 11/16/2022 100  74 - 106 mg/dL Final    BUN 11/16/2022 15  7 - 18 mg/dL Final    Creatinine 11/16/2022 0.68  0.55 - 1.02 mg/dL Final    BUN/Creatinine Ratio 11/16/2022 22 (H)  6 - 20 Final    Calcium 11/16/2022 9.2  8.5 - 10.1 mg/dL Final    eGFR 11/16/2022 97  >=60 mL/min/1.73m² Final   Office Visit on 08/25/2022   Component Date Value Ref Range Status    POC Amphetamines 08/25/2022 Negative  Negative, Inconclusive  Final    POC Barbiturates 08/25/2022 Negative  Negative, Inconclusive Final    POC Benzodiazepines 08/25/2022 Negative  Negative, Inconclusive Final    POC Cocaine 08/25/2022 Negative  Negative, Inconclusive Final    POC THC 08/25/2022 Negative  Negative, Inconclusive Final    POC Methadone 08/25/2022 Negative  Negative, Inconclusive Final    POC Methamphetamine 08/25/2022 Negative  Negative, Inconclusive Final    POC Opiates 08/25/2022 Presumptive Positive (A)  Negative, Inconclusive Final    POC Oxycodone 08/25/2022 Negative  Negative, Inconclusive Final    POC Phencyclidine 08/25/2022 Negative  Negative, Inconclusive Final    POC Methylenedioxymethamphetamine * 08/25/2022 Negative  Negative, Inconclusive Final    POC Tricyclic Antidepressants 08/25/2022 Negative  Negative, Inconclusive Final    POC Buprenorphine 08/25/2022 Negative   Final     Acceptable 08/25/2022 Yes   Final    POC Temperature (Urine) 08/25/2022 92   Final         No orders of the defined types were placed in this encounter.      Requested Prescriptions      No prescriptions requested or ordered in this encounter       Assessment:     No diagnosis found.     A's of Opioid Risk Assessment  Activity:Patient can perform ADL.   Analgesia:Patients pain is partially controlled by current medication. Patient has tried OTC medications such as Tylenol and Ibuprofen with out relief.   Adverse Effects: Patient denies constipation or sedation.  Aberrant Behavior:  reviewed with no aberrant drug seeking/taking behavior.  Overdose reversal drug naloxone discussed    Drug screen reviewed      Plan:    Narcan January 2023     History lumbar surgery x2 L4 through S1 lumbar fusion Dr. Villeda    Follow-up after bilateral sacroiliac injection # 1 December 15, 2022, patient states she had 90% relief after procedure, procedure did help improve her level of function     She states at present current medications helping control her  discomfort    She would like to continue conservative management     Continue current medication    Follow-up 3 months     Dr. Armas, December 2023    Bring original prescription medication bottles/container/box with labels to each visit    Pill count    Physical therapy    Massage therapy declines

## 2023-04-10 DIAGNOSIS — Z12.31 SCREENING MAMMOGRAM FOR BREAST CANCER: Primary | ICD-10-CM

## 2023-04-11 ENCOUNTER — TELEPHONE (OUTPATIENT)
Dept: PRIMARY CARE CLINIC | Facility: CLINIC | Age: 68
End: 2023-04-11
Payer: MEDICARE

## 2023-04-11 NOTE — TELEPHONE ENCOUNTER
"----- Message from Loni Dominguez MD sent at 4/11/2023  8:58 AM CDT -----  Pt will need clinic visit as she hasn't been here since November and saw Ms. Hernandez.   ----- Message -----  From: Lani Cantu LPN  Sent: 4/11/2023   8:52 AM CDT  To: Loni Dominguez MD      ----- Message -----  From: Renetta Gray  Sent: 4/10/2023   4:23 PM CDT  To: Angelica John Staff    PT states she needs her meds sent to her insurance ([employer]), there were some sent already today by another provider, she just stated "all the ones that are due". She said she is almost out of some, she said it should be about 5 or 6, I am unsure of exactly what they are. Her phone number is [phone] if she needs a call, thanks         "

## 2023-04-12 ENCOUNTER — OFFICE VISIT (OUTPATIENT)
Dept: PRIMARY CARE CLINIC | Facility: CLINIC | Age: 68
End: 2023-04-12
Payer: MEDICARE

## 2023-04-12 VITALS
OXYGEN SATURATION: 92 % | HEART RATE: 73 BPM | WEIGHT: 176 LBS | TEMPERATURE: 98 F | SYSTOLIC BLOOD PRESSURE: 106 MMHG | DIASTOLIC BLOOD PRESSURE: 75 MMHG | BODY MASS INDEX: 31.18 KG/M2 | RESPIRATION RATE: 18 BRPM | HEIGHT: 63 IN

## 2023-04-12 DIAGNOSIS — E87.6 HYPOKALEMIA: ICD-10-CM

## 2023-04-12 DIAGNOSIS — E78.5 DYSLIPIDEMIA: ICD-10-CM

## 2023-04-12 DIAGNOSIS — F41.9 ANXIETY AND DEPRESSION: ICD-10-CM

## 2023-04-12 DIAGNOSIS — I10 PRIMARY HYPERTENSION: Primary | ICD-10-CM

## 2023-04-12 DIAGNOSIS — J30.2 SEASONAL ALLERGIES: ICD-10-CM

## 2023-04-12 DIAGNOSIS — R00.2 PALPITATIONS: ICD-10-CM

## 2023-04-12 DIAGNOSIS — F17.200 TOBACCO DEPENDENCE SYNDROME: ICD-10-CM

## 2023-04-12 DIAGNOSIS — Z87.891 PERSONAL HISTORY OF NICOTINE DEPENDENCE: ICD-10-CM

## 2023-04-12 DIAGNOSIS — F32.A ANXIETY AND DEPRESSION: ICD-10-CM

## 2023-04-12 PROBLEM — Z11.59 SCREENING FOR VIRAL DISEASE: Status: RESOLVED | Noted: 2022-12-05 | Resolved: 2023-04-12

## 2023-04-12 PROBLEM — Z12.31 ENCOUNTER FOR SCREENING MAMMOGRAM FOR MALIGNANT NEOPLASM OF BREAST: Status: RESOLVED | Noted: 2022-12-05 | Resolved: 2023-04-12

## 2023-04-12 PROBLEM — E78.49 HYPERLIPIDEMIA DUE TO DIETARY FAT INTAKE: Status: RESOLVED | Noted: 2022-12-05 | Resolved: 2023-04-12

## 2023-04-12 PROBLEM — Z23 ENCOUNTER FOR VACCINATION: Status: RESOLVED | Noted: 2022-12-05 | Resolved: 2023-04-12

## 2023-04-12 LAB
ANION GAP SERPL CALCULATED.3IONS-SCNC: 11 MMOL/L (ref 7–16)
BUN SERPL-MCNC: 16 MG/DL (ref 7–18)
BUN/CREAT SERPL: 16 (ref 6–20)
CALCIUM SERPL-MCNC: 9.1 MG/DL (ref 8.5–10.1)
CHLORIDE SERPL-SCNC: 101 MMOL/L (ref 98–107)
CHOLEST SERPL-MCNC: 84 MG/DL (ref 0–200)
CHOLEST/HDLC SERPL: 2.7 {RATIO}
CO2 SERPL-SCNC: 25 MMOL/L (ref 21–32)
CREAT SERPL-MCNC: 1.02 MG/DL (ref 0.55–1.02)
EGFR (NO RACE VARIABLE) (RUSH/TITUS): 61 ML/MIN/1.73M²
GLUCOSE SERPL-MCNC: 117 MG/DL (ref 74–106)
HDLC SERPL-MCNC: 31 MG/DL (ref 40–60)
LDLC SERPL CALC-MCNC: 17 MG/DL
NONHDLC SERPL-MCNC: 53 MG/DL
POTASSIUM SERPL-SCNC: 3.2 MMOL/L (ref 3.5–5.1)
SODIUM SERPL-SCNC: 134 MMOL/L (ref 136–145)
TRIGL SERPL-MCNC: 182 MG/DL (ref 35–150)
VLDLC SERPL-MCNC: 36 MG/DL

## 2023-04-12 PROCEDURE — 99214 PR OFFICE/OUTPT VISIT, EST, LEVL IV, 30-39 MIN: ICD-10-PCS | Mod: 25,,, | Performed by: STUDENT IN AN ORGANIZED HEALTH CARE EDUCATION/TRAINING PROGRAM

## 2023-04-12 PROCEDURE — 80048 BASIC METABOLIC PNL TOTAL CA: CPT | Mod: ,,, | Performed by: CLINICAL MEDICAL LABORATORY

## 2023-04-12 PROCEDURE — 4010F ACE/ARB THERAPY RXD/TAKEN: CPT | Mod: ,,, | Performed by: STUDENT IN AN ORGANIZED HEALTH CARE EDUCATION/TRAINING PROGRAM

## 2023-04-12 PROCEDURE — 80048 BASIC METABOLIC PANEL: ICD-10-PCS | Mod: ,,, | Performed by: CLINICAL MEDICAL LABORATORY

## 2023-04-12 PROCEDURE — 3078F DIAST BP <80 MM HG: CPT | Mod: ,,, | Performed by: STUDENT IN AN ORGANIZED HEALTH CARE EDUCATION/TRAINING PROGRAM

## 2023-04-12 PROCEDURE — 99406 BEHAV CHNG SMOKING 3-10 MIN: CPT | Mod: ,,, | Performed by: STUDENT IN AN ORGANIZED HEALTH CARE EDUCATION/TRAINING PROGRAM

## 2023-04-12 PROCEDURE — 99214 OFFICE O/P EST MOD 30 MIN: CPT | Mod: 25,,, | Performed by: STUDENT IN AN ORGANIZED HEALTH CARE EDUCATION/TRAINING PROGRAM

## 2023-04-12 PROCEDURE — 80061 LIPID PANEL: CPT | Mod: ,,, | Performed by: CLINICAL MEDICAL LABORATORY

## 2023-04-12 PROCEDURE — 99406 PR TOBACCO USE CESSATION INTERMEDIATE 3-10 MINUTES: ICD-10-PCS | Mod: ,,, | Performed by: STUDENT IN AN ORGANIZED HEALTH CARE EDUCATION/TRAINING PROGRAM

## 2023-04-12 PROCEDURE — 3078F PR MOST RECENT DIASTOLIC BLOOD PRESSURE < 80 MM HG: ICD-10-PCS | Mod: ,,, | Performed by: STUDENT IN AN ORGANIZED HEALTH CARE EDUCATION/TRAINING PROGRAM

## 2023-04-12 PROCEDURE — 1159F PR MEDICATION LIST DOCUMENTED IN MEDICAL RECORD: ICD-10-PCS | Mod: ,,, | Performed by: STUDENT IN AN ORGANIZED HEALTH CARE EDUCATION/TRAINING PROGRAM

## 2023-04-12 PROCEDURE — 3008F BODY MASS INDEX DOCD: CPT | Mod: ,,, | Performed by: STUDENT IN AN ORGANIZED HEALTH CARE EDUCATION/TRAINING PROGRAM

## 2023-04-12 PROCEDURE — 3074F PR MOST RECENT SYSTOLIC BLOOD PRESSURE < 130 MM HG: ICD-10-PCS | Mod: ,,, | Performed by: STUDENT IN AN ORGANIZED HEALTH CARE EDUCATION/TRAINING PROGRAM

## 2023-04-12 PROCEDURE — 80061 LIPID PANEL: ICD-10-PCS | Mod: ,,, | Performed by: CLINICAL MEDICAL LABORATORY

## 2023-04-12 PROCEDURE — 3008F PR BODY MASS INDEX (BMI) DOCUMENTED: ICD-10-PCS | Mod: ,,, | Performed by: STUDENT IN AN ORGANIZED HEALTH CARE EDUCATION/TRAINING PROGRAM

## 2023-04-12 PROCEDURE — 1159F MED LIST DOCD IN RCRD: CPT | Mod: ,,, | Performed by: STUDENT IN AN ORGANIZED HEALTH CARE EDUCATION/TRAINING PROGRAM

## 2023-04-12 PROCEDURE — 4010F PR ACE/ARB THEARPY RXD/TAKEN: ICD-10-PCS | Mod: ,,, | Performed by: STUDENT IN AN ORGANIZED HEALTH CARE EDUCATION/TRAINING PROGRAM

## 2023-04-12 PROCEDURE — 3074F SYST BP LT 130 MM HG: CPT | Mod: ,,, | Performed by: STUDENT IN AN ORGANIZED HEALTH CARE EDUCATION/TRAINING PROGRAM

## 2023-04-12 RX ORDER — SERTRALINE HYDROCHLORIDE 50 MG/1
50 TABLET, FILM COATED ORAL DAILY
Qty: 90 TABLET | Refills: 1 | Status: SHIPPED | OUTPATIENT
Start: 2023-04-12 | End: 2024-04-11

## 2023-04-12 RX ORDER — LORATADINE 10 MG/1
10 TABLET ORAL DAILY
Qty: 90 TABLET | Refills: 1 | Status: SHIPPED | OUTPATIENT
Start: 2023-04-12 | End: 2024-04-11

## 2023-04-12 RX ORDER — FUROSEMIDE 40 MG/1
40 TABLET ORAL DAILY
Qty: 90 TABLET | Refills: 1 | Status: SHIPPED | OUTPATIENT
Start: 2023-04-12 | End: 2023-07-19 | Stop reason: SDUPTHER

## 2023-04-12 RX ORDER — CARVEDILOL 25 MG/1
25 TABLET ORAL 2 TIMES DAILY WITH MEALS
Qty: 180 TABLET | Refills: 1 | Status: SHIPPED | OUTPATIENT
Start: 2023-04-12 | End: 2023-05-01 | Stop reason: DRUGHIGH

## 2023-04-12 RX ORDER — MONTELUKAST SODIUM 10 MG/1
10 TABLET ORAL NIGHTLY
Qty: 90 TABLET | Refills: 1 | Status: SHIPPED | OUTPATIENT
Start: 2023-04-12 | End: 2023-10-10

## 2023-04-12 RX ORDER — POTASSIUM CHLORIDE 20 MEQ/1
20 TABLET, EXTENDED RELEASE ORAL DAILY
Qty: 90 TABLET | Refills: 1 | Status: SHIPPED | OUTPATIENT
Start: 2023-04-12 | End: 2023-10-10

## 2023-04-12 RX ORDER — LISINOPRIL AND HYDROCHLOROTHIAZIDE 20; 25 MG/1; MG/1
1 TABLET ORAL DAILY
Qty: 90 TABLET | Refills: 1 | Status: SHIPPED | OUTPATIENT
Start: 2023-04-12 | End: 2023-07-19 | Stop reason: SDUPTHER

## 2023-04-12 RX ORDER — ROSUVASTATIN CALCIUM 40 MG/1
40 TABLET, COATED ORAL NIGHTLY
Qty: 90 TABLET | Refills: 1 | Status: SHIPPED | OUTPATIENT
Start: 2023-04-12 | End: 2024-02-05 | Stop reason: SDUPTHER

## 2023-04-12 RX ORDER — CLOPIDOGREL BISULFATE 75 MG/1
75 TABLET ORAL DAILY
Qty: 90 TABLET | Refills: 1 | Status: SHIPPED | OUTPATIENT
Start: 2023-04-12 | End: 2024-02-05 | Stop reason: SDUPTHER

## 2023-04-12 NOTE — PROGRESS NOTES
Subjective:      Ade Grant is a 68 y.o.female who presents to clinic for Medication Refill    Hypertension:    BP Readings from Last 3 Encounters:   04/12/23 106/75   01/09/23 120/73   12/15/22 117/70      Current medications: carvedilol 25mg twice daily, lisinopril-hctz 20-25mg daily, lasix 40mg daily  Blood pressures at home: doesn't check it  Diet: watches salt intake  Exercise: none    Lab Results   Component Value Date     11/16/2022    K 3.9 11/16/2022    CREATININE 0.68 11/16/2022     Other Chronic conditions:  - Hx of CVA: on plavix 75mg daily  - Allergies: on zyrtec 10mg daily and singulari 10mg daily; feels like it isn't helping as much as it should. Reports uncontrollable sneezing after being outside.   - DLD: on crestor 40mg daily  - Chronic diuretic use/hypokalemia: on Kcl 20mEq daily  - Depression and anxiety: on celexa 20mg daily. Doesn't feel like its helping her symptoms as much as it used to.     PHQ9 4/12/2023   Total Score 12     PHILLY-7 anxiety scale    Not at all Several days More than half the days Nearly every day   Over the last 2 weeks, how often have you been bothered by the following problems?   1. Feeling nervous, anxious, or on edge 0 1 2 3   2. Not being able to stop or control worrying 0 1 2 3   3. Worrying too much about different things 0 1 2 3   4. Trouble relaxing 0 1 2 3   5. Being so restless that it is hard to sit still 0 1 2 3   6. Becoming easily annoyed or irritable 0 1 2 3   7. Feeling afraid as if something awful might happen 0 1 2 3     PHILLY 7 score 9    Patient states she used to see Cardiology in [place] for an enlarged heart and heart racing. She would like to transition care to [place] and get established there. She still gets palpitations regularly and shortness of breath that she says is not normal for her.      Review of Systems   Eyes:  Negative for blurred vision and double vision.   Respiratory:  Positive for cough and shortness of breath. Negative  for wheezing.    Cardiovascular:  Positive for palpitations. Negative for chest pain and leg swelling.   Neurological:  Negative for dizziness and headaches.      Past Medical History:  has a past medical history of Anxiety, Cervical radiculopathy, Chronic pain syndrome, COPD (chronic obstructive pulmonary disease), Depression, GERD (gastroesophageal reflux disease), Hyperlipidemia, Hypertension, Lumbosacral radiculopathy, Obstructive sleep apnea, Other long term (current) drug therapy, Presence of right artificial knee joint, and Vitamin D deficiency.   Past Surgical History:  has a past surgical history that includes Arthroscopy of hip (Left); Lumbar fusion; Hysterectomy; Knee arthroscopy (Right); Caudal epidural steroid injection (N/A, 06/04/2018 2/5/2018); and Injection of anesthetic agent into sacroiliac joint (Bilateral, 12/15/2022).  Family History: family history includes Bone cancer in her brother; Cancer in her mother and another family member; Colon cancer in her brother; Heart disease in her father and mother; Hypertension in her mother; Thyroid cancer in her brother and another family member.  Social History:  reports that she has been smoking cigarettes. She has been smoking an average of 1 pack per day. She has never used smokeless tobacco. She reports that she does not currently use alcohol. She reports current drug use. Drug: Hydrocodone.  Allergies:   Review of patient's allergies indicates:   Allergen Reactions    Fenofibrate nanocrystallized Rash       Objective:     Vitals:    04/12/23 1404   BP: 106/75   Pulse: 73   Resp: 18   Temp: 98.2 °F (36.8 °C)     Physical Exam  Vitals reviewed.   Constitutional:       General: She is not in acute distress.     Appearance: Normal appearance. She is not ill-appearing.   HENT:      Head: Normocephalic and atraumatic.      Right Ear: External ear normal.      Left Ear: External ear normal.   Eyes:      General: No scleral icterus.        Right eye: No  discharge.         Left eye: No discharge.      Conjunctiva/sclera: Conjunctivae normal.   Cardiovascular:      Rate and Rhythm: Normal rate and regular rhythm.      Pulses: Normal pulses.   Pulmonary:      Effort: Pulmonary effort is normal. No respiratory distress.      Breath sounds: Normal breath sounds. No wheezing.   Musculoskeletal:      Right lower leg: No edema.      Left lower leg: No edema.   Neurological:      General: No focal deficit present.      Mental Status: She is alert.   Psychiatric:         Behavior: Behavior normal.        The 10-year ASCVD risk score (Eyal DK, et al., 2019) is: 14.7%    Values used to calculate the score:      Age: 68 years      Sex: Female      Is Non- : No      Diabetic: No      Tobacco smoker: Yes      Systolic Blood Pressure: 106 mmHg      Is BP treated: Yes      HDL Cholesterol: 31 mg/dL      Total Cholesterol: 285 mg/dL    Assessment/Plan:     1. Primary hypertension  - controlled based on clinic values  - cont current regimen  - Basic Metabolic Panel; Future  - carvediloL (COREG) 25 MG tablet; Take 1 tablet (25 mg total) by mouth 2 (two) times daily with meals.  Dispense: 180 tablet; Refill: 1  - furosemide (LASIX) 40 MG tablet; Take 1 tablet (40 mg total) by mouth once daily.  Dispense: 90 tablet; Refill: 1  - lisinopriL-hydrochlorothiazide (PRINZIDE,ZESTORETIC) 20-25 mg Tab; Take 1 tablet by mouth once daily.  Dispense: 90 tablet; Refill: 1  - rosuvastatin (CRESTOR) 40 MG Tab; Take 1 tablet (40 mg total) by mouth nightly.  Dispense: 90 tablet; Refill: 1  - clopidogreL (PLAVIX) 75 mg tablet; Take 1 tablet (75 mg total) by mouth once daily.  Dispense: 90 tablet; Refill: 1  - potassium chloride SA (K-DUR,KLOR-CON) 20 MEQ tablet; Take 1 tablet (20 mEq total) by mouth once daily.  Dispense: 90 tablet; Refill: 1  - Basic Metabolic Panel    2. Anxiety and depression  - stop celexa and start zoloft for uncontrolled depression  - sertraline (ZOLOFT)  50 MG tablet; Take 1 tablet (50 mg total) by mouth once daily.  Dispense: 90 tablet; Refill: 1    3. Seasonal allergies  - change zyrtec to claritin; cont singulair  - montelukast (SINGULAIR) 10 mg tablet; Take 1 tablet (10 mg total) by mouth every evening.  Dispense: 90 tablet; Refill: 1  - loratadine (CLARITIN) 10 mg tablet; Take 1 tablet (10 mg total) by mouth once daily.  Dispense: 90 tablet; Refill: 1    4. Hypokalemia  - recheck labs  - Basic Metabolic Panel; Future  - potassium chloride SA (K-DUR,KLOR-CON) 20 MEQ tablet; Take 1 tablet (20 mEq total) by mouth once daily.  Dispense: 90 tablet; Refill: 1  - Basic Metabolic Panel    5. Dyslipidemia  - lipid panel abnormally elevated at last check in 11/22; repeat today and adjust regimen if indicated  - Lipid Panel; Future  - rosuvastatin (CRESTOR) 40 MG Tab; Take 1 tablet (40 mg total) by mouth nightly.  Dispense: 90 tablet; Refill: 1  - Lipid Panel    6. Palpitations  - Ambulatory referral/consult to Cardiology; Future    7. Personal history of nicotine dependence  - [23552] [place] TOBACCO USE CESSATION INTERMEDIATE 3-10 MIN    8. Tobacco dependence syndrome  - [30320] [place] TOBACCO USE CESSATION INTERMEDIATE 3-10 MIN        Return to clinic in 3 months for f/u chronic conditions or sooner if needed.     Loni Dominguez MD  Family Medicine  04/12/2023      Tobacco Use/Cessation:  I assessed Ade Grant and discussed smoking cessation with her for 3-10 minutes. She reports that she has been smoking cigarettes. She has been smoking an average of 1 pack per day. She has never used smokeless tobacco. She is not ready for smoking cessation yet because it is hard to do. Counseled on risk of continued cigarette use including uncontrolled hypertension and lung cancer.

## 2023-04-20 ENCOUNTER — TELEPHONE (OUTPATIENT)
Dept: PRIMARY CARE CLINIC | Facility: CLINIC | Age: 68
End: 2023-04-20
Payer: MEDICARE

## 2023-04-20 NOTE — TELEPHONE ENCOUNTER
Informed pt of mammo results and to repeat in one year. Letter was sent out to pt before I could speak with her via phone.

## 2023-04-25 ENCOUNTER — PATIENT OUTREACH (OUTPATIENT)
Dept: ADMINISTRATIVE | Facility: HOSPITAL | Age: 68
End: 2023-04-25

## 2023-04-25 NOTE — PROGRESS NOTES
Subjective:         Patient ID: Ade Grant is a 68 y.o. female.    Chief Complaint: Low-back Pain and Hip Pain (left)      Pain  This is a chronic problem. The current episode started more than 1 year ago. The problem occurs daily. The problem has been waxing and waning. Associated symptoms include arthralgias, myalgias and neck pain. Pertinent negatives include no anorexia, chest pain, chills, coughing, diaphoresis, fatigue, fever, sore throat, urinary symptoms, vertigo, visual change or vomiting.   Review of Systems   Constitutional:  Negative for activity change, appetite change, chills, diaphoresis, fatigue, fever and unexpected weight change.   HENT:  Negative for drooling, ear discharge, ear pain, facial swelling, mouth sores, nosebleeds, sore throat, trouble swallowing, voice change and goiter.    Eyes:  Negative for photophobia, pain, discharge, redness and visual disturbance.   Respiratory:  Negative for apnea, cough, choking, chest tightness, shortness of breath, wheezing and stridor.    Cardiovascular:  Negative for chest pain, palpitations and leg swelling.   Gastrointestinal:  Negative for abdominal distention, anorexia, diarrhea, rectal pain, vomiting and fecal incontinence.   Endocrine: Negative for cold intolerance, heat intolerance, polydipsia, polyphagia and polyuria.   Genitourinary:  Negative for bladder incontinence, dysuria, flank pain, frequency and hot flashes.   Musculoskeletal:  Positive for arthralgias, back pain, leg pain, myalgias and neck pain.   Integumentary:  Negative for color change and pallor.   Allergic/Immunologic: Negative for immunocompromised state.   Neurological:  Negative for dizziness, vertigo, seizures, syncope, facial asymmetry, speech difficulty, light-headedness, coordination difficulties, memory loss and coordination difficulties.   Hematological:  Negative for adenopathy. Does not bruise/bleed easily.   Psychiatric/Behavioral:  Negative for agitation,  "behavioral problems, confusion, decreased concentration, dysphoric mood, hallucinations, self-injury and suicidal ideas. The patient is not nervous/anxious and is not hyperactive.          Past Medical History:   Diagnosis Date    Anxiety     Cervical radiculopathy     Chronic pain syndrome     COPD (chronic obstructive pulmonary disease)     Depression     GERD (gastroesophageal reflux disease)     Hyperlipidemia     Hypertension     Lumbosacral radiculopathy     Obstructive sleep apnea     Other long term (current) drug therapy     Presence of right artificial knee joint     Vitamin D deficiency      Past Surgical History:   Procedure Laterality Date    ARTHROSCOPY OF HIP Left     CAUDAL EPIDURAL STEROID INJECTION N/A 06/04/2018 2/5/2018    Dr Hawkins    HYSTERECTOMY      INJECTION OF ANESTHETIC AGENT INTO SACROILIAC JOINT Bilateral 12/15/2022    Procedure: BLOCK, SACROILIAC JOINT   BILATERAL;  Surgeon: Riya Armas MD;  Location: [facility];  Service: Pain Management;  Laterality: Bilateral;    KNEE ARTHROSCOPY Right     LUMBAR FUSION       Social History     Socioeconomic History    Marital status:     Number of children: 3   Tobacco Use    Smoking status: Every Day     Packs/day: 1.00     Types: Cigarettes    Smokeless tobacco: Never   Substance and Sexual Activity    Alcohol use: Not Currently    Drug use: Yes     Types: Hydrocodone    Sexual activity: Not Currently     Family History   Problem Relation Age of Onset    Cancer Mother     Heart disease Mother     Hypertension Mother     Heart disease Father     Thyroid cancer Brother     Bone cancer Brother     Colon cancer Brother     Thyroid cancer Other     Cancer Other      Review of patient's allergies indicates:   Allergen Reactions    Fenofibrate nanocrystallized Rash        Objective:  Vitals:    04/26/23 1258   BP: 127/78   Pulse: 84   Resp: 18   Weight: 79.4 kg (175 lb)   Height: 5' 3" (1.6 m)   PainSc:   7         Physical Exam  Vitals " and nursing note reviewed. Exam conducted with a chaperone present.   Constitutional:       General: She is awake. She is not in acute distress.     Appearance: Normal appearance. She is not ill-appearing or toxic-appearing.   HENT:      Head: Normocephalic and atraumatic.      Nose: Nose normal.      Mouth/Throat:      Mouth: Mucous membranes are moist.      Pharynx: Oropharynx is clear.   Eyes:      Conjunctiva/sclera: Conjunctivae normal.      Pupils: Pupils are equal, round, and reactive to light.   Cardiovascular:      Rate and Rhythm: Normal rate.   Pulmonary:      Effort: Pulmonary effort is normal. No respiratory distress.   Abdominal:      Palpations: Abdomen is soft.      Tenderness: There is no guarding.   Musculoskeletal:         General: Normal range of motion.      Cervical back: Normal range of motion and neck supple. Tenderness present. No rigidity.      Thoracic back: Tenderness present.      Lumbar back: Tenderness present.   Skin:     General: Skin is warm and dry.      Coloration: Skin is not jaundiced or pale.   Neurological:      General: No focal deficit present.      Mental Status: She is alert and oriented to person, place, and time. Mental status is at baseline.      Cranial Nerves: No cranial nerve deficit (II-XII).   Psychiatric:         Mood and Affect: Mood normal.         Behavior: Behavior normal. Behavior is cooperative.         Thought Content: Thought content normal.         Mammo Digital Screening Bilat  Repeat in 1 year        Office Visit on 04/12/2023   Component Date Value Ref Range Status    Sodium 04/12/2023 134 (L)  136 - 145 mmol/L Final    Potassium 04/12/2023 3.2 (L)  3.5 - 5.1 mmol/L Final    Chloride 04/12/2023 101  98 - 107 mmol/L Final    CO2 04/12/2023 25  21 - 32 mmol/L Final    Anion Gap 04/12/2023 11  7 - 16 mmol/L Final    Glucose 04/12/2023 117 (H)  74 - 106 mg/dL Final    BUN 04/12/2023 16  7 - 18 mg/dL Final    Creatinine 04/12/2023 1.02  0.55 - 1.02 mg/dL  Final    BUN/Creatinine Ratio 04/12/2023 16  6 - 20 Final    Calcium 04/12/2023 9.1  8.5 - 10.1 mg/dL Final    eGFR 04/12/2023 61  >=60 mL/min/1.73m² Final    Triglycerides 04/12/2023 182 (H)  35 - 150 mg/dL Final    Cholesterol 04/12/2023 84  0 - 200 mg/dL Final    HDL Cholesterol 04/12/2023 31 (L)  40 - 60 mg/dL Final    Cholesterol/HDL Ratio (Risk Factor) 04/12/2023 2.7   Final    Non-HDL 04/12/2023 53  mg/dL Final    LDL Calculated 04/12/2023 17  mg/dL Final    VLDL 04/12/2023 36  mg/dL Final   Office Visit on 11/21/2022   Component Date Value Ref Range Status    POC Amphetamines 11/21/2022 Negative  Negative, Inconclusive Final    POC Barbiturates 11/21/2022 Negative  Negative, Inconclusive Final    POC Benzodiazepines 11/21/2022 Negative  Negative, Inconclusive Final    POC Cocaine 11/21/2022 Negative  Negative, Inconclusive Final    POC THC 11/21/2022 Negative  Negative, Inconclusive Final    POC Methadone 11/21/2022 Negative  Negative, Inconclusive Final    POC Methamphetamine 11/21/2022 Negative  Negative, Inconclusive Final    POC Opiates 11/21/2022 Presumptive Positive (A)  Negative, Inconclusive Final    POC Oxycodone 11/21/2022 Negative  Negative, Inconclusive Final    POC Phencyclidine 11/21/2022 Negative  Negative, Inconclusive Final    POC Methylenedioxymethamphetamine * 11/21/2022 Negative  Negative, Inconclusive Final    POC Tricyclic Antidepressants 11/21/2022 Negative  Negative, Inconclusive Final    POC Buprenorphine 11/21/2022 Negative   Final     Acceptable 11/21/2022 Yes   Final    POC Temperature (Urine) 11/21/2022 94   Final   Office Visit on 11/16/2022   Component Date Value Ref Range Status    HIV 1/2 11/16/2022 Non-Reactive  Non-Reactive Final    Hepatitis C Ab 11/16/2022 Non-Reactive  Non-Reactive Final    Triglycerides 11/16/2022 290 (H)  35 - 150 mg/dL Final    Cholesterol 11/16/2022 285 (H)  0 - 200 mg/dL Final    HDL Cholesterol 11/16/2022 31 (L)  40 - 60 mg/dL Final     Cholesterol/HDL Ratio (Risk Factor) 11/16/2022 9.2   Final    Non-HDL 11/16/2022 254  mg/dL Final    LDL Calculated 11/16/2022 196  mg/dL Final    LDL/HDL 11/16/2022 6.3   Final    VLDL 11/16/2022 58  mg/dL Final    Sodium 11/16/2022 138  136 - 145 mmol/L Final    Potassium 11/16/2022 3.9  3.5 - 5.1 mmol/L Final    Chloride 11/16/2022 104  98 - 107 mmol/L Final    CO2 11/16/2022 27  21 - 32 mmol/L Final    Anion Gap 11/16/2022 11  7 - 16 mmol/L Final    Glucose 11/16/2022 100  74 - 106 mg/dL Final    BUN 11/16/2022 15  7 - 18 mg/dL Final    Creatinine 11/16/2022 0.68  0.55 - 1.02 mg/dL Final    BUN/Creatinine Ratio 11/16/2022 22 (H)  6 - 20 Final    Calcium 11/16/2022 9.2  8.5 - 10.1 mg/dL Final    eGFR 11/16/2022 97  >=60 mL/min/1.73m² Final         Orders Placed This Encounter   Procedures    Drug Screen Definitive 14, Urine     Standing Status:   Future     Number of Occurrences:   1     Standing Expiration Date:   6/24/2024     Order Specific Question:   Specimen Source     Answer:   Urine    POCT Urine Drug Screen Presump     Interpretive Information:     Negative:  No drug detected at the cut off level.   Positive:  This result represents presumptive positive for the   tested drug, other substances may yield a positive response other   than the analyte of interest. This result should be utilized for   diagnostic purpose only. Confirmation testing will be performed upon physician request only.          Requested Prescriptions     Signed Prescriptions Disp Refills    gabapentin (NEURONTIN) 600 MG tablet 90 tablet 2     Sig: Take 1 tablet (600 mg total) by mouth every 8 (eight) hours.    HYDROcodone-acetaminophen (NORCO)  mg per tablet 90 tablet 0     Sig: Take 1 tablet by mouth every 8 (eight) hours as needed for Pain.    HYDROcodone-acetaminophen (NORCO)  mg per tablet 90 tablet 0     Sig: Take 1 tablet by mouth every 8 (eight) hours as needed for Pain.    HYDROcodone-acetaminophen (NORCO)   mg per tablet 90 tablet 0     Sig: Take 1 tablet by mouth every 8 (eight) hours as needed for Pain.       Assessment:     1. Lumbosacral radiculopathy    2. Back muscle spasm    3. Cervical radiculopathy    4. Sacroiliitis    5. Encounter for long-term (current) use of other medications         A's of Opioid Risk Assessment  Activity:Patient can perform ADL.   Analgesia:Patients pain is partially controlled by current medication. Patient has tried OTC medications such as Tylenol and Ibuprofen with out relief.   Adverse Effects: Patient denies constipation or sedation.  Aberrant Behavior:  reviewed with no aberrant drug seeking/taking behavior.  Overdose reversal drug naloxone discussed    Drug screen reviewed      Plan:    Narcan January 2023     Presumptive drug screen negative April 26, 2023     Last refill date March 28, 2023 Norco 10, number 90 tablets    Will order definitive drug screen for clarification    History lumbar surgery x2 L4 through S1 lumbar fusion Dr. Villeda    She had bilateral sacroiliac injection # 1 December 15, 2022, patient states she had 90% relief after procedure, procedure did help improve her level of function     Complaining some left hip and thigh pain     She is considering having hardware removed from her left thigh/hip     She states current medications helping control her discomfort    She would like to continue conservative management     Continue current medication    Follow-up 3 months     Dr. Armas, December 2023    Bring original prescription medication bottles/container/box with labels to each visit

## 2023-04-25 NOTE — PROGRESS NOTES
Health Maintenance Due   Topic Date Due    COVID-19 Vaccine (1) Never done    TETANUS VACCINE  Never done    Hemoglobin A1c (Diabetic Prevention Screening)  Never done    Shingles Vaccine (1 of 2) Never done    Pneumococcal Vaccines (Age 65+) (2 - PCV) 03/17/2023    TOPICS DUE

## 2023-04-26 ENCOUNTER — OFFICE VISIT (OUTPATIENT)
Dept: PAIN MEDICINE | Facility: CLINIC | Age: 68
End: 2023-04-26
Payer: MEDICARE

## 2023-04-26 VITALS
BODY MASS INDEX: 31.01 KG/M2 | RESPIRATION RATE: 18 BRPM | WEIGHT: 175 LBS | DIASTOLIC BLOOD PRESSURE: 78 MMHG | HEIGHT: 63 IN | SYSTOLIC BLOOD PRESSURE: 127 MMHG | HEART RATE: 84 BPM

## 2023-04-26 DIAGNOSIS — M46.1 SACROILIITIS: Chronic | ICD-10-CM

## 2023-04-26 DIAGNOSIS — M54.12 CERVICAL RADICULOPATHY: Chronic | ICD-10-CM

## 2023-04-26 DIAGNOSIS — Z79.899 ENCOUNTER FOR LONG-TERM (CURRENT) USE OF OTHER MEDICATIONS: ICD-10-CM

## 2023-04-26 DIAGNOSIS — M62.830 BACK MUSCLE SPASM: ICD-10-CM

## 2023-04-26 DIAGNOSIS — M54.17 LUMBOSACRAL RADICULOPATHY: Primary | Chronic | ICD-10-CM

## 2023-04-26 LAB

## 2023-04-26 PROCEDURE — 80305 DRUG TEST PRSMV DIR OPT OBS: CPT | Mod: PBBFAC | Performed by: PHYSICIAN ASSISTANT

## 2023-04-26 PROCEDURE — 3074F PR MOST RECENT SYSTOLIC BLOOD PRESSURE < 130 MM HG: ICD-10-PCS | Mod: CPTII,,, | Performed by: PHYSICIAN ASSISTANT

## 2023-04-26 PROCEDURE — 1100F PTFALLS ASSESS-DOCD GE2>/YR: CPT | Mod: CPTII,,, | Performed by: PHYSICIAN ASSISTANT

## 2023-04-26 PROCEDURE — 1100F PR PT FALLS ASSESS DOC 2+ FALLS/FALL W/INJURY/YR: ICD-10-PCS | Mod: CPTII,,, | Performed by: PHYSICIAN ASSISTANT

## 2023-04-26 PROCEDURE — 3074F SYST BP LT 130 MM HG: CPT | Mod: CPTII,,, | Performed by: PHYSICIAN ASSISTANT

## 2023-04-26 PROCEDURE — G0481 DRUG SCREEN DEFINITIVE 14, URINE: ICD-10-PCS | Mod: ,,, | Performed by: CLINICAL MEDICAL LABORATORY

## 2023-04-26 PROCEDURE — 99214 OFFICE O/P EST MOD 30 MIN: CPT | Mod: PBBFAC | Performed by: PHYSICIAN ASSISTANT

## 2023-04-26 PROCEDURE — 3078F DIAST BP <80 MM HG: CPT | Mod: CPTII,,, | Performed by: PHYSICIAN ASSISTANT

## 2023-04-26 PROCEDURE — 1125F PR PAIN SEVERITY QUANTIFIED, PAIN PRESENT: ICD-10-PCS | Mod: CPTII,,, | Performed by: PHYSICIAN ASSISTANT

## 2023-04-26 PROCEDURE — 1159F PR MEDICATION LIST DOCUMENTED IN MEDICAL RECORD: ICD-10-PCS | Mod: CPTII,,, | Performed by: PHYSICIAN ASSISTANT

## 2023-04-26 PROCEDURE — 1125F AMNT PAIN NOTED PAIN PRSNT: CPT | Mod: CPTII,,, | Performed by: PHYSICIAN ASSISTANT

## 2023-04-26 PROCEDURE — 3078F PR MOST RECENT DIASTOLIC BLOOD PRESSURE < 80 MM HG: ICD-10-PCS | Mod: CPTII,,, | Performed by: PHYSICIAN ASSISTANT

## 2023-04-26 PROCEDURE — 4010F ACE/ARB THERAPY RXD/TAKEN: CPT | Mod: CPTII,,, | Performed by: PHYSICIAN ASSISTANT

## 2023-04-26 PROCEDURE — 3008F PR BODY MASS INDEX (BMI) DOCUMENTED: ICD-10-PCS | Mod: CPTII,,, | Performed by: PHYSICIAN ASSISTANT

## 2023-04-26 PROCEDURE — G0481 DRUG TEST DEF 8-14 CLASSES: HCPCS | Mod: ,,, | Performed by: CLINICAL MEDICAL LABORATORY

## 2023-04-26 PROCEDURE — 3288F PR FALLS RISK ASSESSMENT DOCUMENTED: ICD-10-PCS | Mod: CPTII,,, | Performed by: PHYSICIAN ASSISTANT

## 2023-04-26 PROCEDURE — 99214 PR OFFICE/OUTPT VISIT, EST, LEVL IV, 30-39 MIN: ICD-10-PCS | Mod: S$PBB,,, | Performed by: PHYSICIAN ASSISTANT

## 2023-04-26 PROCEDURE — 1159F MED LIST DOCD IN RCRD: CPT | Mod: CPTII,,, | Performed by: PHYSICIAN ASSISTANT

## 2023-04-26 PROCEDURE — 3288F FALL RISK ASSESSMENT DOCD: CPT | Mod: CPTII,,, | Performed by: PHYSICIAN ASSISTANT

## 2023-04-26 PROCEDURE — 99214 OFFICE O/P EST MOD 30 MIN: CPT | Mod: S$PBB,,, | Performed by: PHYSICIAN ASSISTANT

## 2023-04-26 PROCEDURE — 4010F PR ACE/ARB THEARPY RXD/TAKEN: ICD-10-PCS | Mod: CPTII,,, | Performed by: PHYSICIAN ASSISTANT

## 2023-04-26 PROCEDURE — 3008F BODY MASS INDEX DOCD: CPT | Mod: CPTII,,, | Performed by: PHYSICIAN ASSISTANT

## 2023-04-26 RX ORDER — HYDROCODONE BITARTRATE AND ACETAMINOPHEN 10; 325 MG/1; MG/1
1 TABLET ORAL EVERY 8 HOURS PRN
Qty: 90 TABLET | Refills: 0 | Status: SHIPPED | OUTPATIENT
Start: 2023-04-27 | End: 2023-05-27

## 2023-04-26 RX ORDER — HYDROCODONE BITARTRATE AND ACETAMINOPHEN 10; 325 MG/1; MG/1
1 TABLET ORAL EVERY 8 HOURS PRN
Qty: 90 TABLET | Refills: 0 | Status: SHIPPED | OUTPATIENT
Start: 2023-05-27 | End: 2023-05-11

## 2023-04-26 RX ORDER — CYCLOBENZAPRINE HCL 10 MG
10 TABLET ORAL EVERY 8 HOURS
Qty: 270 TABLET | Refills: 0 | Status: CANCELLED | OUTPATIENT
Start: 2023-04-26

## 2023-04-26 RX ORDER — GABAPENTIN 600 MG/1
600 TABLET ORAL EVERY 8 HOURS
Qty: 90 TABLET | Refills: 2 | Status: SHIPPED | OUTPATIENT
Start: 2023-04-26 | End: 2023-07-19 | Stop reason: SDUPTHER

## 2023-04-26 RX ORDER — HYDROCODONE BITARTRATE AND ACETAMINOPHEN 10; 325 MG/1; MG/1
1 TABLET ORAL EVERY 8 HOURS PRN
Qty: 90 TABLET | Refills: 0 | Status: SHIPPED | OUTPATIENT
Start: 2023-06-26 | End: 2023-05-11

## 2023-05-01 ENCOUNTER — OFFICE VISIT (OUTPATIENT)
Dept: CARDIOLOGY | Facility: CLINIC | Age: 68
End: 2023-05-01
Payer: MEDICARE

## 2023-05-01 VITALS
DIASTOLIC BLOOD PRESSURE: 64 MMHG | WEIGHT: 174 LBS | HEIGHT: 63 IN | SYSTOLIC BLOOD PRESSURE: 90 MMHG | BODY MASS INDEX: 30.83 KG/M2 | OXYGEN SATURATION: 94 % | HEART RATE: 69 BPM

## 2023-05-01 DIAGNOSIS — R06.02 SHORTNESS OF BREATH: ICD-10-CM

## 2023-05-01 DIAGNOSIS — R07.9 CHEST PAIN, UNSPECIFIED TYPE: ICD-10-CM

## 2023-05-01 DIAGNOSIS — I50.9 CONGESTIVE HEART FAILURE, UNSPECIFIED HF CHRONICITY, UNSPECIFIED HEART FAILURE TYPE: ICD-10-CM

## 2023-05-01 DIAGNOSIS — R00.2 PALPITATIONS: ICD-10-CM

## 2023-05-01 DIAGNOSIS — I10 UNCONTROLLED HYPERTENSION: Primary | Chronic | ICD-10-CM

## 2023-05-01 PROCEDURE — 99204 PR OFFICE/OUTPT VISIT, NEW, LEVL IV, 45-59 MIN: ICD-10-PCS | Mod: S$PBB,,, | Performed by: STUDENT IN AN ORGANIZED HEALTH CARE EDUCATION/TRAINING PROGRAM

## 2023-05-01 PROCEDURE — 1126F PR PAIN SEVERITY QUANTIFIED, NO PAIN PRESENT: ICD-10-PCS | Mod: CPTII,,, | Performed by: STUDENT IN AN ORGANIZED HEALTH CARE EDUCATION/TRAINING PROGRAM

## 2023-05-01 PROCEDURE — 3008F PR BODY MASS INDEX (BMI) DOCUMENTED: ICD-10-PCS | Mod: CPTII,,, | Performed by: STUDENT IN AN ORGANIZED HEALTH CARE EDUCATION/TRAINING PROGRAM

## 2023-05-01 PROCEDURE — 93010 EKG 12-LEAD: ICD-10-PCS | Mod: S$PBB,,, | Performed by: STUDENT IN AN ORGANIZED HEALTH CARE EDUCATION/TRAINING PROGRAM

## 2023-05-01 PROCEDURE — 4010F ACE/ARB THERAPY RXD/TAKEN: CPT | Mod: CPTII,,, | Performed by: STUDENT IN AN ORGANIZED HEALTH CARE EDUCATION/TRAINING PROGRAM

## 2023-05-01 PROCEDURE — 3008F BODY MASS INDEX DOCD: CPT | Mod: CPTII,,, | Performed by: STUDENT IN AN ORGANIZED HEALTH CARE EDUCATION/TRAINING PROGRAM

## 2023-05-01 PROCEDURE — 1159F MED LIST DOCD IN RCRD: CPT | Mod: CPTII,,, | Performed by: STUDENT IN AN ORGANIZED HEALTH CARE EDUCATION/TRAINING PROGRAM

## 2023-05-01 PROCEDURE — 3078F DIAST BP <80 MM HG: CPT | Mod: CPTII,,, | Performed by: STUDENT IN AN ORGANIZED HEALTH CARE EDUCATION/TRAINING PROGRAM

## 2023-05-01 PROCEDURE — 1126F AMNT PAIN NOTED NONE PRSNT: CPT | Mod: CPTII,,, | Performed by: STUDENT IN AN ORGANIZED HEALTH CARE EDUCATION/TRAINING PROGRAM

## 2023-05-01 PROCEDURE — 3078F PR MOST RECENT DIASTOLIC BLOOD PRESSURE < 80 MM HG: ICD-10-PCS | Mod: CPTII,,, | Performed by: STUDENT IN AN ORGANIZED HEALTH CARE EDUCATION/TRAINING PROGRAM

## 2023-05-01 PROCEDURE — 1159F PR MEDICATION LIST DOCUMENTED IN MEDICAL RECORD: ICD-10-PCS | Mod: CPTII,,, | Performed by: STUDENT IN AN ORGANIZED HEALTH CARE EDUCATION/TRAINING PROGRAM

## 2023-05-01 PROCEDURE — 99204 OFFICE O/P NEW MOD 45 MIN: CPT | Mod: S$PBB,,, | Performed by: STUDENT IN AN ORGANIZED HEALTH CARE EDUCATION/TRAINING PROGRAM

## 2023-05-01 PROCEDURE — 93005 ELECTROCARDIOGRAM TRACING: CPT | Mod: PBBFAC | Performed by: STUDENT IN AN ORGANIZED HEALTH CARE EDUCATION/TRAINING PROGRAM

## 2023-05-01 PROCEDURE — 3074F SYST BP LT 130 MM HG: CPT | Mod: CPTII,,, | Performed by: STUDENT IN AN ORGANIZED HEALTH CARE EDUCATION/TRAINING PROGRAM

## 2023-05-01 PROCEDURE — 3074F PR MOST RECENT SYSTOLIC BLOOD PRESSURE < 130 MM HG: ICD-10-PCS | Mod: CPTII,,, | Performed by: STUDENT IN AN ORGANIZED HEALTH CARE EDUCATION/TRAINING PROGRAM

## 2023-05-01 PROCEDURE — 4010F PR ACE/ARB THEARPY RXD/TAKEN: ICD-10-PCS | Mod: CPTII,,, | Performed by: STUDENT IN AN ORGANIZED HEALTH CARE EDUCATION/TRAINING PROGRAM

## 2023-05-01 PROCEDURE — 93010 ELECTROCARDIOGRAM REPORT: CPT | Mod: S$PBB,,, | Performed by: STUDENT IN AN ORGANIZED HEALTH CARE EDUCATION/TRAINING PROGRAM

## 2023-05-01 PROCEDURE — 99215 OFFICE O/P EST HI 40 MIN: CPT | Mod: PBBFAC | Performed by: STUDENT IN AN ORGANIZED HEALTH CARE EDUCATION/TRAINING PROGRAM

## 2023-05-01 RX ORDER — CITALOPRAM 40 MG/1
40 TABLET, FILM COATED ORAL DAILY
COMMUNITY

## 2023-05-01 RX ORDER — CETIRIZINE HYDROCHLORIDE 10 MG/1
CAPSULE, LIQUID FILLED ORAL
COMMUNITY

## 2023-05-01 RX ORDER — CARVEDILOL 12.5 MG/1
12.5 TABLET ORAL 2 TIMES DAILY WITH MEALS
Qty: 60 TABLET | Refills: 11 | Status: SHIPPED | OUTPATIENT
Start: 2023-05-01 | End: 2023-07-19 | Stop reason: SDUPTHER

## 2023-05-01 NOTE — PATIENT INSTRUCTIONS
Labs in 1 week  Echo and stress test on May 19, 2023 at 8:00am  Carvedilol decreased to 12.5 mg twice a daily   Follow-up in 2 months

## 2023-05-01 NOTE — PROGRESS NOTES
PCP: Loni Dominguez MD    Referring Provider: Loni Dominguez MD    Subjective:   Ade Grant is a 68 y.o. female with hx of hypertension who presents for a new patient visit to establish care.     Complains of chest pain going on for years. Last saw a cardiologist in [place], [place] about 5-6 years ago who has since retired as well. Complains of central chest pain which sometimes feels like heart burn. Also complains of dyspnea on exertion- ongoing for several years, no echo at least in the last 5 years.  Does not recall having had a stress test or a cardiac catheterization in the past.  She is on lasix 40 mg daily- started recently by Dr. Dominguez.    Fhx: Mother had CABG at around 65 also had PAD, older brother had CABG in his 40s     Shx: Active smoker - 1 PPD for 54 pack years     EKG 5/1/23: NSR, 1st degree AV block, LBBB     ECHO No results found for this or any previous visit.     CATH: No results found for this or any previous visit.     Lab Results   Component Value Date     (L) 04/12/2023    K 3.2 (L) 04/12/2023     04/12/2023    CO2 25 04/12/2023    BUN 16 04/12/2023    CREATININE 1.02 04/12/2023    CALCIUM 9.1 04/12/2023    ANIONGAP 11 04/12/2023    EGFRNONAA 74 03/17/2022       Lab Results   Component Value Date    CHOL 84 04/12/2023    CHOL 285 (H) 11/16/2022    CHOL 97 03/17/2022     Lab Results   Component Value Date    HDL 31 (L) 04/12/2023    HDL 31 (L) 11/16/2022    HDL 31 (L) 03/17/2022     Lab Results   Component Value Date    LDLCALC 17 04/12/2023    LDLCALC 196 11/16/2022    LDLCALC 23 03/17/2022     Lab Results   Component Value Date    TRIG 182 (H) 04/12/2023    TRIG 290 (H) 11/16/2022    TRIG 213 (H) 03/17/2022     Lab Results   Component Value Date    CHOLHDL 2.7 04/12/2023    CHOLHDL 9.2 11/16/2022    CHOLHDL 3.1 03/17/2022       Lab Results   Component Value Date    WBC 8.89 03/17/2022    HGB 15.7 03/17/2022    HCT 47.6 (H) 03/17/2022    MCV 90.2 03/17/2022     03/17/2022            Current Outpatient Medications:     cetirizine (ZYRTEC) 10 mg Cap, Take by mouth., Disp: , Rfl:     citalopram (CELEXA) 40 MG tablet, Take 40 mg by mouth once daily., Disp: , Rfl:     clopidogreL (PLAVIX) 75 mg tablet, Take 1 tablet (75 mg total) by mouth once daily., Disp: 90 tablet, Rfl: 1    cyclobenzaprine (FLEXERIL) 10 MG tablet, TAKE 1 TABLET BY MOUTH EVERY 8 (EIGHT) HOURS., Disp: 270 tablet, Rfl: 0    furosemide (LASIX) 40 MG tablet, Take 1 tablet (40 mg total) by mouth once daily., Disp: 90 tablet, Rfl: 1    gabapentin (NEURONTIN) 600 MG tablet, Take 1 tablet (600 mg total) by mouth every 8 (eight) hours., Disp: 90 tablet, Rfl: 2    HYDROcodone-acetaminophen (NORCO)  mg per tablet, Take 1 tablet by mouth every 8 (eight) hours as needed for Pain., Disp: 90 tablet, Rfl: 0    lisinopriL-hydrochlorothiazide (PRINZIDE,ZESTORETIC) 20-25 mg Tab, Take 1 tablet by mouth once daily., Disp: 90 tablet, Rfl: 1    loratadine (CLARITIN) 10 mg tablet, Take 1 tablet (10 mg total) by mouth once daily., Disp: 90 tablet, Rfl: 1    montelukast (SINGULAIR) 10 mg tablet, Take 1 tablet (10 mg total) by mouth every evening., Disp: 90 tablet, Rfl: 1    potassium chloride SA (K-DUR,KLOR-CON) 20 MEQ tablet, Take 1 tablet (20 mEq total) by mouth once daily., Disp: 90 tablet, Rfl: 1    rosuvastatin (CRESTOR) 40 MG Tab, Take 1 tablet (40 mg total) by mouth nightly., Disp: 90 tablet, Rfl: 1    sertraline (ZOLOFT) 50 MG tablet, Take 1 tablet (50 mg total) by mouth once daily., Disp: 90 tablet, Rfl: 1    carvediloL (COREG) 12.5 MG tablet, Take 1 tablet (12.5 mg total) by mouth 2 (two) times daily with meals., Disp: 60 tablet, Rfl: 11    [START ON 5/27/2023] HYDROcodone-acetaminophen (NORCO)  mg per tablet, Take 1 tablet by mouth every 8 (eight) hours as needed for Pain. (Patient not taking: Reported on 5/1/2023), Disp: 90 tablet, Rfl: 0    [START ON 6/26/2023] HYDROcodone-acetaminophen (NORCO)  mg per tablet, Take  "1 tablet by mouth every 8 (eight) hours as needed for Pain. (Patient not taking: Reported on 5/1/2023), Disp: 90 tablet, Rfl: 0    Review of Systems   Constitutional:  Negative for chills, diaphoresis, fever and malaise/fatigue.   Respiratory:  Positive for shortness of breath. Negative for cough.    Cardiovascular:  Positive for chest pain. Negative for palpitations, orthopnea, claudication, leg swelling and PND.   Gastrointestinal:  Negative for abdominal pain, heartburn, nausea and vomiting.   Neurological:  Negative for dizziness.       Objective:   BP 90/64 (BP Location: Right arm, Patient Position: Sitting)   Pulse 69   Ht 5' 3" (1.6 m)   Wt 78.9 kg (174 lb)   SpO2 (!) 94%   BMI 30.82 kg/m²     Physical Exam  Constitutional:       General: She is not in acute distress.     Appearance: Normal appearance.   Cardiovascular:      Rate and Rhythm: Normal rate and regular rhythm.      Pulses: Normal pulses.      Heart sounds: Normal heart sounds. No murmur heard.    No friction rub. No gallop.   Pulmonary:      Effort: Pulmonary effort is normal.      Breath sounds: Normal breath sounds. No wheezing or rales.   Musculoskeletal:      Right lower leg: No edema.      Left lower leg: No edema.   Skin:     General: Skin is warm and dry.   Neurological:      Mental Status: She is alert.         Assessment:     1. Uncontrolled hypertension  EKG 12-lead    EKG 12-lead      2. Palpitations  Ambulatory referral/consult to Cardiology      3. Congestive heart failure, unspecified HF chronicity, unspecified heart failure type  NT-Pro Natriuretic Peptide    CANCELED: NT-Pro Natriuretic Peptide      4. Shortness of breath  Basic Metabolic Panel    NT-Pro Natriuretic Peptide    Echo    CANCELED: NT-Pro Natriuretic Peptide    CANCELED: Basic Metabolic Panel      5. Chest pain, unspecified type  NM Myocardial Perfusion Spect Multi Pharmacologic    Nuclear Stress Test            Plan:   No problem-specific Assessment & Plan notes " found for this encounter.    Dyspnea on exertion  Check Echo, NTproBNP and CXR   Continue Lasix 40 mg daily   Decrease carvedilol to 12.5 mg twice a day.  Blood pressure 90/64, c/o dizziness.    Chest pain  Risk factors: DM, HTN, former smoking, FH  Intermediate pre-test probability of CAD  Baseline EKG: LBBB (chronicity is unclear)  Recommend pharmacologic stress test with nuclear imaging      Follow-up in 2 months    .

## 2023-05-03 LAB
6-ACETYLMORPHINE, URINE (RUSH): NEGATIVE 10 NG/ML
7-AMINOCLONAZEPAM, URINE (RUSH): NEGATIVE 25 NG/ML
A-HYDROXYALPRAZOLAM, URINE (RUSH): NEGATIVE 25 NG/ML
ACETYL FENTANYL, URINE (RUSH): NEGATIVE 2.5 NG/ML
ACETYL NORFENTANYL OXALATE, URINE (RUSH): NEGATIVE 5 NG/ML
AMPHET UR QL SCN: NEGATIVE
BENZOYLECGONINE, URINE (RUSH): NEGATIVE 100 NG/ML
BUPRENORPHINE UR QL SCN: NEGATIVE 25 NG/ML
CODEINE, URINE (RUSH): NEGATIVE 25 NG/ML
CREAT UR-MCNC: 34 MG/DL (ref 28–219)
EDDP, URINE (RUSH): NEGATIVE 25 NG/ML
FENTANYL, URINE (RUSH): NEGATIVE 2.5 NG/ML
HYDROCODONE, URINE (RUSH): 120.1 25 NG/ML
HYDROMORPHONE, URINE (RUSH): NEGATIVE 25 NG/ML
LORAZEPAM, URINE (RUSH): NEGATIVE 25 NG/ML
METHADONE UR QL SCN: NEGATIVE 25 NG/ML
METHAMPHET UR QL SCN: NEGATIVE
MORPHINE, URINE (RUSH): NEGATIVE 25 NG/ML
NORBUPRENORPHINE, URINE (RUSH): NEGATIVE 25 NG/ML
NORDIAZEPAM, URINE (RUSH): NEGATIVE 25 NG/ML
NORFENTANYL OXALATE, URINE (RUSH): NEGATIVE 5 NG/ML
NORHYDROCODONE, URINE (RUSH): 499.4 50 NG/ML
NOROXYCODONE HCL, URINE (RUSH): NEGATIVE 50 NG/ML
OXAZEPAM, URINE (RUSH): NEGATIVE 25 NG/ML
OXYCODONE UR QL SCN: NEGATIVE 25 NG/ML
OXYMORPHONE, URINE (RUSH): NEGATIVE 25 NG/ML
PH UR STRIP: 8 PH UNITS
SP GR UR STRIP: 1.01
TAPENTADOL, URINE (RUSH): NEGATIVE 25 NG/ML
TEMAZEPAM, URINE (RUSH): NEGATIVE 25 NG/ML
THC-COOH, URINE (RUSH): NEGATIVE 25 NG/ML
TRAMADOL, URINE (RUSH): NEGATIVE 100 NG/ML

## 2023-05-09 DIAGNOSIS — Z71.89 COMPLEX CARE COORDINATION: ICD-10-CM

## 2023-05-11 ENCOUNTER — OFFICE VISIT (OUTPATIENT)
Dept: PRIMARY CARE CLINIC | Facility: CLINIC | Age: 68
End: 2023-05-11
Payer: MEDICARE

## 2023-05-11 VITALS
OXYGEN SATURATION: 94 % | HEIGHT: 63 IN | WEIGHT: 173.31 LBS | SYSTOLIC BLOOD PRESSURE: 130 MMHG | HEART RATE: 77 BPM | BODY MASS INDEX: 30.71 KG/M2 | DIASTOLIC BLOOD PRESSURE: 81 MMHG | RESPIRATION RATE: 18 BRPM | TEMPERATURE: 99 F

## 2023-05-11 DIAGNOSIS — R06.09 DYSPNEA ON EXERTION: Primary | ICD-10-CM

## 2023-05-11 LAB — NT-PROBNP SERPL-MCNC: 75 PG/ML (ref 1–125)

## 2023-05-11 PROCEDURE — 3008F BODY MASS INDEX DOCD: CPT | Mod: ,,, | Performed by: STUDENT IN AN ORGANIZED HEALTH CARE EDUCATION/TRAINING PROGRAM

## 2023-05-11 PROCEDURE — 4010F ACE/ARB THERAPY RXD/TAKEN: CPT | Mod: ,,, | Performed by: STUDENT IN AN ORGANIZED HEALTH CARE EDUCATION/TRAINING PROGRAM

## 2023-05-11 PROCEDURE — 83880 ASSAY OF NATRIURETIC PEPTIDE: CPT | Mod: ,,, | Performed by: CLINICAL MEDICAL LABORATORY

## 2023-05-11 PROCEDURE — 3288F FALL RISK ASSESSMENT DOCD: CPT | Mod: ,,, | Performed by: STUDENT IN AN ORGANIZED HEALTH CARE EDUCATION/TRAINING PROGRAM

## 2023-05-11 PROCEDURE — 1101F PR PT FALLS ASSESS DOC 0-1 FALLS W/OUT INJ PAST YR: ICD-10-PCS | Mod: ,,, | Performed by: STUDENT IN AN ORGANIZED HEALTH CARE EDUCATION/TRAINING PROGRAM

## 2023-05-11 PROCEDURE — 4010F PR ACE/ARB THEARPY RXD/TAKEN: ICD-10-PCS | Mod: ,,, | Performed by: STUDENT IN AN ORGANIZED HEALTH CARE EDUCATION/TRAINING PROGRAM

## 2023-05-11 PROCEDURE — 1101F PT FALLS ASSESS-DOCD LE1/YR: CPT | Mod: ,,, | Performed by: STUDENT IN AN ORGANIZED HEALTH CARE EDUCATION/TRAINING PROGRAM

## 2023-05-11 PROCEDURE — 3075F SYST BP GE 130 - 139MM HG: CPT | Mod: ,,, | Performed by: STUDENT IN AN ORGANIZED HEALTH CARE EDUCATION/TRAINING PROGRAM

## 2023-05-11 PROCEDURE — 3288F PR FALLS RISK ASSESSMENT DOCUMENTED: ICD-10-PCS | Mod: ,,, | Performed by: STUDENT IN AN ORGANIZED HEALTH CARE EDUCATION/TRAINING PROGRAM

## 2023-05-11 PROCEDURE — 99213 OFFICE O/P EST LOW 20 MIN: CPT | Mod: ,,, | Performed by: STUDENT IN AN ORGANIZED HEALTH CARE EDUCATION/TRAINING PROGRAM

## 2023-05-11 PROCEDURE — 99213 PR OFFICE/OUTPT VISIT, EST, LEVL III, 20-29 MIN: ICD-10-PCS | Mod: ,,, | Performed by: STUDENT IN AN ORGANIZED HEALTH CARE EDUCATION/TRAINING PROGRAM

## 2023-05-11 PROCEDURE — 1159F MED LIST DOCD IN RCRD: CPT | Mod: ,,, | Performed by: STUDENT IN AN ORGANIZED HEALTH CARE EDUCATION/TRAINING PROGRAM

## 2023-05-11 PROCEDURE — 1126F PR PAIN SEVERITY QUANTIFIED, NO PAIN PRESENT: ICD-10-PCS | Mod: ,,, | Performed by: STUDENT IN AN ORGANIZED HEALTH CARE EDUCATION/TRAINING PROGRAM

## 2023-05-11 PROCEDURE — 1160F RVW MEDS BY RX/DR IN RCRD: CPT | Mod: ,,, | Performed by: STUDENT IN AN ORGANIZED HEALTH CARE EDUCATION/TRAINING PROGRAM

## 2023-05-11 PROCEDURE — 3075F PR MOST RECENT SYSTOLIC BLOOD PRESS GE 130-139MM HG: ICD-10-PCS | Mod: ,,, | Performed by: STUDENT IN AN ORGANIZED HEALTH CARE EDUCATION/TRAINING PROGRAM

## 2023-05-11 PROCEDURE — 83880 NT-PRO NATRIURETIC PEPTIDE: ICD-10-PCS | Mod: ,,, | Performed by: CLINICAL MEDICAL LABORATORY

## 2023-05-11 PROCEDURE — 3008F PR BODY MASS INDEX (BMI) DOCUMENTED: ICD-10-PCS | Mod: ,,, | Performed by: STUDENT IN AN ORGANIZED HEALTH CARE EDUCATION/TRAINING PROGRAM

## 2023-05-11 PROCEDURE — 1160F PR REVIEW ALL MEDS BY PRESCRIBER/CLIN PHARMACIST DOCUMENTED: ICD-10-PCS | Mod: ,,, | Performed by: STUDENT IN AN ORGANIZED HEALTH CARE EDUCATION/TRAINING PROGRAM

## 2023-05-11 PROCEDURE — 3079F PR MOST RECENT DIASTOLIC BLOOD PRESSURE 80-89 MM HG: ICD-10-PCS | Mod: ,,, | Performed by: STUDENT IN AN ORGANIZED HEALTH CARE EDUCATION/TRAINING PROGRAM

## 2023-05-11 PROCEDURE — 3079F DIAST BP 80-89 MM HG: CPT | Mod: ,,, | Performed by: STUDENT IN AN ORGANIZED HEALTH CARE EDUCATION/TRAINING PROGRAM

## 2023-05-11 PROCEDURE — 1159F PR MEDICATION LIST DOCUMENTED IN MEDICAL RECORD: ICD-10-PCS | Mod: ,,, | Performed by: STUDENT IN AN ORGANIZED HEALTH CARE EDUCATION/TRAINING PROGRAM

## 2023-05-11 PROCEDURE — 1126F AMNT PAIN NOTED NONE PRSNT: CPT | Mod: ,,, | Performed by: STUDENT IN AN ORGANIZED HEALTH CARE EDUCATION/TRAINING PROGRAM

## 2023-05-11 NOTE — PROGRESS NOTES
Subjective:      Ade Grant is a 68 y.o.female who presents to clinic for Follow-up (Follow up for labs per cardiologist)    Patient presents to clinic to get labs done per her Cardiologist. She states she is getting an echocardiogram done soon and she needs to get labs done prior to her procedure. Denies chest pain, palpitations, or shortness of breath.     ROS     Past Medical History:  has a past medical history of Anxiety, Cervical radiculopathy, Chronic pain syndrome, COPD (chronic obstructive pulmonary disease), Depression, GERD (gastroesophageal reflux disease), Hyperlipidemia, Hypertension, Lumbosacral radiculopathy, Obstructive sleep apnea, Other long term (current) drug therapy, Presence of right artificial knee joint, and Vitamin D deficiency.   Past Surgical History:  has a past surgical history that includes Arthroscopy of hip (Left); Lumbar fusion; Hysterectomy; Knee arthroscopy (Right); Caudal epidural steroid injection (N/A, 06/04/2018 2/5/2018); and Injection of anesthetic agent into sacroiliac joint (Bilateral, 12/15/2022).  Family History: family history includes Bone cancer in her brother; Cancer in her mother and another family member; Colon cancer in her brother; Heart disease in her father and mother; Hypertension in her mother; Thyroid cancer in her brother and another family member.  Social History:  reports that she has been smoking cigarettes. She has been smoking an average of 1 pack per day. She has never used smokeless tobacco. She reports that she does not currently use alcohol. She reports current drug use. Drug: Hydrocodone.  Allergies:   Review of patient's allergies indicates:   Allergen Reactions    Fenofibrate nanocrystallized Rash       Objective:     Vitals:    05/11/23 1343   BP: 130/81   Pulse: 77   Resp: 18   Temp: 98.9 °F (37.2 °C)     Physical Exam  Vitals reviewed.   Constitutional:       General: She is not in acute distress.     Appearance: Normal appearance. She  is not ill-appearing, toxic-appearing or diaphoretic.   HENT:      Head: Normocephalic and atraumatic.   Eyes:      General: No scleral icterus.        Right eye: No discharge.         Left eye: No discharge.   Pulmonary:      Effort: Pulmonary effort is normal. No respiratory distress.   Neurological:      General: No focal deficit present.      Mental Status: She is alert.   Psychiatric:         Behavior: Behavior normal.          Assessment/Plan:     1. Dyspnea on exertion  - labs and imaging per Cardiology recs  - NT-Pro Natriuretic Peptide; Future  - X-Ray Chest PA And Lateral; Future  - X-Ray Chest PA And Lateral       Return to clinic as needed.     Loni Dominguez MD  Family Medicine  05/11/2023

## 2023-05-16 ENCOUNTER — TELEPHONE (OUTPATIENT)
Dept: PRIMARY CARE CLINIC | Facility: CLINIC | Age: 68
End: 2023-05-16
Payer: MEDICARE

## 2023-05-19 ENCOUNTER — HOSPITAL ENCOUNTER (OUTPATIENT)
Dept: CARDIOLOGY | Facility: HOSPITAL | Age: 68
Discharge: HOME | End: 2023-05-19
Attending: STUDENT IN AN ORGANIZED HEALTH CARE EDUCATION/TRAINING PROGRAM
Payer: MEDICARE

## 2023-05-19 ENCOUNTER — HOSPITAL ENCOUNTER (OUTPATIENT)
Dept: RADIOLOGY | Facility: HOSPITAL | Age: 68
Discharge: HOME | End: 2023-05-19
Attending: STUDENT IN AN ORGANIZED HEALTH CARE EDUCATION/TRAINING PROGRAM
Payer: MEDICARE

## 2023-05-19 VITALS
HEIGHT: 63 IN | HEART RATE: 75 BPM | SYSTOLIC BLOOD PRESSURE: 91 MMHG | DIASTOLIC BLOOD PRESSURE: 59 MMHG | BODY MASS INDEX: 30.65 KG/M2 | WEIGHT: 173 LBS

## 2023-05-19 VITALS — BODY MASS INDEX: 30.65 KG/M2 | HEIGHT: 63 IN | WEIGHT: 173 LBS

## 2023-05-19 DIAGNOSIS — R07.9 CHEST PAIN, UNSPECIFIED TYPE: ICD-10-CM

## 2023-05-19 DIAGNOSIS — R06.02 SHORTNESS OF BREATH: ICD-10-CM

## 2023-05-19 PROCEDURE — 93306 TTE W/DOPPLER COMPLETE: CPT

## 2023-05-19 PROCEDURE — 93016 NUCLEAR STRESS TEST (CUPID ONLY): ICD-10-PCS | Mod: ,,, | Performed by: REGISTERED NURSE

## 2023-05-19 PROCEDURE — 93016 CV STRESS TEST SUPVJ ONLY: CPT | Mod: ,,, | Performed by: REGISTERED NURSE

## 2023-05-19 PROCEDURE — 93018 NUCLEAR STRESS TEST (CUPID ONLY): ICD-10-PCS | Mod: ,,, | Performed by: STUDENT IN AN ORGANIZED HEALTH CARE EDUCATION/TRAINING PROGRAM

## 2023-05-19 PROCEDURE — A9500 TC99M SESTAMIBI: HCPCS

## 2023-05-19 PROCEDURE — 78452 HT MUSCLE IMAGE SPECT MULT: CPT | Mod: 26,,, | Performed by: STUDENT IN AN ORGANIZED HEALTH CARE EDUCATION/TRAINING PROGRAM

## 2023-05-19 PROCEDURE — 93306 TTE W/DOPPLER COMPLETE: CPT | Mod: 26,,, | Performed by: STUDENT IN AN ORGANIZED HEALTH CARE EDUCATION/TRAINING PROGRAM

## 2023-05-19 PROCEDURE — 93017 CV STRESS TEST TRACING ONLY: CPT

## 2023-05-19 PROCEDURE — 78452 HT MUSCLE IMAGE SPECT MULT: CPT | Mod: TC

## 2023-05-19 PROCEDURE — 63600175 PHARM REV CODE 636 W HCPCS: Performed by: STUDENT IN AN ORGANIZED HEALTH CARE EDUCATION/TRAINING PROGRAM

## 2023-05-19 PROCEDURE — 78452 NM MYOCARDIAL PERFUSION SPECT MULTI PHARM: ICD-10-PCS | Mod: 26,,, | Performed by: STUDENT IN AN ORGANIZED HEALTH CARE EDUCATION/TRAINING PROGRAM

## 2023-05-19 PROCEDURE — 93018 CV STRESS TEST I&R ONLY: CPT | Mod: ,,, | Performed by: STUDENT IN AN ORGANIZED HEALTH CARE EDUCATION/TRAINING PROGRAM

## 2023-05-19 PROCEDURE — 93306 ECHO (CUPID ONLY): ICD-10-PCS | Mod: 26,,, | Performed by: STUDENT IN AN ORGANIZED HEALTH CARE EDUCATION/TRAINING PROGRAM

## 2023-05-19 RX ORDER — REGADENOSON 0.08 MG/ML
0.4 INJECTION, SOLUTION INTRAVENOUS ONCE
Status: COMPLETED | OUTPATIENT
Start: 2023-05-19 | End: 2023-05-19

## 2023-05-19 RX ADMIN — REGADENOSON 0.4 MG: 0.08 INJECTION, SOLUTION INTRAVENOUS at 10:05

## 2023-05-22 LAB
CV STRESS BASE HR: 75 BPM
DIASTOLIC BLOOD PRESSURE: 59 MMHG
OHS CV CPX 1 MINUTE RECOVERY HEART RATE: 74 BPM
OHS CV CPX 85 PERCENT MAX PREDICTED HEART RATE MALE: 126
OHS CV CPX MAX PREDICTED HEART RATE: 148
OHS CV CPX PATIENT IS FEMALE: 1
OHS CV CPX PATIENT IS MALE: 0
OHS CV CPX PEAK DIASTOLIC BLOOD PRESSURE: 74 MMHG
OHS CV CPX PEAK HEAR RATE: 78 BPM
OHS CV CPX PEAK RATE PRESSURE PRODUCT: 7176
OHS CV CPX PEAK SYSTOLIC BLOOD PRESSURE: 92 MMHG
OHS CV CPX PERCENT MAX PREDICTED HEART RATE ACHIEVED: 53
OHS CV CPX RATE PRESSURE PRODUCT PRESENTING: 6825
SYSTOLIC BLOOD PRESSURE: 91 MMHG

## 2023-05-24 LAB
AORTIC VALVE CUSP SEPERATION: 1.89 CM
AV MEAN GRADIENT: 2 MMHG
AV PEAK GRADIENT: 38 MMHG
BSA FOR ECHO PROCEDURE: 1.87 M2
CV ECHO LV RWT: 0.38 CM
DOP CALC AO PEAK VEL: 3.1 M/S
DOP CALC AO VTI: 62.83 CM
DOP CALC LVOT AREA: 2.5 CM2
DOP CALC LVOT DIAMETER: 1.8 CM
E WAVE DECELERATION TIME: 300 MSEC
ECHO LV POSTERIOR WALL: 0.59 CM (ref 0.6–1.1)
EJECTION FRACTION: 55 %
FRACTIONAL SHORTENING: 19 % (ref 28–44)
INTERVENTRICULAR SEPTUM: 0.95 CM (ref 0.6–1.1)
LEFT ATRIUM SIZE: 4.1 CM
LEFT INTERNAL DIMENSION IN SYSTOLE: 2.52 CM (ref 2.1–4)
LEFT VENTRICLE DIASTOLIC VOLUME INDEX: 20.84 ML/M2
LEFT VENTRICLE DIASTOLIC VOLUME: 37.92 ML
LEFT VENTRICLE MASS INDEX: 32 G/M2
LEFT VENTRICLE SYSTOLIC VOLUME INDEX: 12.5 ML/M2
LEFT VENTRICLE SYSTOLIC VOLUME: 22.77 ML
LEFT VENTRICULAR INTERNAL DIMENSION IN DIASTOLE: 3.1 CM (ref 3.5–6)
LEFT VENTRICULAR MASS: 58.93 G
MV PEAK E VEL: 0.63 M/S
PISA TR MAX VEL: 2.28 M/S
RA PRESSURE: 3 MMHG
RIGHT ATRIAL AREA: 7.3 CM2
RIGHT VENTRICULAR END-DIASTOLIC DIMENSION: 2.2 CM
TR MAX PG: 21 MMHG
TRICUSPID ANNULAR PLANE SYSTOLIC EXCURSION: 1.7 CM
TV REST PULMONARY ARTERY PRESSURE: 24 MMHG

## 2023-05-31 NOTE — PROGRESS NOTES
Please inform echo within acceptable limits.  To be discussed further in detail at her next scheduled visit.  Thank you.

## 2023-07-12 ENCOUNTER — OFFICE VISIT (OUTPATIENT)
Dept: PRIMARY CARE CLINIC | Facility: CLINIC | Age: 68
End: 2023-07-12
Payer: MEDICARE

## 2023-07-12 VITALS
RESPIRATION RATE: 18 BRPM | TEMPERATURE: 98 F | BODY MASS INDEX: 30.44 KG/M2 | WEIGHT: 171.81 LBS | HEART RATE: 92 BPM | DIASTOLIC BLOOD PRESSURE: 92 MMHG | OXYGEN SATURATION: 94 % | HEIGHT: 63 IN | SYSTOLIC BLOOD PRESSURE: 158 MMHG

## 2023-07-12 DIAGNOSIS — I10 UNCONTROLLED HYPERTENSION: Primary | ICD-10-CM

## 2023-07-12 PROCEDURE — 3008F PR BODY MASS INDEX (BMI) DOCUMENTED: ICD-10-PCS | Mod: ,,, | Performed by: STUDENT IN AN ORGANIZED HEALTH CARE EDUCATION/TRAINING PROGRAM

## 2023-07-12 PROCEDURE — 3080F PR MOST RECENT DIASTOLIC BLOOD PRESSURE >= 90 MM HG: ICD-10-PCS | Mod: ,,, | Performed by: STUDENT IN AN ORGANIZED HEALTH CARE EDUCATION/TRAINING PROGRAM

## 2023-07-12 PROCEDURE — 3077F PR MOST RECENT SYSTOLIC BLOOD PRESSURE >= 140 MM HG: ICD-10-PCS | Mod: ,,, | Performed by: STUDENT IN AN ORGANIZED HEALTH CARE EDUCATION/TRAINING PROGRAM

## 2023-07-12 PROCEDURE — 3288F PR FALLS RISK ASSESSMENT DOCUMENTED: ICD-10-PCS | Mod: ,,, | Performed by: STUDENT IN AN ORGANIZED HEALTH CARE EDUCATION/TRAINING PROGRAM

## 2023-07-12 PROCEDURE — 1101F PR PT FALLS ASSESS DOC 0-1 FALLS W/OUT INJ PAST YR: ICD-10-PCS | Mod: ,,, | Performed by: STUDENT IN AN ORGANIZED HEALTH CARE EDUCATION/TRAINING PROGRAM

## 2023-07-12 PROCEDURE — 1159F MED LIST DOCD IN RCRD: CPT | Mod: ,,, | Performed by: STUDENT IN AN ORGANIZED HEALTH CARE EDUCATION/TRAINING PROGRAM

## 2023-07-12 PROCEDURE — 3077F SYST BP >= 140 MM HG: CPT | Mod: ,,, | Performed by: STUDENT IN AN ORGANIZED HEALTH CARE EDUCATION/TRAINING PROGRAM

## 2023-07-12 PROCEDURE — 3080F DIAST BP >= 90 MM HG: CPT | Mod: ,,, | Performed by: STUDENT IN AN ORGANIZED HEALTH CARE EDUCATION/TRAINING PROGRAM

## 2023-07-12 PROCEDURE — 1101F PT FALLS ASSESS-DOCD LE1/YR: CPT | Mod: ,,, | Performed by: STUDENT IN AN ORGANIZED HEALTH CARE EDUCATION/TRAINING PROGRAM

## 2023-07-12 PROCEDURE — 4010F PR ACE/ARB THEARPY RXD/TAKEN: ICD-10-PCS | Mod: ,,, | Performed by: STUDENT IN AN ORGANIZED HEALTH CARE EDUCATION/TRAINING PROGRAM

## 2023-07-12 PROCEDURE — 99213 PR OFFICE/OUTPT VISIT, EST, LEVL III, 20-29 MIN: ICD-10-PCS | Mod: ,,, | Performed by: STUDENT IN AN ORGANIZED HEALTH CARE EDUCATION/TRAINING PROGRAM

## 2023-07-12 PROCEDURE — 1159F PR MEDICATION LIST DOCUMENTED IN MEDICAL RECORD: ICD-10-PCS | Mod: ,,, | Performed by: STUDENT IN AN ORGANIZED HEALTH CARE EDUCATION/TRAINING PROGRAM

## 2023-07-12 PROCEDURE — 1160F PR REVIEW ALL MEDS BY PRESCRIBER/CLIN PHARMACIST DOCUMENTED: ICD-10-PCS | Mod: ,,, | Performed by: STUDENT IN AN ORGANIZED HEALTH CARE EDUCATION/TRAINING PROGRAM

## 2023-07-12 PROCEDURE — 3008F BODY MASS INDEX DOCD: CPT | Mod: ,,, | Performed by: STUDENT IN AN ORGANIZED HEALTH CARE EDUCATION/TRAINING PROGRAM

## 2023-07-12 PROCEDURE — 1126F PR PAIN SEVERITY QUANTIFIED, NO PAIN PRESENT: ICD-10-PCS | Mod: ,,, | Performed by: STUDENT IN AN ORGANIZED HEALTH CARE EDUCATION/TRAINING PROGRAM

## 2023-07-12 PROCEDURE — 4010F ACE/ARB THERAPY RXD/TAKEN: CPT | Mod: ,,, | Performed by: STUDENT IN AN ORGANIZED HEALTH CARE EDUCATION/TRAINING PROGRAM

## 2023-07-12 PROCEDURE — 3288F FALL RISK ASSESSMENT DOCD: CPT | Mod: ,,, | Performed by: STUDENT IN AN ORGANIZED HEALTH CARE EDUCATION/TRAINING PROGRAM

## 2023-07-12 PROCEDURE — 1160F RVW MEDS BY RX/DR IN RCRD: CPT | Mod: ,,, | Performed by: STUDENT IN AN ORGANIZED HEALTH CARE EDUCATION/TRAINING PROGRAM

## 2023-07-12 PROCEDURE — 99213 OFFICE O/P EST LOW 20 MIN: CPT | Mod: ,,, | Performed by: STUDENT IN AN ORGANIZED HEALTH CARE EDUCATION/TRAINING PROGRAM

## 2023-07-12 PROCEDURE — 1126F AMNT PAIN NOTED NONE PRSNT: CPT | Mod: ,,, | Performed by: STUDENT IN AN ORGANIZED HEALTH CARE EDUCATION/TRAINING PROGRAM

## 2023-07-12 NOTE — PROGRESS NOTES
Subjective:      Ade Grant is a 68 y.o.female who presents to clinic for Follow-up (On stress test)    Patient presents to clinic to discuss stress test results and medication refills.     Per chart review, she had her stress test with Cardiology and is scheduled for a f/u with Dr. Ramirez next week. She has not taken her blood pressure medications today due to being rushed to making it to her clinic appointment on time. Doesn't check her blood pressures at home, but usually well controlled in clinic. Is still having intermittent CP.     ROS     Past Medical History:  has a past medical history of Anxiety, Cervical radiculopathy, Chronic pain syndrome, COPD (chronic obstructive pulmonary disease), Depression, GERD (gastroesophageal reflux disease), Hyperlipidemia, Hypertension, Lumbosacral radiculopathy, Obstructive sleep apnea, Other long term (current) drug therapy, Presence of right artificial knee joint, and Vitamin D deficiency.   Past Surgical History:  has a past surgical history that includes Arthroscopy of hip (Left); Lumbar fusion; Hysterectomy; Knee arthroscopy (Right); Caudal epidural steroid injection (N/A, 06/04/2018 2/5/2018); and Injection of anesthetic agent into sacroiliac joint (Bilateral, 12/15/2022).  Family History: family history includes Bone cancer in her brother; Cancer in her mother and another family member; Colon cancer in her brother; Heart disease in her father and mother; Hypertension in her mother; Thyroid cancer in her brother and another family member.  Social History:  reports that she has been smoking cigarettes. She has been smoking an average of 1 pack per day. She has never used smokeless tobacco. She reports that she does not currently use alcohol. She reports current drug use. Drug: Hydrocodone.  Allergies:   Review of patient's allergies indicates:   Allergen Reactions    Fenofibrate nanocrystallized Rash       Objective:     Vitals:    07/12/23 1330   BP: (!) 170/96    Pulse:    Resp:    Temp:      Physical Exam  Vitals reviewed.   Constitutional:       General: She is not in acute distress.     Appearance: Normal appearance. She is not ill-appearing.   HENT:      Head: Normocephalic and atraumatic.      Right Ear: External ear normal.      Left Ear: External ear normal.   Eyes:      General: No scleral icterus.        Right eye: No discharge.         Left eye: No discharge.      Conjunctiva/sclera: Conjunctivae normal.   Cardiovascular:      Rate and Rhythm: Normal rate and regular rhythm.      Pulses: Normal pulses.   Pulmonary:      Effort: Pulmonary effort is normal. No respiratory distress.      Breath sounds: Normal breath sounds. No wheezing.   Musculoskeletal:      Right lower leg: No edema.      Left lower leg: No edema.   Neurological:      General: No focal deficit present.      Mental Status: She is alert.   Psychiatric:         Behavior: Behavior normal.        The ASCVD Risk score (Eyal KINNEY, et al., 2019) failed to calculate for the following reasons:    The valid total cholesterol range is 130 to 320 mg/dL    Assessment/Plan:     1. Uncontrolled hypertension  - likely uncontrolled from not taking meds today as her pressures are usually very well controlled  - cont current regimen and pt to RTC in 1 week for BP check; will refill meds if normalized then    PCV 20 today - pt left clinic prior to getting vaccine; will get at next visit  Health Maintenance Due   Topic Date Due    COVID-19 Vaccine (1) Never done    TETANUS VACCINE  Never done    Hemoglobin A1c (Diabetic Prevention Screening)  Never done    Shingles Vaccine (1 of 2) Never done    Pneumococcal Vaccines (Age 65+) (2 - PCV) 03/17/2023     Face to face encounter time 15 minutes.    Non face to face encounter time 10 minutes.  Reviewing separate obtained history, counseling and educating the patient, family and/or other caregivers, ordering medications, tests or procedures; referring and communicating with  other health care professionals; documenting clinical information in the electronic medical record; care coordination; independently interpreting results and communicating results to the patient, family, and/or caregivers.    Total time for visit  25 minutes        Return to clinic in 1 week for HTN check or sooner if needed.     Loni Dominguez MD  Family Medicine  07/12/2023

## 2023-07-19 ENCOUNTER — OFFICE VISIT (OUTPATIENT)
Dept: PRIMARY CARE CLINIC | Facility: CLINIC | Age: 68
End: 2023-07-19
Payer: MEDICARE

## 2023-07-19 VITALS
DIASTOLIC BLOOD PRESSURE: 80 MMHG | BODY MASS INDEX: 30.3 KG/M2 | OXYGEN SATURATION: 99 % | HEIGHT: 63 IN | TEMPERATURE: 98 F | SYSTOLIC BLOOD PRESSURE: 142 MMHG | HEART RATE: 91 BPM | RESPIRATION RATE: 18 BRPM | WEIGHT: 171 LBS

## 2023-07-19 DIAGNOSIS — I10 UNCONTROLLED HYPERTENSION: Primary | Chronic | ICD-10-CM

## 2023-07-19 PROCEDURE — 3008F BODY MASS INDEX DOCD: CPT | Mod: ,,, | Performed by: STUDENT IN AN ORGANIZED HEALTH CARE EDUCATION/TRAINING PROGRAM

## 2023-07-19 PROCEDURE — 1126F PR PAIN SEVERITY QUANTIFIED, NO PAIN PRESENT: ICD-10-PCS | Mod: ,,, | Performed by: STUDENT IN AN ORGANIZED HEALTH CARE EDUCATION/TRAINING PROGRAM

## 2023-07-19 PROCEDURE — 3077F PR MOST RECENT SYSTOLIC BLOOD PRESSURE >= 140 MM HG: ICD-10-PCS | Mod: ,,, | Performed by: STUDENT IN AN ORGANIZED HEALTH CARE EDUCATION/TRAINING PROGRAM

## 2023-07-19 PROCEDURE — 3008F PR BODY MASS INDEX (BMI) DOCUMENTED: ICD-10-PCS | Mod: ,,, | Performed by: STUDENT IN AN ORGANIZED HEALTH CARE EDUCATION/TRAINING PROGRAM

## 2023-07-19 PROCEDURE — 4010F PR ACE/ARB THEARPY RXD/TAKEN: ICD-10-PCS | Mod: ,,, | Performed by: STUDENT IN AN ORGANIZED HEALTH CARE EDUCATION/TRAINING PROGRAM

## 2023-07-19 PROCEDURE — 1160F PR REVIEW ALL MEDS BY PRESCRIBER/CLIN PHARMACIST DOCUMENTED: ICD-10-PCS | Mod: ,,, | Performed by: STUDENT IN AN ORGANIZED HEALTH CARE EDUCATION/TRAINING PROGRAM

## 2023-07-19 PROCEDURE — 1159F PR MEDICATION LIST DOCUMENTED IN MEDICAL RECORD: ICD-10-PCS | Mod: ,,, | Performed by: STUDENT IN AN ORGANIZED HEALTH CARE EDUCATION/TRAINING PROGRAM

## 2023-07-19 PROCEDURE — 3079F PR MOST RECENT DIASTOLIC BLOOD PRESSURE 80-89 MM HG: ICD-10-PCS | Mod: ,,, | Performed by: STUDENT IN AN ORGANIZED HEALTH CARE EDUCATION/TRAINING PROGRAM

## 2023-07-19 PROCEDURE — 99213 OFFICE O/P EST LOW 20 MIN: CPT | Mod: ,,, | Performed by: STUDENT IN AN ORGANIZED HEALTH CARE EDUCATION/TRAINING PROGRAM

## 2023-07-19 PROCEDURE — 1160F RVW MEDS BY RX/DR IN RCRD: CPT | Mod: ,,, | Performed by: STUDENT IN AN ORGANIZED HEALTH CARE EDUCATION/TRAINING PROGRAM

## 2023-07-19 PROCEDURE — 99213 PR OFFICE/OUTPT VISIT, EST, LEVL III, 20-29 MIN: ICD-10-PCS | Mod: ,,, | Performed by: STUDENT IN AN ORGANIZED HEALTH CARE EDUCATION/TRAINING PROGRAM

## 2023-07-19 PROCEDURE — 1159F MED LIST DOCD IN RCRD: CPT | Mod: ,,, | Performed by: STUDENT IN AN ORGANIZED HEALTH CARE EDUCATION/TRAINING PROGRAM

## 2023-07-19 PROCEDURE — 3079F DIAST BP 80-89 MM HG: CPT | Mod: ,,, | Performed by: STUDENT IN AN ORGANIZED HEALTH CARE EDUCATION/TRAINING PROGRAM

## 2023-07-19 PROCEDURE — 3077F SYST BP >= 140 MM HG: CPT | Mod: ,,, | Performed by: STUDENT IN AN ORGANIZED HEALTH CARE EDUCATION/TRAINING PROGRAM

## 2023-07-19 PROCEDURE — 4010F ACE/ARB THERAPY RXD/TAKEN: CPT | Mod: ,,, | Performed by: STUDENT IN AN ORGANIZED HEALTH CARE EDUCATION/TRAINING PROGRAM

## 2023-07-19 PROCEDURE — 1126F AMNT PAIN NOTED NONE PRSNT: CPT | Mod: ,,, | Performed by: STUDENT IN AN ORGANIZED HEALTH CARE EDUCATION/TRAINING PROGRAM

## 2023-07-19 RX ORDER — CYCLOBENZAPRINE HCL 10 MG
10 TABLET ORAL EVERY 8 HOURS
Qty: 270 TABLET | Refills: 0 | Status: CANCELLED | OUTPATIENT
Start: 2023-07-19

## 2023-07-19 RX ORDER — LISINOPRIL AND HYDROCHLOROTHIAZIDE 20; 25 MG/1; MG/1
1 TABLET ORAL DAILY
Qty: 90 TABLET | Refills: 1 | Status: SHIPPED | OUTPATIENT
Start: 2023-07-19 | End: 2024-02-05 | Stop reason: SDUPTHER

## 2023-07-19 RX ORDER — CARVEDILOL 12.5 MG/1
TABLET ORAL
Qty: 270 TABLET | Refills: 1 | Status: SHIPPED | OUTPATIENT
Start: 2023-07-19 | End: 2024-02-05 | Stop reason: SDUPTHER

## 2023-07-19 RX ORDER — ACETAMINOPHEN 500 MG
1 TABLET ORAL DAILY
Qty: 1 EACH | Refills: 0 | Status: SHIPPED | OUTPATIENT
Start: 2023-07-19

## 2023-07-19 RX ORDER — HYDROCODONE BITARTRATE AND ACETAMINOPHEN 10; 325 MG/1; MG/1
1 TABLET ORAL EVERY 8 HOURS PRN
COMMUNITY
Start: 2023-06-27 | End: 2023-07-19 | Stop reason: SDUPTHER

## 2023-07-19 RX ORDER — FUROSEMIDE 40 MG/1
40 TABLET ORAL DAILY
Qty: 90 TABLET | Refills: 1 | Status: SHIPPED | OUTPATIENT
Start: 2023-07-19 | End: 2023-07-20 | Stop reason: DRUGHIGH

## 2023-07-19 NOTE — PROGRESS NOTES
PCP: Loni Dominguez MD    Referring Provider: Loni Dominguez MD    Subjective:   Ade Grant is a 68 y.o. female with hx of hypertension who presents for a follow-up visit.     07/20/2023: Presents for follow-up. Endorses dyspnea on exertion. ROS positive for orthopnea. No leg swelling.    05/01/2023: Complains of chest pain going on for years. Last saw a cardiologist in [place], [place] about 5-6 years ago who has since retired as well. Complains of central chest pain which sometimes feels like heart burn. Also complains of dyspnea on exertion- ongoing for several years, no echo at least in the last 5 years.  Does not recall having had a stress test or a cardiac catheterization in the past.  She is on lasix 40 mg daily- started recently by Dr. Dominguez.    Fhx: Mother had CABG at around 65 also had PAD, older brother had CABG in his 40s     Shx: Active smoker - 1 PPD for 54 pack years     EKG 5/1/23: NSR, 1st degree AV block, LBBB     ECHO   Results for orders placed during the hospital encounter of 05/19/23    Echo    Interpretation Summary  · The left ventricle is normal in size with normal systolic function.  · The estimated ejection fraction is 55%.  · Normal left ventricular diastolic function.  · Atrial fibrillation not observed.  · Normal right ventricular size with normal right ventricular systolic function.  · Moderate-to-severe mitral regurgitation.  · Mild aortic regurgitation.  · Normal central venous pressure (3 mmHg).  · The estimated PA systolic pressure is 24 mmHg.       CATH: No results found for this or any previous visit.      NM MPI 5/19/23:  Impression:     1.  Scintigraphically negative for ischemia or infarct.  2. the global left ventricular systolic function is normal with an LV ejection fraction of 90 % and no evidence of LV dilatation. Wall motion is normal.     Lab Results   Component Value Date     (L) 04/12/2023    K 3.2 (L) 04/12/2023     04/12/2023    CO2 25 04/12/2023    BUN 16  04/12/2023    CREATININE 1.02 04/12/2023    CALCIUM 9.1 04/12/2023    ANIONGAP 11 04/12/2023    EGFRNONAA 74 03/17/2022       Lab Results   Component Value Date    CHOL 84 04/12/2023    CHOL 285 (H) 11/16/2022    CHOL 97 03/17/2022     Lab Results   Component Value Date    HDL 31 (L) 04/12/2023    HDL 31 (L) 11/16/2022    HDL 31 (L) 03/17/2022     Lab Results   Component Value Date    LDLCALC 17 04/12/2023    LDLCALC 196 11/16/2022    LDLCALC 23 03/17/2022     Lab Results   Component Value Date    TRIG 182 (H) 04/12/2023    TRIG 290 (H) 11/16/2022    TRIG 213 (H) 03/17/2022     Lab Results   Component Value Date    CHOLHDL 2.7 04/12/2023    CHOLHDL 9.2 11/16/2022    CHOLHDL 3.1 03/17/2022       Lab Results   Component Value Date    WBC 8.89 03/17/2022    HGB 15.7 03/17/2022    HCT 47.6 (H) 03/17/2022    MCV 90.2 03/17/2022     03/17/2022           Current Outpatient Medications:     blood pressure test kit-large Kit, 1 kit by Misc.(Non-Drug; Combo Route) route once daily., Disp: 1 each, Rfl: 0    carvediloL (COREG) 12.5 MG tablet, Take 2 tablets in the AM and 1 tablet in the PM, Disp: 270 tablet, Rfl: 1    cetirizine (ZYRTEC) 10 mg Cap, Take by mouth., Disp: , Rfl:     citalopram (CELEXA) 40 MG tablet, Take 40 mg by mouth once daily., Disp: , Rfl:     clopidogreL (PLAVIX) 75 mg tablet, Take 1 tablet (75 mg total) by mouth once daily., Disp: 90 tablet, Rfl: 1    cyclobenzaprine (FLEXERIL) 10 MG tablet, TAKE 1 TABLET BY MOUTH EVERY 8 (EIGHT) HOURS., Disp: 270 tablet, Rfl: 0    gabapentin (NEURONTIN) 600 MG tablet, Take 1 tablet (600 mg total) by mouth every 8 (eight) hours., Disp: 90 tablet, Rfl: 2    HYDROcodone-acetaminophen (NORCO)  mg per tablet, Take 1 tablet by mouth every 8 (eight) hours as needed., Disp: , Rfl:     lisinopriL-hydrochlorothiazide (PRINZIDE,ZESTORETIC) 20-25 mg Tab, Take 1 tablet by mouth once daily., Disp: 90 tablet, Rfl: 1    loratadine (CLARITIN) 10 mg tablet, Take 1 tablet (10  "mg total) by mouth once daily., Disp: 90 tablet, Rfl: 1    montelukast (SINGULAIR) 10 mg tablet, Take 1 tablet (10 mg total) by mouth every evening., Disp: 90 tablet, Rfl: 1    potassium chloride SA (K-DUR,KLOR-CON) 20 MEQ tablet, Take 1 tablet (20 mEq total) by mouth once daily., Disp: 90 tablet, Rfl: 1    rosuvastatin (CRESTOR) 40 MG Tab, Take 1 tablet (40 mg total) by mouth nightly., Disp: 90 tablet, Rfl: 1    sertraline (ZOLOFT) 50 MG tablet, Take 1 tablet (50 mg total) by mouth once daily., Disp: 90 tablet, Rfl: 1    furosemide (LASIX) 20 MG tablet, Take 1 tablet (20 mg total) by mouth once daily., Disp: 90 tablet, Rfl: 3    Review of Systems   Constitutional:  Negative for chills, diaphoresis, fever and malaise/fatigue.   Respiratory:  Positive for shortness of breath. Negative for cough.    Cardiovascular:  Positive for orthopnea. Negative for chest pain, palpitations, claudication, leg swelling and PND.   Gastrointestinal:  Negative for abdominal pain, heartburn, nausea and vomiting.   Neurological:  Negative for dizziness.       Objective:   BP (!) 130/90 (BP Location: Left arm, Patient Position: Sitting)   Pulse 80   Ht 5' 3" (1.6 m)   Wt 77.6 kg (171 lb)   SpO2 97%   BMI 30.29 kg/m²     Physical Exam  Constitutional:       General: She is not in acute distress.     Appearance: Normal appearance.   Cardiovascular:      Rate and Rhythm: Normal rate and regular rhythm.      Pulses: Normal pulses.      Heart sounds: Normal heart sounds. No murmur heard.    No friction rub. No gallop.   Pulmonary:      Effort: Pulmonary effort is normal.      Breath sounds: Normal breath sounds. No wheezing or rales.   Musculoskeletal:      Right lower leg: No edema.      Left lower leg: No edema.   Skin:     General: Skin is warm and dry.   Neurological:      Mental Status: She is alert.         Assessment:     1. Mitral valve insufficiency and aortic valve insufficiency  Basic Metabolic Panel    Transesophageal echo " (SUSAN) with possible cardioversion              Plan:   No problem-specific Assessment & Plan notes found for this encounter.    Dyspnea on exertion  Moderate-to-severe MR on TTE  Decrease lasix to 20 mg daily   Check BMP today  Schedule SUSAN for further evaluation of etiology and severity of mitral regurgitation.   Normal LVEF and normal LV size.    Chest pain  Risk factors: DM, HTN, former smoking, FH  Baseline EKG: LBBB (chronicity is unclear)  Pharmacologic stress test with nuclear imaging with no evidence of ischemia or infarct.       Follow-up in 6 weeks.    .

## 2023-07-19 NOTE — PROGRESS NOTES
Subjective:         Patient ID: Ade Grant is a 68 y.o. female.    Chief Complaint: Low-back Pain and Leg Pain (bilateral)      Pain  This is a chronic problem. The current episode started more than 1 year ago. The problem occurs daily. The problem has been unchanged. Associated symptoms include arthralgias, myalgias and neck pain. Pertinent negatives include no anorexia, chest pain, chills, coughing, diaphoresis, fever, sore throat, urinary symptoms, vertigo, visual change or vomiting.   Review of Systems   Constitutional:  Negative for activity change, appetite change, chills, diaphoresis, fever and unexpected weight change.   HENT:  Negative for drooling, ear discharge, ear pain, facial swelling, mouth sores, nosebleeds, sore throat, trouble swallowing, voice change and goiter.    Eyes:  Negative for photophobia, pain, discharge, redness and visual disturbance.   Respiratory:  Negative for apnea, cough, choking, chest tightness, shortness of breath, wheezing and stridor.    Cardiovascular:  Negative for chest pain, palpitations and leg swelling.   Gastrointestinal:  Negative for abdominal distention, anorexia, diarrhea, rectal pain, vomiting and fecal incontinence.   Endocrine: Negative for cold intolerance, heat intolerance, polydipsia, polyphagia and polyuria.   Genitourinary:  Negative for bladder incontinence, dysuria, flank pain, frequency and hot flashes.   Musculoskeletal:  Positive for arthralgias, back pain, leg pain, myalgias and neck pain.   Integumentary:  Negative for color change and pallor.   Allergic/Immunologic: Negative for immunocompromised state.   Neurological:  Negative for dizziness, vertigo, seizures, syncope, facial asymmetry, speech difficulty, light-headedness, coordination difficulties, memory loss and coordination difficulties.   Hematological:  Negative for adenopathy. Does not bruise/bleed easily.   Psychiatric/Behavioral:  Negative for agitation, behavioral problems,  "confusion, decreased concentration, dysphoric mood, hallucinations, self-injury and suicidal ideas. The patient is not nervous/anxious and is not hyperactive.          Past Medical History:   Diagnosis Date    Anxiety     Cervical radiculopathy     Chronic pain syndrome     COPD (chronic obstructive pulmonary disease)     Depression     GERD (gastroesophageal reflux disease)     Hyperlipidemia     Hypertension     Lumbosacral radiculopathy     Obstructive sleep apnea     Other long term (current) drug therapy     Presence of right artificial knee joint     Vitamin D deficiency      Past Surgical History:   Procedure Laterality Date    ARTHROSCOPY OF HIP Left     CAUDAL EPIDURAL STEROID INJECTION N/A 06/04/2018 2/5/2018    Dr Hawkins    HYSTERECTOMY      INJECTION OF ANESTHETIC AGENT INTO SACROILIAC JOINT Bilateral 12/15/2022    Procedure: BLOCK, SACROILIAC JOINT   BILATERAL;  Surgeon: Riya Armas MD;  Location: [facility];  Service: Pain Management;  Laterality: Bilateral;    KNEE ARTHROSCOPY Right     LUMBAR FUSION       Social History     Socioeconomic History    Marital status:     Number of children: 3   Tobacco Use    Smoking status: Every Day     Packs/day: 1.00     Types: Cigarettes    Smokeless tobacco: Never   Substance and Sexual Activity    Alcohol use: Not Currently    Drug use: Yes     Types: Hydrocodone    Sexual activity: Not Currently     Family History   Problem Relation Age of Onset    Cancer Mother     Heart disease Mother     Hypertension Mother     Heart disease Father     Thyroid cancer Brother     Bone cancer Brother     Colon cancer Brother     Thyroid cancer Other     Cancer Other      Review of patient's allergies indicates:   Allergen Reactions    Fenofibrate nanocrystallized Rash        Objective:  Vitals:    07/26/23 1313   BP: 115/69   Pulse: 91   Resp: 16   Weight: 76.2 kg (168 lb)   Height: 5' 3" (1.6 m)   PainSc:   5         Physical Exam  Vitals and nursing note " reviewed. Exam conducted with a chaperone present.   Constitutional:       General: She is awake. She is not in acute distress.     Appearance: Normal appearance. She is not ill-appearing or toxic-appearing.   HENT:      Head: Normocephalic and atraumatic.      Nose: Nose normal.      Mouth/Throat:      Mouth: Mucous membranes are moist.      Pharynx: Oropharynx is clear.   Eyes:      Conjunctiva/sclera: Conjunctivae normal.      Pupils: Pupils are equal, round, and reactive to light.   Cardiovascular:      Rate and Rhythm: Normal rate.   Pulmonary:      Effort: Pulmonary effort is normal. No respiratory distress.   Abdominal:      Palpations: Abdomen is soft.      Tenderness: There is no guarding.   Musculoskeletal:         General: Normal range of motion.      Cervical back: Normal range of motion and neck supple. Tenderness present. No rigidity.      Thoracic back: Tenderness present.      Lumbar back: Tenderness present.   Skin:     General: Skin is warm and dry.      Coloration: Skin is not jaundiced or pale.   Neurological:      General: No focal deficit present.      Mental Status: She is alert and oriented to person, place, and time. Mental status is at baseline.      Cranial Nerves: No cranial nerve deficit (II-XII).   Psychiatric:         Mood and Affect: Mood normal.         Behavior: Behavior normal. Behavior is cooperative.         Thought Content: Thought content normal.         Echo  · The left ventricle is normal in size with normal systolic function.  · The estimated ejection fraction is 55%.  · Normal left ventricular diastolic function.  · Atrial fibrillation not observed.  · Normal right ventricular size with normal right ventricular systolic   function.  · Moderate-to-severe mitral regurgitation.  · Mild aortic regurgitation.  · Normal central venous pressure (3 mmHg).  · The estimated PA systolic pressure is 24 mmHg.          Lab Visit on 07/20/2023   Component Date Value Ref Range Status     Sodium 07/20/2023 137  136 - 145 mmol/L Final    Potassium 07/20/2023 3.9  3.5 - 5.1 mmol/L Final    Chloride 07/20/2023 104  98 - 107 mmol/L Final    CO2 07/20/2023 29  21 - 32 mmol/L Final    Anion Gap 07/20/2023 8  7 - 16 mmol/L Final    Glucose 07/20/2023 118 (H)  74 - 106 mg/dL Final    BUN 07/20/2023 15  7 - 18 mg/dL Final    Creatinine 07/20/2023 0.72  0.55 - 1.02 mg/dL Final    BUN/Creatinine Ratio 07/20/2023 21 (H)  6 - 20 Final    Calcium 07/20/2023 9.4  8.5 - 10.1 mg/dL Final    eGFR 07/20/2023 92  >=60 mL/min/1.73m2 Final   Hospital Outpatient Visit on 05/19/2023   Component Date Value Ref Range Status    85% Max Predicted HR 05/19/2023 126   Final    Max Predicted HR 05/19/2023 148   Final    [facility] CV CPX PATIENT IS MALE 05/19/2023 0.0   Final    [facility] CV CPX PATIENT IS FEMALE 05/19/2023 1.0   Final    Systolic blood pressure 05/19/2023 91  mmHg Final    Diastolic blood pressure 05/19/2023 59  mmHg Final    HR at rest 05/19/2023 75  bpm Final    Peak Systolic BP 05/19/2023 92  mmHg Final    Peak Diatolic BP 05/19/2023 74  mmHg Final    Peak HR 05/19/2023 78  bpm Final    % Max HR Achieved 05/19/2023 53   Final    1 Minute Recovery HR 05/19/2023 74  bpm Final    RPP 05/19/2023 6,825   Final    Peak RPP 05/19/2023 7,176   Final   Hospital Outpatient Visit on 05/19/2023   Component Date Value Ref Range Status    BSA 05/19/2023 1.87  m2 Final    AORTIC VALVE CUSP SEPERATION 05/19/2023 1.89  cm Final    LVIDd 05/19/2023 3.10 (A)  3.5 - 6.0 cm Final    IVS 05/19/2023 0.95  0.6 - 1.1 cm Final    Posterior Wall 05/19/2023 0.59 (A)  0.6 - 1.1 cm Final    LVIDs 05/19/2023 2.52  2.1 - 4.0 cm Final    FS 05/19/2023 19  28 - 44 % Final    LV mass 05/19/2023 58.93  g Final    LA size 05/19/2023 4.10  cm Final    RVDD 05/19/2023 2.20  cm Final    TAPSE 05/19/2023 1.70  cm Final    RA area 05/19/2023 7.3  cm2 Final    Left Ventricle Relative Wall Thick* 05/19/2023 0.38  cm Final    AV mean gradient 05/19/2023 2  mmHg Final     E wave deceleration time 05/19/2023 300.00  msec Final    LVOT diameter 05/19/2023 1.80  cm Final    LVOT area 05/19/2023 2.5  cm2 Final    Ao peak valerio 05/19/2023 3.1  m/s Final    Ao VTI 05/19/2023 62.83  cm Final    AV peak gradient 05/19/2023 38  mmHg Final    MV Peak E Valerio 05/19/2023 0.63  m/s Final    TR Max Valerio 05/19/2023 2.28  m/s Final    LV Systolic Volume 05/19/2023 22.77  mL Final    LV Systolic Volume Index 05/19/2023 12.5  mL/m2 Final    LV Diastolic Volume 05/19/2023 37.92  mL Final    LV Diastolic Volume Index 05/19/2023 20.84  mL/m2 Final    LV Mass Index 05/19/2023 32  g/m2 Final    Triscuspid Valve Regurgitation Pea* 05/19/2023 21  mmHg Final    Right Atrial Pressure (from IVC) 05/19/2023 3  mmHg Final    EF 05/19/2023 55  % Final    TV rest pulmonary artery pressure 05/19/2023 24  mmHg Final   Office Visit on 05/11/2023   Component Date Value Ref Range Status    ProBNP 05/11/2023 75  1 - 125 pg/mL Final   Office Visit on 04/26/2023   Component Date Value Ref Range Status    POC Amphetamines 04/26/2023 Negative  Negative, Inconclusive Final    POC Barbiturates 04/26/2023 Negative  Negative, Inconclusive Final    POC Benzodiazepines 04/26/2023 Negative  Negative, Inconclusive Final    POC Cocaine 04/26/2023 Negative  Negative, Inconclusive Final    POC THC 04/26/2023 Negative  Negative, Inconclusive Final    POC Methadone 04/26/2023 Negative  Negative, Inconclusive Final    POC Methamphetamine 04/26/2023 Negative  Negative, Inconclusive Final    POC Opiates 04/26/2023 Negative  Negative, Inconclusive Final    POC Oxycodone 04/26/2023 Negative  Negative, Inconclusive Final    POC Phencyclidine 04/26/2023 Negative  Negative, Inconclusive Final    POC Methylenedioxymethamphetamine * 04/26/2023 Negative  Negative, Inconclusive Final    POC Tricyclic Antidepressants 04/26/2023 Negative  Negative, Inconclusive Final    POC Buprenorphine 04/26/2023 Negative   Final     Acceptable  04/26/2023 Yes   Final    POC Temperature (Urine) 04/26/2023 92   Final    pH, UA 04/26/2023 8.0  5.0 to 8.0 pH Units Final    Creatinine, Urine 04/26/2023 34  28 - 219 mg/dL Final    6-Acetylmorphine 04/26/2023 Negative  10 ng/mL Final    7-Aminoclonazepam 04/26/2023 Negative  Negative 25 ng/mL Final    a-Hydroxyalprazolam 04/26/2023 Negative  Negative 25 ng/mL Final    Acetyl Fentanyl 04/26/2023 Negative  2.5 ng/mL Final    Acetyl Norfentanyl Oxalate 04/26/2023 Negative  5 ng/mL Final    Benzoylecgonine 04/26/2023 Negative  100 ng/mL Final    Buprenorphine 04/26/2023 Negative  25 ng/mL Final    Codeine 04/26/2023 Negative  25 ng/mL Final    EDDP 04/26/2023 Negative  25 ng/mL Final    Fentanyl 04/26/2023 Negative  2.5 ng/mL Final    Hydrocodone 04/26/2023 120.1 (H)  <25.0 25 ng/mL Final    Hydromorphone 04/26/2023 Negative  25 ng/mL Final    Lorazepam 04/26/2023 Negative  25 ng/mL Final    Morphine 04/26/2023 Negative  25 ng/mL Final    Norbuprenorphine 04/26/2023 Negative  25 ng/mL Final    Nordiazepam 04/26/2023 Negative  25 ng/mL Final    Norfentanyl Oxalate 04/26/2023 Negative  5 ng/mL Final    Norhydrocodone 04/26/2023 499.4 (H)  <50.0 50 ng/mL Final    Noroxycodone HCL 04/26/2023 Negative  50 ng/mL Final    Oxazepam 04/26/2023 Negative  25 ng/mL Final    Oxymorphone 04/26/2023 Negative  25 ng/mL Final    Tapentadol 04/26/2023 Negative  25 ng/mL Final    Temazepam 04/26/2023 Negative  25 ng/mL Final    THC-COOH 04/26/2023 Negative  25 ng/mL Final    Tramadol 04/26/2023 Negative  100 ng/mL Final    Amphetamine, Urine 04/26/2023 Negative  Negative Final    Methamphetamines, Urine 04/26/2023 Negative  Negative Final    Methadone, Urine 04/26/2023 Negative  Negative 25 ng/mL Final    Oxycodone, Urine 04/26/2023 Negative  Negative 25 ng/mL Final    Specific Gravity, UA 04/26/2023 1.009  <=1.030 Final   Office Visit on 04/12/2023   Component Date Value Ref Range Status    Sodium 04/12/2023 134 (L)  136 - 145 mmol/L  Final    Potassium 04/12/2023 3.2 (L)  3.5 - 5.1 mmol/L Final    Chloride 04/12/2023 101  98 - 107 mmol/L Final    CO2 04/12/2023 25  21 - 32 mmol/L Final    Anion Gap 04/12/2023 11  7 - 16 mmol/L Final    Glucose 04/12/2023 117 (H)  74 - 106 mg/dL Final    BUN 04/12/2023 16  7 - 18 mg/dL Final    Creatinine 04/12/2023 1.02  0.55 - 1.02 mg/dL Final    BUN/Creatinine Ratio 04/12/2023 16  6 - 20 Final    Calcium 04/12/2023 9.1  8.5 - 10.1 mg/dL Final    eGFR 04/12/2023 61  >=60 mL/min/1.73m² Final    Triglycerides 04/12/2023 182 (H)  35 - 150 mg/dL Final    Cholesterol 04/12/2023 84  0 - 200 mg/dL Final    HDL Cholesterol 04/12/2023 31 (L)  40 - 60 mg/dL Final    Cholesterol/HDL Ratio (Risk Factor) 04/12/2023 2.7   Final    Non-HDL 04/12/2023 53  mg/dL Final    LDL Calculated 04/12/2023 17  mg/dL Final    VLDL 04/12/2023 36  mg/dL Final         No orders of the defined types were placed in this encounter.      Requested Prescriptions     Signed Prescriptions Disp Refills    gabapentin (NEURONTIN) 600 MG tablet 90 tablet 2     Sig: Take 1 tablet (600 mg total) by mouth every 8 (eight) hours.    HYDROcodone-acetaminophen (NORCO)  mg per tablet 90 tablet 0     Sig: Take 1 tablet by mouth every 8 (eight) hours as needed for Pain.    HYDROcodone-acetaminophen (NORCO)  mg per tablet 90 tablet 0     Sig: Take 1 tablet by mouth every 8 (eight) hours as needed for Pain.    HYDROcodone-acetaminophen (NORCO)  mg per tablet 90 tablet 0     Sig: Take 1 tablet by mouth every 8 (eight) hours as needed for Pain.       Assessment:     1. Lumbosacral radiculopathy    2. Back muscle spasm    3. Cervical radiculopathy         A's of Opioid Risk Assessment  Activity:Patient can perform ADL.   Analgesia:Patients pain is partially controlled by current medication. Patient has tried OTC medications such as Tylenol and Ibuprofen with out relief.   Adverse Effects: Patient denies constipation or sedation.  Aberrant Behavior:   reviewed with no aberrant drug seeking/taking behavior.  Overdose reversal drug naloxone discussed    Drug screen reviewed      Plan:    Narcan January 2023     Presumptive drug screen July 26, 2023 consistent with opiates    Definitive drug screen April 26, 2023 hydrocodone, nor hydrocodone  No hydromorphone    Patient verbalized understanding any further inconsistent drug screens will discontinue narcotics    History lumbar surgery x2 L4 through S1 lumbar fusion Dr. Villeda    She had bilateral sacroiliac injection # 1 December 15, 2022, patient states she had 90% relief after procedure, procedure did help improve her level of function     Currently under evaluation Cardiology    She is considering having hardware removed from her left thigh/hip     She states current medications helping control her discomfort    She would like to continue conservative management     Continue current medication    Follow-up 3 months     Dr. Armas, December 2023    Bring original prescription medication bottles/container/box with labels to each visit

## 2023-07-19 NOTE — PROGRESS NOTES
Subjective:      Ade Grant is a 68 y.o.female who presents to clinic for Hypertension    Hypertension:    BP Readings from Last 3 Encounters:   07/19/23 (!) 142/80   07/12/23 (!) 158/92   05/19/23 (!) 91/59      Current medications: coreg 12.5mg twice daily, lasix 40mg daily, prinzide 20-25mg daily  Blood pressures at home: doesn't check it  Diet:  trying to watch diet  Exercise: minimal    Lab Results   Component Value Date     (L) 04/12/2023    K 3.2 (L) 04/12/2023    CREATININE 1.02 04/12/2023       Review of Systems   Eyes:  Negative for blurred vision and double vision.   Respiratory:  Negative for shortness of breath.    Cardiovascular:  Negative for chest pain, palpitations and leg swelling.   Neurological:  Positive for headaches. Negative for dizziness.      Past Medical History:  has a past medical history of Anxiety, Cervical radiculopathy, Chronic pain syndrome, COPD (chronic obstructive pulmonary disease), Depression, GERD (gastroesophageal reflux disease), Hyperlipidemia, Hypertension, Lumbosacral radiculopathy, Obstructive sleep apnea, Other long term (current) drug therapy, Presence of right artificial knee joint, and Vitamin D deficiency.   Past Surgical History:  has a past surgical history that includes Arthroscopy of hip (Left); Lumbar fusion; Hysterectomy; Knee arthroscopy (Right); Caudal epidural steroid injection (N/A, 06/04/2018 2/5/2018); and Injection of anesthetic agent into sacroiliac joint (Bilateral, 12/15/2022).  Family History: family history includes Bone cancer in her brother; Cancer in her mother and another family member; Colon cancer in her brother; Heart disease in her father and mother; Hypertension in her mother; Thyroid cancer in her brother and another family member.  Social History:  reports that she has been smoking cigarettes. She has been smoking an average of 1 pack per day. She has never used smokeless tobacco. She reports that she does not currently use  alcohol. She reports current drug use. Drug: Hydrocodone.  Allergies:   Review of patient's allergies indicates:   Allergen Reactions    Fenofibrate nanocrystallized Rash       Objective:     Vitals:    07/19/23 1315   BP: (!) 160/90   Pulse:    Resp:    Temp:      Physical Exam  Vitals reviewed.   Constitutional:       General: She is not in acute distress.     Appearance: Normal appearance. She is not ill-appearing.   HENT:      Head: Normocephalic and atraumatic.      Right Ear: External ear normal.      Left Ear: External ear normal.   Eyes:      General: No scleral icterus.        Right eye: No discharge.         Left eye: No discharge.      Conjunctiva/sclera: Conjunctivae normal.   Cardiovascular:      Rate and Rhythm: Normal rate and regular rhythm.      Pulses: Normal pulses.   Pulmonary:      Effort: Pulmonary effort is normal. No respiratory distress.      Breath sounds: Normal breath sounds. No wheezing.   Musculoskeletal:      Right lower leg: No edema.      Left lower leg: No edema.   Neurological:      General: No focal deficit present.      Mental Status: She is alert.   Psychiatric:         Behavior: Behavior normal.          Assessment/Plan:     1. Uncontrolled hypertension  - remains uncontrolled despite taking meds today; increase coreg to 2 tabs AM and 1 tab PM - hesitant to do 2 tabs twice daily due to hypotension on that regimen a few months ago  - lisinopriL-hydrochlorothiazide (PRINZIDE,ZESTORETIC) 20-25 mg Tab; Take 1 tablet by mouth once daily.  Dispense: 90 tablet; Refill: 1  - furosemide (LASIX) 40 MG tablet; Take 1 tablet (40 mg total) by mouth once daily.  Dispense: 90 tablet; Refill: 1  - carvediloL (COREG) 12.5 MG tablet; Take 2 tablets in the AM and 1 tablet in the PM  Dispense: 270 tablet; Refill: 1  - blood pressure test kit-large Kit; 1 kit by Misc.(Non-Drug; Combo Route) route once daily.  Dispense: 1 each; Refill: 0      Return to clinic in 1 month for f/u HTN or sooner if  needed.     Loni Dominguez MD  Family Medicine  07/19/2023

## 2023-07-20 ENCOUNTER — OFFICE VISIT (OUTPATIENT)
Dept: CARDIOLOGY | Facility: CLINIC | Age: 68
End: 2023-07-20
Payer: MEDICARE

## 2023-07-20 VITALS
HEART RATE: 80 BPM | SYSTOLIC BLOOD PRESSURE: 130 MMHG | DIASTOLIC BLOOD PRESSURE: 90 MMHG | WEIGHT: 171 LBS | BODY MASS INDEX: 30.3 KG/M2 | HEIGHT: 63 IN | OXYGEN SATURATION: 97 %

## 2023-07-20 DIAGNOSIS — I34.0 MODERATE TO SEVERE MITRAL REGURGITATION: Primary | ICD-10-CM

## 2023-07-20 DIAGNOSIS — I08.0 MITRAL VALVE INSUFFICIENCY AND AORTIC VALVE INSUFFICIENCY: Primary | ICD-10-CM

## 2023-07-20 PROCEDURE — 99214 OFFICE O/P EST MOD 30 MIN: CPT | Mod: PBBFAC | Performed by: STUDENT IN AN ORGANIZED HEALTH CARE EDUCATION/TRAINING PROGRAM

## 2023-07-20 PROCEDURE — 1159F MED LIST DOCD IN RCRD: CPT | Mod: CPTII,,, | Performed by: STUDENT IN AN ORGANIZED HEALTH CARE EDUCATION/TRAINING PROGRAM

## 2023-07-20 PROCEDURE — 4010F ACE/ARB THERAPY RXD/TAKEN: CPT | Mod: CPTII,,, | Performed by: STUDENT IN AN ORGANIZED HEALTH CARE EDUCATION/TRAINING PROGRAM

## 2023-07-20 PROCEDURE — 99215 PR OFFICE/OUTPT VISIT, EST, LEVL V, 40-54 MIN: ICD-10-PCS | Mod: S$PBB,,, | Performed by: STUDENT IN AN ORGANIZED HEALTH CARE EDUCATION/TRAINING PROGRAM

## 2023-07-20 PROCEDURE — 99215 OFFICE O/P EST HI 40 MIN: CPT | Mod: S$PBB,,, | Performed by: STUDENT IN AN ORGANIZED HEALTH CARE EDUCATION/TRAINING PROGRAM

## 2023-07-20 PROCEDURE — 4010F PR ACE/ARB THEARPY RXD/TAKEN: ICD-10-PCS | Mod: CPTII,,, | Performed by: STUDENT IN AN ORGANIZED HEALTH CARE EDUCATION/TRAINING PROGRAM

## 2023-07-20 PROCEDURE — 3080F PR MOST RECENT DIASTOLIC BLOOD PRESSURE >= 90 MM HG: ICD-10-PCS | Mod: CPTII,,, | Performed by: STUDENT IN AN ORGANIZED HEALTH CARE EDUCATION/TRAINING PROGRAM

## 2023-07-20 PROCEDURE — 3080F DIAST BP >= 90 MM HG: CPT | Mod: CPTII,,, | Performed by: STUDENT IN AN ORGANIZED HEALTH CARE EDUCATION/TRAINING PROGRAM

## 2023-07-20 PROCEDURE — 3075F PR MOST RECENT SYSTOLIC BLOOD PRESS GE 130-139MM HG: ICD-10-PCS | Mod: CPTII,,, | Performed by: STUDENT IN AN ORGANIZED HEALTH CARE EDUCATION/TRAINING PROGRAM

## 2023-07-20 PROCEDURE — 3008F BODY MASS INDEX DOCD: CPT | Mod: CPTII,,, | Performed by: STUDENT IN AN ORGANIZED HEALTH CARE EDUCATION/TRAINING PROGRAM

## 2023-07-20 PROCEDURE — 3075F SYST BP GE 130 - 139MM HG: CPT | Mod: CPTII,,, | Performed by: STUDENT IN AN ORGANIZED HEALTH CARE EDUCATION/TRAINING PROGRAM

## 2023-07-20 PROCEDURE — 1159F PR MEDICATION LIST DOCUMENTED IN MEDICAL RECORD: ICD-10-PCS | Mod: CPTII,,, | Performed by: STUDENT IN AN ORGANIZED HEALTH CARE EDUCATION/TRAINING PROGRAM

## 2023-07-20 PROCEDURE — 3008F PR BODY MASS INDEX (BMI) DOCUMENTED: ICD-10-PCS | Mod: CPTII,,, | Performed by: STUDENT IN AN ORGANIZED HEALTH CARE EDUCATION/TRAINING PROGRAM

## 2023-07-20 RX ORDER — FUROSEMIDE 20 MG/1
20 TABLET ORAL DAILY
Qty: 90 TABLET | Refills: 3 | Status: SHIPPED | OUTPATIENT
Start: 2023-07-20 | End: 2024-02-20 | Stop reason: SDUPTHER

## 2023-07-20 NOTE — PATIENT INSTRUCTIONS
Decrease Lasix to 20 mg daily   Labs today   SUSAN scheduled for -August 23,2023   Follow-up in 6 weeks

## 2023-07-26 ENCOUNTER — OFFICE VISIT (OUTPATIENT)
Dept: PAIN MEDICINE | Facility: CLINIC | Age: 68
End: 2023-07-26
Payer: MEDICARE

## 2023-07-26 VITALS
HEIGHT: 63 IN | WEIGHT: 168 LBS | HEART RATE: 91 BPM | BODY MASS INDEX: 29.77 KG/M2 | DIASTOLIC BLOOD PRESSURE: 69 MMHG | RESPIRATION RATE: 16 BRPM | SYSTOLIC BLOOD PRESSURE: 115 MMHG

## 2023-07-26 DIAGNOSIS — M54.17 LUMBOSACRAL RADICULOPATHY: Primary | Chronic | ICD-10-CM

## 2023-07-26 DIAGNOSIS — M62.830 BACK MUSCLE SPASM: ICD-10-CM

## 2023-07-26 DIAGNOSIS — Z79.899 ENCOUNTER FOR LONG-TERM (CURRENT) USE OF OTHER MEDICATIONS: ICD-10-CM

## 2023-07-26 DIAGNOSIS — M54.12 CERVICAL RADICULOPATHY: Chronic | ICD-10-CM

## 2023-07-26 PROCEDURE — 3288F PR FALLS RISK ASSESSMENT DOCUMENTED: ICD-10-PCS | Mod: CPTII,,, | Performed by: PHYSICIAN ASSISTANT

## 2023-07-26 PROCEDURE — 80305 DRUG TEST PRSMV DIR OPT OBS: CPT | Mod: PBBFAC | Performed by: PHYSICIAN ASSISTANT

## 2023-07-26 PROCEDURE — 3078F DIAST BP <80 MM HG: CPT | Mod: CPTII,,, | Performed by: PHYSICIAN ASSISTANT

## 2023-07-26 PROCEDURE — 3078F PR MOST RECENT DIASTOLIC BLOOD PRESSURE < 80 MM HG: ICD-10-PCS | Mod: CPTII,,, | Performed by: PHYSICIAN ASSISTANT

## 2023-07-26 PROCEDURE — 1101F PR PT FALLS ASSESS DOC 0-1 FALLS W/OUT INJ PAST YR: ICD-10-PCS | Mod: CPTII,,, | Performed by: PHYSICIAN ASSISTANT

## 2023-07-26 PROCEDURE — 1159F MED LIST DOCD IN RCRD: CPT | Mod: CPTII,,, | Performed by: PHYSICIAN ASSISTANT

## 2023-07-26 PROCEDURE — 4010F ACE/ARB THERAPY RXD/TAKEN: CPT | Mod: CPTII,,, | Performed by: PHYSICIAN ASSISTANT

## 2023-07-26 PROCEDURE — 1125F AMNT PAIN NOTED PAIN PRSNT: CPT | Mod: CPTII,,, | Performed by: PHYSICIAN ASSISTANT

## 2023-07-26 PROCEDURE — 3074F SYST BP LT 130 MM HG: CPT | Mod: CPTII,,, | Performed by: PHYSICIAN ASSISTANT

## 2023-07-26 PROCEDURE — 99214 OFFICE O/P EST MOD 30 MIN: CPT | Mod: PBBFAC | Performed by: PHYSICIAN ASSISTANT

## 2023-07-26 PROCEDURE — 99214 PR OFFICE/OUTPT VISIT, EST, LEVL IV, 30-39 MIN: ICD-10-PCS | Mod: S$PBB,,, | Performed by: PHYSICIAN ASSISTANT

## 2023-07-26 PROCEDURE — 3008F PR BODY MASS INDEX (BMI) DOCUMENTED: ICD-10-PCS | Mod: CPTII,,, | Performed by: PHYSICIAN ASSISTANT

## 2023-07-26 PROCEDURE — 3074F PR MOST RECENT SYSTOLIC BLOOD PRESSURE < 130 MM HG: ICD-10-PCS | Mod: CPTII,,, | Performed by: PHYSICIAN ASSISTANT

## 2023-07-26 PROCEDURE — 99214 OFFICE O/P EST MOD 30 MIN: CPT | Mod: S$PBB,,, | Performed by: PHYSICIAN ASSISTANT

## 2023-07-26 PROCEDURE — 3288F FALL RISK ASSESSMENT DOCD: CPT | Mod: CPTII,,, | Performed by: PHYSICIAN ASSISTANT

## 2023-07-26 PROCEDURE — 4010F PR ACE/ARB THEARPY RXD/TAKEN: ICD-10-PCS | Mod: CPTII,,, | Performed by: PHYSICIAN ASSISTANT

## 2023-07-26 PROCEDURE — 1101F PT FALLS ASSESS-DOCD LE1/YR: CPT | Mod: CPTII,,, | Performed by: PHYSICIAN ASSISTANT

## 2023-07-26 PROCEDURE — 1159F PR MEDICATION LIST DOCUMENTED IN MEDICAL RECORD: ICD-10-PCS | Mod: CPTII,,, | Performed by: PHYSICIAN ASSISTANT

## 2023-07-26 PROCEDURE — 3008F BODY MASS INDEX DOCD: CPT | Mod: CPTII,,, | Performed by: PHYSICIAN ASSISTANT

## 2023-07-26 PROCEDURE — 1125F PR PAIN SEVERITY QUANTIFIED, PAIN PRESENT: ICD-10-PCS | Mod: CPTII,,, | Performed by: PHYSICIAN ASSISTANT

## 2023-07-26 RX ORDER — HYDROCODONE BITARTRATE AND ACETAMINOPHEN 10; 325 MG/1; MG/1
1 TABLET ORAL EVERY 8 HOURS PRN
Qty: 90 TABLET | Refills: 0 | Status: SHIPPED | OUTPATIENT
Start: 2023-09-24 | End: 2023-09-26 | Stop reason: SDUPTHER

## 2023-07-26 RX ORDER — GABAPENTIN 600 MG/1
600 TABLET ORAL EVERY 8 HOURS
Qty: 90 TABLET | Refills: 2 | Status: SHIPPED | OUTPATIENT
Start: 2023-07-26 | End: 2023-10-18 | Stop reason: SDUPTHER

## 2023-07-26 RX ORDER — HYDROCODONE BITARTRATE AND ACETAMINOPHEN 10; 325 MG/1; MG/1
1 TABLET ORAL EVERY 8 HOURS PRN
Qty: 90 TABLET | Refills: 0 | Status: SHIPPED | OUTPATIENT
Start: 2023-08-25 | End: 2023-10-18 | Stop reason: SDUPTHER

## 2023-07-26 RX ORDER — HYDROCODONE BITARTRATE AND ACETAMINOPHEN 10; 325 MG/1; MG/1
1 TABLET ORAL EVERY 8 HOURS PRN
Qty: 90 TABLET | Refills: 0 | Status: SHIPPED | OUTPATIENT
Start: 2023-07-26 | End: 2023-10-18 | Stop reason: SDUPTHER

## 2023-08-23 ENCOUNTER — HOSPITAL ENCOUNTER (OUTPATIENT)
Facility: HOSPITAL | Age: 68
Discharge: HOME | End: 2023-08-28
Attending: STUDENT IN AN ORGANIZED HEALTH CARE EDUCATION/TRAINING PROGRAM | Admitting: STUDENT IN AN ORGANIZED HEALTH CARE EDUCATION/TRAINING PROGRAM
Payer: MEDICARE

## 2023-08-23 ENCOUNTER — HOSPITAL ENCOUNTER (OUTPATIENT)
Dept: CARDIOLOGY | Facility: HOSPITAL | Age: 68
Discharge: HOME | End: 2023-08-23
Attending: STUDENT IN AN ORGANIZED HEALTH CARE EDUCATION/TRAINING PROGRAM
Payer: MEDICARE

## 2023-08-23 VITALS
TEMPERATURE: 98 F | DIASTOLIC BLOOD PRESSURE: 63 MMHG | HEIGHT: 63 IN | RESPIRATION RATE: 18 BRPM | BODY MASS INDEX: 30.3 KG/M2 | OXYGEN SATURATION: 94 % | WEIGHT: 171 LBS | HEART RATE: 64 BPM | SYSTOLIC BLOOD PRESSURE: 98 MMHG

## 2023-08-23 VITALS — HEIGHT: 63 IN | WEIGHT: 171 LBS | BODY MASS INDEX: 30.3 KG/M2

## 2023-08-23 DIAGNOSIS — I34.0 MODERATE TO SEVERE MITRAL REGURGITATION: ICD-10-CM

## 2023-08-23 DIAGNOSIS — I34.0 MODERATE MITRAL REGURGITATION: Primary | ICD-10-CM

## 2023-08-23 DIAGNOSIS — I08.0 MITRAL VALVE INSUFFICIENCY AND AORTIC VALVE INSUFFICIENCY: ICD-10-CM

## 2023-08-23 PROCEDURE — 99153 MOD SED SAME PHYS/QHP EA: CPT | Performed by: STUDENT IN AN ORGANIZED HEALTH CARE EDUCATION/TRAINING PROGRAM

## 2023-08-23 PROCEDURE — 93320 DOPPLER ECHO COMPLETE: CPT | Mod: 26,,, | Performed by: STUDENT IN AN ORGANIZED HEALTH CARE EDUCATION/TRAINING PROGRAM

## 2023-08-23 PROCEDURE — 93010 ELECTROCARDIOGRAM REPORT: CPT | Mod: ,,, | Performed by: STUDENT IN AN ORGANIZED HEALTH CARE EDUCATION/TRAINING PROGRAM

## 2023-08-23 PROCEDURE — 93312 TRANSESOPHAGEAL ECHO (TEE) W/ POSSIBLE CARDIOVERSION: ICD-10-PCS | Mod: 26,,, | Performed by: STUDENT IN AN ORGANIZED HEALTH CARE EDUCATION/TRAINING PROGRAM

## 2023-08-23 PROCEDURE — 99238 HOSP IP/OBS DSCHRG MGMT 30/<: CPT | Mod: ,,, | Performed by: STUDENT IN AN ORGANIZED HEALTH CARE EDUCATION/TRAINING PROGRAM

## 2023-08-23 PROCEDURE — 93325 DOPPLER ECHO COLOR FLOW MAPG: CPT

## 2023-08-23 PROCEDURE — 25000003 PHARM REV CODE 250: Performed by: STUDENT IN AN ORGANIZED HEALTH CARE EDUCATION/TRAINING PROGRAM

## 2023-08-23 PROCEDURE — 99152 MOD SED SAME PHYS/QHP 5/>YRS: CPT | Performed by: STUDENT IN AN ORGANIZED HEALTH CARE EDUCATION/TRAINING PROGRAM

## 2023-08-23 PROCEDURE — 93312 ECHO TRANSESOPHAGEAL: CPT | Mod: 26,,, | Performed by: STUDENT IN AN ORGANIZED HEALTH CARE EDUCATION/TRAINING PROGRAM

## 2023-08-23 PROCEDURE — 93325 DOPPLER ECHO COLOR FLOW MAPG: CPT | Mod: 26,,, | Performed by: STUDENT IN AN ORGANIZED HEALTH CARE EDUCATION/TRAINING PROGRAM

## 2023-08-23 PROCEDURE — 93320 TRANSESOPHAGEAL ECHO (TEE) W/ POSSIBLE CARDIOVERSION: ICD-10-PCS | Mod: 26,,, | Performed by: STUDENT IN AN ORGANIZED HEALTH CARE EDUCATION/TRAINING PROGRAM

## 2023-08-23 PROCEDURE — 93005 ELECTROCARDIOGRAM TRACING: CPT

## 2023-08-23 PROCEDURE — 99238 PR HOSPITAL DISCHARGE DAY,<30 MIN: ICD-10-PCS | Mod: ,,, | Performed by: STUDENT IN AN ORGANIZED HEALTH CARE EDUCATION/TRAINING PROGRAM

## 2023-08-23 PROCEDURE — 63600175 PHARM REV CODE 636 W HCPCS: Performed by: STUDENT IN AN ORGANIZED HEALTH CARE EDUCATION/TRAINING PROGRAM

## 2023-08-23 PROCEDURE — 93325 TRANSESOPHAGEAL ECHO (TEE) W/ POSSIBLE CARDIOVERSION: ICD-10-PCS | Mod: 26,,, | Performed by: STUDENT IN AN ORGANIZED HEALTH CARE EDUCATION/TRAINING PROGRAM

## 2023-08-23 PROCEDURE — 93010 EKG 12-LEAD: ICD-10-PCS | Mod: ,,, | Performed by: STUDENT IN AN ORGANIZED HEALTH CARE EDUCATION/TRAINING PROGRAM

## 2023-08-23 RX ORDER — LIDOCAINE HYDROCHLORIDE 20 MG/ML
SOLUTION OROPHARYNGEAL
Status: DISCONTINUED | OUTPATIENT
Start: 2023-08-23 | End: 2023-08-28 | Stop reason: HOSPADM

## 2023-08-23 RX ORDER — MIDAZOLAM HYDROCHLORIDE 1 MG/ML
INJECTION INTRAMUSCULAR; INTRAVENOUS
Status: DISCONTINUED | OUTPATIENT
Start: 2023-08-23 | End: 2023-08-28 | Stop reason: HOSPADM

## 2023-08-23 RX ORDER — LIDOCAINE HYDROCHLORIDE 20 MG/ML
15 SOLUTION OROPHARYNGEAL ONCE
Status: DISCONTINUED | OUTPATIENT
Start: 2023-08-23 | End: 2023-08-28 | Stop reason: HOSPADM

## 2023-08-23 RX ORDER — FENTANYL CITRATE 50 UG/ML
INJECTION, SOLUTION INTRAMUSCULAR; INTRAVENOUS
Status: DISCONTINUED | OUTPATIENT
Start: 2023-08-23 | End: 2023-08-28 | Stop reason: HOSPADM

## 2023-08-23 NOTE — H&P
Subjective:   Ade Grant is a 68 y.o. female with hx of hypertension who presents for a follow-up visit.      07/20/2023: Presents for follow-up. Endorses dyspnea on exertion. ROS positive for orthopnea. No leg swelling.     05/01/2023: Complains of chest pain going on for years. Last saw a cardiologist in [place], [place] about 5-6 years ago who has since retired as well. Complains of central chest pain which sometimes feels like heart burn. Also complains of dyspnea on exertion- ongoing for several years, no echo at least in the last 5 years.  Does not recall having had a stress test or a cardiac catheterization in the past.  She is on lasix 40 mg daily- started recently by Dr. Dominguez.     Fhx: Mother had CABG at around 65 also had PAD, older brother had CABG in his 40s      Shx: Active smoker - 1 PPD for 54 pack years      EKG 5/1/23: NSR, 1st degree AV block, LBBB      ECHO   Results for orders placed during the hospital encounter of 05/19/23     Echo     Interpretation Summary  · The left ventricle is normal in size with normal systolic function.  · The estimated ejection fraction is 55%.  · Normal left ventricular diastolic function.  · Atrial fibrillation not observed.  · Normal right ventricular size with normal right ventricular systolic function.  · Moderate-to-severe mitral regurgitation.  · Mild aortic regurgitation.  · Normal central venous pressure (3 mmHg).  · The estimated PA systolic pressure is 24 mmHg.        CATH: No results found for this or any previous visit.        NM MPI 5/19/23:  Impression:     1.  Scintigraphically negative for ischemia or infarct.  2. the global left ventricular systolic function is normal with an LV ejection fraction of 90 % and no evidence of LV dilatation. Wall motion is normal.           Lab Results   Component Value Date      (L) 04/12/2023     K 3.2 (L) 04/12/2023      04/12/2023     CO2 25 04/12/2023     BUN 16 04/12/2023     CREATININE 1.02 04/12/2023      CALCIUM 9.1 04/12/2023     ANIONGAP 11 04/12/2023     EGFRNONAA 74 03/17/2022               Lab Results   Component Value Date     CHOL 84 04/12/2023     CHOL 285 (H) 11/16/2022     CHOL 97 03/17/2022            Lab Results   Component Value Date     HDL 31 (L) 04/12/2023     HDL 31 (L) 11/16/2022     HDL 31 (L) 03/17/2022            Lab Results   Component Value Date     LDLCALC 17 04/12/2023     LDLCALC 196 11/16/2022     LDLCALC 23 03/17/2022            Lab Results   Component Value Date     TRIG 182 (H) 04/12/2023     TRIG 290 (H) 11/16/2022     TRIG 213 (H) 03/17/2022            Lab Results   Component Value Date     CHOLHDL 2.7 04/12/2023     CHOLHDL 9.2 11/16/2022     CHOLHDL 3.1 03/17/2022               Lab Results   Component Value Date     WBC 8.89 03/17/2022     HGB 15.7 03/17/2022     HCT 47.6 (H) 03/17/2022     MCV 90.2 03/17/2022      03/17/2022               Current Outpatient Medications:     blood pressure test kit-large Kit, 1 kit by Misc.(Non-Drug; Combo Route) route once daily., Disp: 1 each, Rfl: 0    carvediloL (COREG) 12.5 MG tablet, Take 2 tablets in the AM and 1 tablet in the PM, Disp: 270 tablet, Rfl: 1    cetirizine (ZYRTEC) 10 mg Cap, Take by mouth., Disp: , Rfl:     citalopram (CELEXA) 40 MG tablet, Take 40 mg by mouth once daily., Disp: , Rfl:     clopidogreL (PLAVIX) 75 mg tablet, Take 1 tablet (75 mg total) by mouth once daily., Disp: 90 tablet, Rfl: 1    cyclobenzaprine (FLEXERIL) 10 MG tablet, TAKE 1 TABLET BY MOUTH EVERY 8 (EIGHT) HOURS., Disp: 270 tablet, Rfl: 0    gabapentin (NEURONTIN) 600 MG tablet, Take 1 tablet (600 mg total) by mouth every 8 (eight) hours., Disp: 90 tablet, Rfl: 2    HYDROcodone-acetaminophen (NORCO)  mg per tablet, Take 1 tablet by mouth every 8 (eight) hours as needed., Disp: , Rfl:     lisinopriL-hydrochlorothiazide (PRINZIDE,ZESTORETIC) 20-25 mg Tab, Take 1 tablet by mouth once daily., Disp: 90 tablet, Rfl: 1    loratadine (CLARITIN) 10  "mg tablet, Take 1 tablet (10 mg total) by mouth once daily., Disp: 90 tablet, Rfl: 1    montelukast (SINGULAIR) 10 mg tablet, Take 1 tablet (10 mg total) by mouth every evening., Disp: 90 tablet, Rfl: 1    potassium chloride SA (K-DUR,KLOR-CON) 20 MEQ tablet, Take 1 tablet (20 mEq total) by mouth once daily., Disp: 90 tablet, Rfl: 1    rosuvastatin (CRESTOR) 40 MG Tab, Take 1 tablet (40 mg total) by mouth nightly., Disp: 90 tablet, Rfl: 1    sertraline (ZOLOFT) 50 MG tablet, Take 1 tablet (50 mg total) by mouth once daily., Disp: 90 tablet, Rfl: 1    furosemide (LASIX) 20 MG tablet, Take 1 tablet (20 mg total) by mouth once daily., Disp: 90 tablet, Rfl: 3     Review of Systems   Constitutional:  Negative for chills, diaphoresis, fever and malaise/fatigue.   Respiratory:  Positive for shortness of breath. Negative for cough.    Cardiovascular:  Positive for orthopnea. Negative for chest pain, palpitations, claudication, leg swelling and PND.   Gastrointestinal:  Negative for abdominal pain, heartburn, nausea and vomiting.   Neurological:  Negative for dizziness.         Objective:   BP (!) 130/90 (BP Location: Left arm, Patient Position: Sitting)   Pulse 80   Ht 5' 3" (1.6 m)   Wt 77.6 kg (171 lb)   SpO2 97%   BMI 30.29 kg/m²      Physical Exam  Constitutional:       General: She is not in acute distress.     Appearance: Normal appearance.   Cardiovascular:      Rate and Rhythm: Normal rate and regular rhythm.      Pulses: Normal pulses.      Heart sounds: Normal heart sounds. Systolic murmur    No friction rub. No gallop.   Pulmonary:      Effort: Pulmonary effort is normal.      Breath sounds: Normal breath sounds. No wheezing or rales.   Musculoskeletal:      Right lower leg: No edema.      Left lower leg: No edema.   Skin:     General: Skin is warm and dry.   Neurological:      Mental Status: She is alert.            Assessment:      1. Mitral valve insufficiency and aortic valve insufficiency  Basic " Metabolic Panel     Transesophageal echo (SUSAN) with possible cardioversion                   Plan:   No problem-specific Assessment & Plan notes found for this encounter.     Dyspnea on exertion  Moderate-to-severe MR on TTE  Proceed with SUSAN for further evaluation of etiology and severity of mitral regurgitation.   Normal LVEF and normal LV size.     Chest pain  Risk factors: DM, HTN, former smoking, FH  Baseline EKG: LBBB (chronicity is unclear)  Pharmacologic stress test with nuclear imaging with no evidence of ischemia or infarct.       Proceed with SUSAN

## 2023-08-31 PROBLEM — I34.0 MODERATE MITRAL REGURGITATION: Status: ACTIVE | Noted: 2023-08-31

## 2023-08-31 LAB
BSA FOR ECHO PROCEDURE: 1.86 M2
MR PISA EROA: 0.07 CM2
PISA MRMAX VEL: 5.15 M/S
PISA RADIUS: 0.37 CM
PISA VN NYQUIST MS: 0.39 M/S
PISA VN NYQUIST: 0.39 M/S

## 2023-08-31 NOTE — DISCHARGE SUMMARY
[facility] - Cath Lab  Discharge Note  Short Stay    Procedure(s) (LRB):  Transesophageal echo (SUSAN) intra-procedure log documentation (N/A)      OUTCOME: Patient tolerated treatment/procedure well without complication and is now ready for discharge.    DISPOSITION: Home or Self Care    FINAL DIAGNOSIS:  Moderate MR     FOLLOWUP: In clinic     DISCHARGE INSTRUCTIONS:    Discharge Procedure Orders   Transesophageal echo (SUSAN)   Standing Status: Future Standing Exp. Date: 07/20/24     Order Specific Question Answer Comments   Release to patient Immediate         TIME SPENT ON DISCHARGE: 15 minutes

## 2023-09-01 ENCOUNTER — PATIENT OUTREACH (OUTPATIENT)
Dept: ADMINISTRATIVE | Facility: HOSPITAL | Age: 68
End: 2023-09-01

## 2023-09-06 ENCOUNTER — OFFICE VISIT (OUTPATIENT)
Dept: CARDIOLOGY | Facility: CLINIC | Age: 68
End: 2023-09-06
Payer: MEDICARE

## 2023-09-06 VITALS
WEIGHT: 170 LBS | HEIGHT: 63 IN | DIASTOLIC BLOOD PRESSURE: 80 MMHG | OXYGEN SATURATION: 93 % | BODY MASS INDEX: 30.12 KG/M2 | SYSTOLIC BLOOD PRESSURE: 130 MMHG | HEART RATE: 75 BPM

## 2023-09-06 DIAGNOSIS — I08.0 MITRAL VALVE INSUFFICIENCY AND AORTIC VALVE INSUFFICIENCY: Primary | ICD-10-CM

## 2023-09-06 DIAGNOSIS — I34.0 MODERATE TO SEVERE MITRAL REGURGITATION: ICD-10-CM

## 2023-09-06 PROCEDURE — 3075F SYST BP GE 130 - 139MM HG: CPT | Mod: CPTII,,, | Performed by: STUDENT IN AN ORGANIZED HEALTH CARE EDUCATION/TRAINING PROGRAM

## 2023-09-06 PROCEDURE — 4010F ACE/ARB THERAPY RXD/TAKEN: CPT | Mod: CPTII,,, | Performed by: STUDENT IN AN ORGANIZED HEALTH CARE EDUCATION/TRAINING PROGRAM

## 2023-09-06 PROCEDURE — 3008F BODY MASS INDEX DOCD: CPT | Mod: CPTII,,, | Performed by: STUDENT IN AN ORGANIZED HEALTH CARE EDUCATION/TRAINING PROGRAM

## 2023-09-06 PROCEDURE — 3079F PR MOST RECENT DIASTOLIC BLOOD PRESSURE 80-89 MM HG: ICD-10-PCS | Mod: CPTII,,, | Performed by: STUDENT IN AN ORGANIZED HEALTH CARE EDUCATION/TRAINING PROGRAM

## 2023-09-06 PROCEDURE — 4010F PR ACE/ARB THEARPY RXD/TAKEN: ICD-10-PCS | Mod: CPTII,,, | Performed by: STUDENT IN AN ORGANIZED HEALTH CARE EDUCATION/TRAINING PROGRAM

## 2023-09-06 PROCEDURE — 1159F MED LIST DOCD IN RCRD: CPT | Mod: CPTII,,, | Performed by: STUDENT IN AN ORGANIZED HEALTH CARE EDUCATION/TRAINING PROGRAM

## 2023-09-06 PROCEDURE — 3079F DIAST BP 80-89 MM HG: CPT | Mod: CPTII,,, | Performed by: STUDENT IN AN ORGANIZED HEALTH CARE EDUCATION/TRAINING PROGRAM

## 2023-09-06 PROCEDURE — 3075F PR MOST RECENT SYSTOLIC BLOOD PRESS GE 130-139MM HG: ICD-10-PCS | Mod: CPTII,,, | Performed by: STUDENT IN AN ORGANIZED HEALTH CARE EDUCATION/TRAINING PROGRAM

## 2023-09-06 PROCEDURE — 99214 OFFICE O/P EST MOD 30 MIN: CPT | Mod: S$PBB,,, | Performed by: STUDENT IN AN ORGANIZED HEALTH CARE EDUCATION/TRAINING PROGRAM

## 2023-09-06 PROCEDURE — 99214 PR OFFICE/OUTPT VISIT, EST, LEVL IV, 30-39 MIN: ICD-10-PCS | Mod: S$PBB,,, | Performed by: STUDENT IN AN ORGANIZED HEALTH CARE EDUCATION/TRAINING PROGRAM

## 2023-09-06 PROCEDURE — 1159F PR MEDICATION LIST DOCUMENTED IN MEDICAL RECORD: ICD-10-PCS | Mod: CPTII,,, | Performed by: STUDENT IN AN ORGANIZED HEALTH CARE EDUCATION/TRAINING PROGRAM

## 2023-09-06 PROCEDURE — 99215 OFFICE O/P EST HI 40 MIN: CPT | Mod: PBBFAC | Performed by: STUDENT IN AN ORGANIZED HEALTH CARE EDUCATION/TRAINING PROGRAM

## 2023-09-06 PROCEDURE — 3008F PR BODY MASS INDEX (BMI) DOCUMENTED: ICD-10-PCS | Mod: CPTII,,, | Performed by: STUDENT IN AN ORGANIZED HEALTH CARE EDUCATION/TRAINING PROGRAM

## 2023-09-06 NOTE — PROGRESS NOTES
PCP: Loni Dominguez MD    Referring Provider: Loni Dominguez MD    Subjective:   Ade Grant is a 68 y.o. female with hx of hypertension who presents for a follow-up visit.     09/06/2023:  Presents for follow-up after SUSAN.  Complaints of occasional shortness of breath with exertion.  Otherwise doing well.     07/20/2023: Presents for follow-up. Endorses dyspnea on exertion. ROS positive for orthopnea. No leg swelling.    05/01/2023: Complains of chest pain going on for years. Last saw a cardiologist in [place], [place] about 5-6 years ago who has since retired as well. Complains of central chest pain which sometimes feels like heart burn. Also complains of dyspnea on exertion- ongoing for several years, no echo at least in the last 5 years.  Does not recall having had a stress test or a cardiac catheterization in the past.  She is on lasix 40 mg daily- started recently by Dr. Dominguez.    Fhx: Mother had CABG at around 65 also had PAD, older brother had CABG in his 40s     Shx: Active smoker - 1 PPD for 54 pack years     EKG 5/1/23: NSR, 1st degree AV block, LBBB     ECHO   Results for orders placed during the hospital encounter of 05/19/23    Echo    Interpretation Summary  · The left ventricle is normal in size with normal systolic function.  · The estimated ejection fraction is 55%.  · Normal left ventricular diastolic function.  · Atrial fibrillation not observed.  · Normal right ventricular size with normal right ventricular systolic function.  · Moderate-to-severe mitral regurgitation.  · Mild aortic regurgitation.  · Normal central venous pressure (3 mmHg).  · The estimated PA systolic pressure is 24 mmHg.       CATH: No results found for this or any previous visit.      NM MPI 5/19/23:  Impression:     1.  Scintigraphically negative for ischemia or infarct.  2. the global left ventricular systolic function is normal with an LV ejection fraction of 90 % and no evidence of LV dilatation. Wall motion is normal.     Lab  Results   Component Value Date     07/20/2023    K 3.9 07/20/2023     07/20/2023    CO2 29 07/20/2023    BUN 15 07/20/2023    CREATININE 0.72 07/20/2023    CALCIUM 9.4 07/20/2023    ANIONGAP 8 07/20/2023    EGFRNONAA 74 03/17/2022       Lab Results   Component Value Date    CHOL 84 04/12/2023    CHOL 285 (H) 11/16/2022    CHOL 97 03/17/2022     Lab Results   Component Value Date    HDL 31 (L) 04/12/2023    HDL 31 (L) 11/16/2022    HDL 31 (L) 03/17/2022     Lab Results   Component Value Date    LDLCALC 17 04/12/2023    LDLCALC 196 11/16/2022    LDLCALC 23 03/17/2022     Lab Results   Component Value Date    TRIG 182 (H) 04/12/2023    TRIG 290 (H) 11/16/2022    TRIG 213 (H) 03/17/2022     Lab Results   Component Value Date    CHOLHDL 2.7 04/12/2023    CHOLHDL 9.2 11/16/2022    CHOLHDL 3.1 03/17/2022       Lab Results   Component Value Date    WBC 8.89 03/17/2022    HGB 15.7 03/17/2022    HCT 47.6 (H) 03/17/2022    MCV 90.2 03/17/2022     03/17/2022           Current Outpatient Medications:     blood pressure test kit-large Kit, 1 kit by Misc.(Non-Drug; Combo Route) route once daily., Disp: 1 each, Rfl: 0    carvediloL (COREG) 12.5 MG tablet, Take 2 tablets in the AM and 1 tablet in the PM, Disp: 270 tablet, Rfl: 1    cetirizine (ZYRTEC) 10 mg Cap, Take by mouth., Disp: , Rfl:     citalopram (CELEXA) 40 MG tablet, Take 40 mg by mouth once daily., Disp: , Rfl:     clopidogreL (PLAVIX) 75 mg tablet, Take 1 tablet (75 mg total) by mouth once daily., Disp: 90 tablet, Rfl: 1    cyclobenzaprine (FLEXERIL) 10 MG tablet, TAKE 1 TABLET BY MOUTH EVERY 8 (EIGHT) HOURS., Disp: 270 tablet, Rfl: 0    furosemide (LASIX) 20 MG tablet, Take 1 tablet (20 mg total) by mouth once daily., Disp: 90 tablet, Rfl: 3    gabapentin (NEURONTIN) 600 MG tablet, Take 1 tablet (600 mg total) by mouth every 8 (eight) hours., Disp: 90 tablet, Rfl: 2    HYDROcodone-acetaminophen (NORCO)  mg per tablet, Take 1 tablet by mouth  "every 8 (eight) hours as needed for Pain., Disp: 90 tablet, Rfl: 0    lisinopriL-hydrochlorothiazide (PRINZIDE,ZESTORETIC) 20-25 mg Tab, Take 1 tablet by mouth once daily., Disp: 90 tablet, Rfl: 1    loratadine (CLARITIN) 10 mg tablet, Take 1 tablet (10 mg total) by mouth once daily., Disp: 90 tablet, Rfl: 1    montelukast (SINGULAIR) 10 mg tablet, Take 1 tablet (10 mg total) by mouth every evening., Disp: 90 tablet, Rfl: 1    potassium chloride SA (K-DUR,KLOR-CON) 20 MEQ tablet, Take 1 tablet (20 mEq total) by mouth once daily., Disp: 90 tablet, Rfl: 1    rosuvastatin (CRESTOR) 40 MG Tab, Take 1 tablet (40 mg total) by mouth nightly., Disp: 90 tablet, Rfl: 1    sertraline (ZOLOFT) 50 MG tablet, Take 1 tablet (50 mg total) by mouth once daily., Disp: 90 tablet, Rfl: 1    HYDROcodone-acetaminophen (NORCO)  mg per tablet, Take 1 tablet by mouth every 8 (eight) hours as needed for Pain. (Patient not taking: Reported on 9/6/2023), Disp: 90 tablet, Rfl: 0    [START ON 9/24/2023] HYDROcodone-acetaminophen (NORCO)  mg per tablet, Take 1 tablet by mouth every 8 (eight) hours as needed for Pain. (Patient not taking: Reported on 9/6/2023), Disp: 90 tablet, Rfl: 0    Review of Systems   Constitutional:  Negative for chills, diaphoresis, fever and malaise/fatigue.   Respiratory:  Positive for shortness of breath. Negative for cough.    Cardiovascular:  Negative for chest pain, palpitations, orthopnea, claudication, leg swelling and PND.   Gastrointestinal:  Negative for abdominal pain, heartburn, nausea and vomiting.   Neurological:  Negative for dizziness.         Objective:   /80 (BP Location: Left arm, Patient Position: Sitting)   Pulse 75   Ht 5' 3" (1.6 m)   Wt 77.1 kg (170 lb)   SpO2 (!) 93%   BMI 30.11 kg/m²     Physical Exam  Constitutional:       General: She is not in acute distress.     Appearance: Normal appearance.   Cardiovascular:      Rate and Rhythm: Normal rate and regular rhythm.      " Pulses: Normal pulses.      Heart sounds: Normal heart sounds. No murmur heard.     No friction rub. No gallop.   Pulmonary:      Effort: Pulmonary effort is normal.      Breath sounds: Normal breath sounds. No wheezing or rales.   Musculoskeletal:      Right lower leg: No edema.      Left lower leg: No edema.   Skin:     General: Skin is warm and dry.   Neurological:      Mental Status: She is alert.           Assessment:     1. Mitral valve insufficiency and aortic valve insufficiency        2. Moderate to severe mitral regurgitation                  Plan:   No problem-specific Assessment & Plan notes found for this encounter.    Moderate-to-severe MR   Normal LVEF and normal LV size.  Repeat echocardiogram in 6 months    Chest pain (resolved)  Risk factors: DM, HTN, former smoking, FH  Baseline EKG: LBBB (chronicity is unclear)  Pharmacologic stress test with nuclear imaging with no evidence of ischemia or infarct.     Follow-up in 6 months     .

## 2023-09-26 DIAGNOSIS — M54.17 LUMBOSACRAL RADICULOPATHY: Chronic | ICD-10-CM

## 2023-09-26 DIAGNOSIS — M62.830 BACK MUSCLE SPASM: ICD-10-CM

## 2023-09-26 DIAGNOSIS — M54.12 CERVICAL RADICULOPATHY: Chronic | ICD-10-CM

## 2023-09-26 RX ORDER — HYDROCODONE BITARTRATE AND ACETAMINOPHEN 10; 325 MG/1; MG/1
1 TABLET ORAL EVERY 8 HOURS PRN
Qty: 90 TABLET | Refills: 0 | Status: SHIPPED | OUTPATIENT
Start: 2023-09-26 | End: 2023-10-18 | Stop reason: SDUPTHER

## 2023-09-26 NOTE — TELEPHONE ENCOUNTER
----- Message from Malena Fields sent at 9/26/2023  1:14 PM CDT -----  [phone] PATIENT CALLED SAID SHE NEEDS HER NORCO SENT TO [facility].PLEASE CALL PATIENT WHEN COMPLETE  TANIKA NELSON   09/26/2023   2:01 PM   Will send to [facility]. Attempted to call no answer, unable to leave message.

## 2023-10-09 DIAGNOSIS — E87.6 HYPOKALEMIA: ICD-10-CM

## 2023-10-09 DIAGNOSIS — I10 PRIMARY HYPERTENSION: ICD-10-CM

## 2023-10-09 DIAGNOSIS — J30.2 SEASONAL ALLERGIES: ICD-10-CM

## 2023-10-10 RX ORDER — POTASSIUM CHLORIDE 20 MEQ/1
20 TABLET, EXTENDED RELEASE ORAL
Qty: 90 TABLET | Refills: 10 | Status: SHIPPED | OUTPATIENT
Start: 2023-10-10

## 2023-10-10 RX ORDER — MONTELUKAST SODIUM 10 MG/1
10 TABLET ORAL NIGHTLY
Qty: 90 TABLET | Refills: 10 | Status: SHIPPED | OUTPATIENT
Start: 2023-10-10

## 2023-10-18 NOTE — PROGRESS NOTES
Subjective:         Patient ID: Ade Grant is a 68 y.o. female.    Chief Complaint: Knee Pain (right), Back Pain, and Joint Pain      Pain  This is a chronic problem. The current episode started more than 1 year ago. The problem occurs daily. The problem has been waxing and waning. Associated symptoms include arthralgias, myalgias and neck pain. Pertinent negatives include no anorexia, chest pain, chills, coughing, diaphoresis, fever, sore throat, urinary symptoms, vertigo, visual change or vomiting.     Review of Systems   Constitutional:  Negative for activity change, appetite change, chills, diaphoresis, fever and unexpected weight change.   HENT:  Negative for drooling, ear discharge, ear pain, facial swelling, mouth sores, nosebleeds, sore throat, trouble swallowing, voice change and goiter.    Eyes:  Negative for photophobia, pain, discharge, redness and visual disturbance.   Respiratory:  Negative for apnea, cough, choking, chest tightness, shortness of breath, wheezing and stridor.    Cardiovascular:  Negative for chest pain, palpitations and leg swelling.   Gastrointestinal:  Negative for abdominal distention, anorexia, diarrhea, rectal pain, vomiting and fecal incontinence.   Endocrine: Negative for cold intolerance, heat intolerance, polydipsia, polyphagia and polyuria.   Genitourinary:  Negative for bladder incontinence, dysuria, flank pain, frequency and hot flashes.   Musculoskeletal:  Positive for arthralgias, back pain, leg pain, myalgias and neck pain.   Integumentary:  Negative for color change and pallor.   Allergic/Immunologic: Negative for immunocompromised state.   Neurological:  Negative for dizziness, vertigo, seizures, syncope, facial asymmetry, speech difficulty, light-headedness, memory loss and coordination difficulties.   Hematological:  Negative for adenopathy. Does not bruise/bleed easily.   Psychiatric/Behavioral:  Negative for agitation, behavioral problems, confusion,  decreased concentration, dysphoric mood, hallucinations, self-injury and suicidal ideas. The patient is not nervous/anxious and is not hyperactive.            Past Medical History:   Diagnosis Date    Anxiety     Cervical radiculopathy     Chronic pain syndrome     COPD (chronic obstructive pulmonary disease)     Depression     Enlarged heart     GERD (gastroesophageal reflux disease)     Hyperlipidemia     Hypertension     Lumbosacral radiculopathy     Obstructive sleep apnea     Other long term (current) drug therapy     Presence of right artificial knee joint     Vitamin D deficiency      Past Surgical History:   Procedure Laterality Date    ARTHROSCOPY OF HIP Left     CAUDAL EPIDURAL STEROID INJECTION N/A 06/04/2018 2/5/2018    Dr Hawkins    ECHOCARDIOGRAM,TRANSESOPHAGEAL N/A 8/23/2023    Procedure: Transesophageal echo (SUSAN) intra-procedure log documentation;  Surgeon: Kate Ramirez MD;  Location: [facility] CATH LAB;  Service: Cardiology;  Laterality: N/A;    HYSTERECTOMY      INJECTION OF ANESTHETIC AGENT INTO SACROILIAC JOINT Bilateral 12/15/2022    Procedure: BLOCK, SACROILIAC JOINT   BILATERAL;  Surgeon: Riya Armas MD;  Location: [facility] PAIN MGMT;  Service: Pain Management;  Laterality: Bilateral;    KNEE ARTHROSCOPY Right     LUMBAR FUSION       Social History     Socioeconomic History    Marital status:     Number of children: 3   Tobacco Use    Smoking status: Every Day     Current packs/day: 1.00     Types: Cigarettes    Smokeless tobacco: Never   Substance and Sexual Activity    Alcohol use: Not Currently    Drug use: Yes     Types: Hydrocodone    Sexual activity: Not Currently     Family History   Problem Relation Age of Onset    Cancer Mother     Heart disease Mother     Hypertension Mother     Heart disease Father     Thyroid cancer Brother     Bone cancer Brother     Colon cancer Brother     Thyroid cancer Other     Cancer Other      Review of patient's allergies indicates:   Allergen  "Reactions    Fenofibrate nanocrystallized Rash        Objective:  Vitals:    10/23/23 1253 10/23/23 1256   BP: (!) 139/91    Pulse: 91    Resp: 20    Weight: 77.1 kg (170 lb)    Height: 5' 3" (1.6 m)    PainSc:   4   4         Physical Exam  Vitals and nursing note reviewed. Exam conducted with a chaperone present.   Constitutional:       General: She is awake. She is not in acute distress.     Appearance: Normal appearance. She is not ill-appearing or toxic-appearing.   HENT:      Head: Normocephalic and atraumatic.      Nose: Nose normal.      Mouth/Throat:      Mouth: Mucous membranes are moist.      Pharynx: Oropharynx is clear.   Eyes:      Conjunctiva/sclera: Conjunctivae normal.      Pupils: Pupils are equal, round, and reactive to light.   Cardiovascular:      Rate and Rhythm: Normal rate.   Pulmonary:      Effort: Pulmonary effort is normal. No respiratory distress.   Abdominal:      Palpations: Abdomen is soft.      Tenderness: There is no guarding.   Musculoskeletal:         General: Normal range of motion.      Cervical back: Normal range of motion and neck supple. Tenderness present. No rigidity.      Thoracic back: Tenderness present.      Lumbar back: Tenderness present.   Skin:     General: Skin is warm and dry.      Coloration: Skin is not jaundiced or pale.   Neurological:      General: No focal deficit present.      Mental Status: She is alert and oriented to person, place, and time. Mental status is at baseline.      Cranial Nerves: No cranial nerve deficit (II-XII).   Psychiatric:         Mood and Affect: Mood normal.         Behavior: Behavior normal. Behavior is cooperative.         Thought Content: Thought content normal.           Intra-Procedure Documentation    The estimated blood loss was none.    Please see SUSAN reported separately.  Transesophageal echo (SUSAN) with possible cardioversion    Left Ventricle: The left ventricle is normal in size. Normal wall   thickness. Normal wall motion. " There is normal systolic function.    Left Atrium: The left atrial appendage appears normal. Appendage   velocity is normal at greater than 40 cm/sec.    Right Ventricle: Normal right ventricular cavity size. Systolic   function is normal.    Mitral Valve: There is moderate regurgitation.       Hospital Outpatient Visit on 08/23/2023   Component Date Value Ref Range Status    BSA 08/23/2023 1.86  m2 Final    Vn Nyquist MS 08/23/2023 0.39  m/s Final    MR ELMER BARONOA 08/23/2023 0.07  cm2 Final    Radius 08/23/2023 0.37  cm Final    Vn Nyquist 08/23/2023 0.39  m/s Final    Mr max shiva 08/23/2023 5.15  m/s Final   Office Visit on 07/26/2023   Component Date Value Ref Range Status    POC Amphetamines 07/26/2023 Negative  Negative, Inconclusive Final    POC Barbiturates 07/26/2023 Negative  Negative, Inconclusive Final    POC Benzodiazepines 07/26/2023 Negative  Negative, Inconclusive Final    POC Cocaine 07/26/2023 Negative  Negative, Inconclusive Final    POC THC 07/26/2023 Negative  Negative, Inconclusive Final    POC Methadone 07/26/2023 Negative  Negative, Inconclusive Final    POC Methamphetamine 07/26/2023 Negative  Negative, Inconclusive Final    POC Opiates 07/26/2023 Presumptive Positive (A)  Negative, Inconclusive Final    POC Oxycodone 07/26/2023 Negative  Negative, Inconclusive Final    POC Phencyclidine 07/26/2023 Negative  Negative, Inconclusive Final    POC Methylenedioxymethamphetamine * 07/26/2023 Negative  Negative, Inconclusive Final    POC Tricyclic Antidepressants 07/26/2023 Negative  Negative, Inconclusive Final    POC Buprenorphine 07/26/2023 Negative   Final     Acceptable 07/26/2023 Yes   Final    POC Temperature (Urine) 07/26/2023 90   Final   Lab Visit on 07/20/2023   Component Date Value Ref Range Status    Sodium 07/20/2023 137  136 - 145 mmol/L Final    Potassium 07/20/2023 3.9  3.5 - 5.1 mmol/L Final    Chloride 07/20/2023 104  98 - 107 mmol/L Final    CO2 07/20/2023 29  21 -  32 mmol/L Final    Anion Gap 07/20/2023 8  7 - 16 mmol/L Final    Glucose 07/20/2023 118 (H)  74 - 106 mg/dL Final    BUN 07/20/2023 15  7 - 18 mg/dL Final    Creatinine 07/20/2023 0.72  0.55 - 1.02 mg/dL Final    BUN/Creatinine Ratio 07/20/2023 21 (H)  6 - 20 Final    Calcium 07/20/2023 9.4  8.5 - 10.1 mg/dL Final    eGFR 07/20/2023 92  >=60 mL/min/1.73m2 Final   Hospital Outpatient Visit on 05/19/2023   Component Date Value Ref Range Status    85% Max Predicted HR 05/19/2023 126   Final    Max Predicted HR 05/19/2023 148   Final    OHS CV CPX PATIENT IS MALE 05/19/2023 0.0   Final    [facility] CV CPX PATIENT IS FEMALE 05/19/2023 1.0   Final    Systolic blood pressure 05/19/2023 91  mmHg Final    Diastolic blood pressure 05/19/2023 59  mmHg Final    HR at rest 05/19/2023 75  bpm Final    Peak Systolic BP 05/19/2023 92  mmHg Final    Peak Diatolic BP 05/19/2023 74  mmHg Final    Peak HR 05/19/2023 78  bpm Final    % Max HR Achieved 05/19/2023 53   Final    1 Minute Recovery HR 05/19/2023 74  bpm Final    RPP 05/19/2023 6,825   Final    Peak RPP 05/19/2023 7,176   Final   Hospital Outpatient Visit on 05/19/2023   Component Date Value Ref Range Status    BSA 05/19/2023 1.87  m2 Final    AORTIC VALVE CUSP SEPERATION 05/19/2023 1.89  cm Final    LVIDd 05/19/2023 3.10 (A)  3.5 - 6.0 cm Final    IVS 05/19/2023 0.95  0.6 - 1.1 cm Final    Posterior Wall 05/19/2023 0.59 (A)  0.6 - 1.1 cm Final    LVIDs 05/19/2023 2.52  2.1 - 4.0 cm Final    FS 05/19/2023 19  28 - 44 % Final    LV mass 05/19/2023 58.93  g Final    LA size 05/19/2023 4.10  cm Final    RVDD 05/19/2023 2.20  cm Final    TAPSE 05/19/2023 1.70  cm Final    RA area 05/19/2023 7.3  cm2 Final    Left Ventricle Relative Wall Thick* 05/19/2023 0.38  cm Final    AV mean gradient 05/19/2023 2  mmHg Final    E wave deceleration time 05/19/2023 300.00  msec Final    LVOT diameter 05/19/2023 1.80  cm Final    LVOT area 05/19/2023 2.5  cm2 Final    Ao peak shiva 05/19/2023 3.1  m/s  Final    Ao VTI 05/19/2023 62.83  cm Final    AV peak gradient 05/19/2023 38  mmHg Final    MV Peak E Valerio 05/19/2023 0.63  m/s Final    TR Max Valerio 05/19/2023 2.28  m/s Final    LV Systolic Volume 05/19/2023 22.77  mL Final    LV Systolic Volume Index 05/19/2023 12.5  mL/m2 Final    LV Diastolic Volume 05/19/2023 37.92  mL Final    LV Diastolic Volume Index 05/19/2023 20.84  mL/m2 Final    LV Mass Index 05/19/2023 32  g/m2 Final    Triscuspid Valve Regurgitation Pea* 05/19/2023 21  mmHg Final    Right Atrial Pressure (from IVC) 05/19/2023 3  mmHg Final    EF 05/19/2023 55  % Final    TV resting pulmonary artery pressu* 05/19/2023 24  mmHg Final   Office Visit on 05/11/2023   Component Date Value Ref Range Status    ProBNP 05/11/2023 75  1 - 125 pg/mL Final   Office Visit on 04/26/2023   Component Date Value Ref Range Status    POC Amphetamines 04/26/2023 Negative  Negative, Inconclusive Final    POC Barbiturates 04/26/2023 Negative  Negative, Inconclusive Final    POC Benzodiazepines 04/26/2023 Negative  Negative, Inconclusive Final    POC Cocaine 04/26/2023 Negative  Negative, Inconclusive Final    POC THC 04/26/2023 Negative  Negative, Inconclusive Final    POC Methadone 04/26/2023 Negative  Negative, Inconclusive Final    POC Methamphetamine 04/26/2023 Negative  Negative, Inconclusive Final    POC Opiates 04/26/2023 Negative  Negative, Inconclusive Final    POC Oxycodone 04/26/2023 Negative  Negative, Inconclusive Final    POC Phencyclidine 04/26/2023 Negative  Negative, Inconclusive Final    POC Methylenedioxymethamphetamine * 04/26/2023 Negative  Negative, Inconclusive Final    POC Tricyclic Antidepressants 04/26/2023 Negative  Negative, Inconclusive Final    POC Buprenorphine 04/26/2023 Negative   Final     Acceptable 04/26/2023 Yes   Final    POC Temperature (Urine) 04/26/2023 92   Final    pH, UA 04/26/2023 8.0  5.0 to 8.0 pH Units Final    Creatinine, Urine 04/26/2023 34  28 - 219 mg/dL Final     6-Acetylmorphine 04/26/2023 Negative  10 ng/mL Final    7-Aminoclonazepam 04/26/2023 Negative  Negative 25 ng/mL Final    a-Hydroxyalprazolam 04/26/2023 Negative  Negative 25 ng/mL Final    Acetyl Fentanyl 04/26/2023 Negative  2.5 ng/mL Final    Acetyl Norfentanyl Oxalate 04/26/2023 Negative  5 ng/mL Final    Benzoylecgonine 04/26/2023 Negative  100 ng/mL Final    Buprenorphine 04/26/2023 Negative  25 ng/mL Final    Codeine 04/26/2023 Negative  25 ng/mL Final    EDDP 04/26/2023 Negative  25 ng/mL Final    Fentanyl 04/26/2023 Negative  2.5 ng/mL Final    Hydrocodone 04/26/2023 120.1 (H)  <25.0 25 ng/mL Final    Hydromorphone 04/26/2023 Negative  25 ng/mL Final    Lorazepam 04/26/2023 Negative  25 ng/mL Final    Morphine 04/26/2023 Negative  25 ng/mL Final    Norbuprenorphine 04/26/2023 Negative  25 ng/mL Final    Nordiazepam 04/26/2023 Negative  25 ng/mL Final    Norfentanyl Oxalate 04/26/2023 Negative  5 ng/mL Final    Norhydrocodone 04/26/2023 499.4 (H)  <50.0 50 ng/mL Final    Noroxycodone HCL 04/26/2023 Negative  50 ng/mL Final    Oxazepam 04/26/2023 Negative  25 ng/mL Final    Oxymorphone 04/26/2023 Negative  25 ng/mL Final    Tapentadol 04/26/2023 Negative  25 ng/mL Final    Temazepam 04/26/2023 Negative  25 ng/mL Final    THC-COOH 04/26/2023 Negative  25 ng/mL Final    Tramadol 04/26/2023 Negative  100 ng/mL Final    Amphetamine, Urine 04/26/2023 Negative  Negative Final    Methamphetamines, Urine 04/26/2023 Negative  Negative Final    Methadone, Urine 04/26/2023 Negative  Negative 25 ng/mL Final    Oxycodone, Urine 04/26/2023 Negative  Negative 25 ng/mL Final    Specific Gravity, UA 04/26/2023 1.009  <=1.030 Final         Orders Placed This Encounter   Procedures    POCT Urine Drug Screen Presump     Interpretive Information:     Negative:  No drug detected at the cut off level.   Positive:  This result represents presumptive positive for the   tested drug, other substances may yield a positive response  other   than the analyte of interest. This result should be utilized for   diagnostic purpose only. Confirmation testing will be performed upon physician request only.          Requested Prescriptions     Signed Prescriptions Disp Refills    cyclobenzaprine (FLEXERIL) 10 MG tablet 270 tablet 0     Sig: Take 1 tablet (10 mg total) by mouth every 8 (eight) hours.    HYDROcodone-acetaminophen (NORCO)  mg per tablet 90 tablet 0     Sig: Take 1 tablet by mouth every 8 (eight) hours as needed for Pain.    HYDROcodone-acetaminophen (NORCO)  mg per tablet 90 tablet 0     Sig: Take 1 tablet by mouth every 8 (eight) hours as needed for Pain.    HYDROcodone-acetaminophen (NORCO)  mg per tablet 90 tablet 0     Sig: Take 1 tablet by mouth every 8 (eight) hours as needed for Pain.    gabapentin (NEURONTIN) 600 MG tablet 90 tablet 2     Sig: Take 1 tablet (600 mg total) by mouth every 8 (eight) hours.       Assessment:     1. Lumbosacral radiculopathy    2. Back muscle spasm    3. Cervical radiculopathy    4. Encounter for long-term (current) use of other medications         A's of Opioid Risk Assessment  Activity:Patient can perform ADL.   Analgesia:Patients pain is partially controlled by current medication. Patient has tried OTC medications such as Tylenol and Ibuprofen with out relief.   Adverse Effects: Patient denies constipation or sedation.  Aberrant Behavior:  reviewed with no aberrant drug seeking/taking behavior.  Overdose reversal drug naloxone discussed    Drug screen reviewed      Plan:    Narcan January 2023     History lumbar surgery x2 L4 through S1 lumbar fusion Dr. Villeda      She had bilateral sacroiliac injection # 1 December 15, 2022, patient states she had 90% relief after procedure, procedure did help improve her level of function     She is considering having hardware removed from her left thigh/hip     She has no new complaints today she would like to continue with current  medications    She states current medications helping control her discomfort    She would like to continue conservative management     Continue current medication    Follow-up 3 months     Dr. Armas, December 2023    Bring original prescription medication bottles/container/box with labels to each visit

## 2023-10-23 ENCOUNTER — OFFICE VISIT (OUTPATIENT)
Dept: PAIN MEDICINE | Facility: CLINIC | Age: 68
End: 2023-10-23
Payer: MEDICARE

## 2023-10-23 VITALS
HEART RATE: 91 BPM | HEIGHT: 63 IN | RESPIRATION RATE: 20 BRPM | DIASTOLIC BLOOD PRESSURE: 91 MMHG | WEIGHT: 170 LBS | BODY MASS INDEX: 30.12 KG/M2 | SYSTOLIC BLOOD PRESSURE: 139 MMHG

## 2023-10-23 DIAGNOSIS — M54.12 CERVICAL RADICULOPATHY: Chronic | ICD-10-CM

## 2023-10-23 DIAGNOSIS — M54.17 LUMBOSACRAL RADICULOPATHY: Primary | Chronic | ICD-10-CM

## 2023-10-23 DIAGNOSIS — M62.830 BACK MUSCLE SPASM: ICD-10-CM

## 2023-10-23 DIAGNOSIS — Z79.899 ENCOUNTER FOR LONG-TERM (CURRENT) USE OF OTHER MEDICATIONS: ICD-10-CM

## 2023-10-23 PROCEDURE — 99999PBSHW POCT URINE DRUG SCREEN PRESUMP: ICD-10-PCS | Mod: PBBFAC,,,

## 2023-10-23 PROCEDURE — 3288F FALL RISK ASSESSMENT DOCD: CPT | Mod: CPTII,,, | Performed by: PHYSICIAN ASSISTANT

## 2023-10-23 PROCEDURE — 1125F PR PAIN SEVERITY QUANTIFIED, PAIN PRESENT: ICD-10-PCS | Mod: CPTII,,, | Performed by: PHYSICIAN ASSISTANT

## 2023-10-23 PROCEDURE — 3288F PR FALLS RISK ASSESSMENT DOCUMENTED: ICD-10-PCS | Mod: CPTII,,, | Performed by: PHYSICIAN ASSISTANT

## 2023-10-23 PROCEDURE — 1159F MED LIST DOCD IN RCRD: CPT | Mod: CPTII,,, | Performed by: PHYSICIAN ASSISTANT

## 2023-10-23 PROCEDURE — 1101F PT FALLS ASSESS-DOCD LE1/YR: CPT | Mod: CPTII,,, | Performed by: PHYSICIAN ASSISTANT

## 2023-10-23 PROCEDURE — 3008F PR BODY MASS INDEX (BMI) DOCUMENTED: ICD-10-PCS | Mod: CPTII,,, | Performed by: PHYSICIAN ASSISTANT

## 2023-10-23 PROCEDURE — 99214 OFFICE O/P EST MOD 30 MIN: CPT | Mod: PBBFAC | Performed by: PHYSICIAN ASSISTANT

## 2023-10-23 PROCEDURE — 1125F AMNT PAIN NOTED PAIN PRSNT: CPT | Mod: CPTII,,, | Performed by: PHYSICIAN ASSISTANT

## 2023-10-23 PROCEDURE — 4010F ACE/ARB THERAPY RXD/TAKEN: CPT | Mod: CPTII,,, | Performed by: PHYSICIAN ASSISTANT

## 2023-10-23 PROCEDURE — 3080F PR MOST RECENT DIASTOLIC BLOOD PRESSURE >= 90 MM HG: ICD-10-PCS | Mod: CPTII,,, | Performed by: PHYSICIAN ASSISTANT

## 2023-10-23 PROCEDURE — 3075F PR MOST RECENT SYSTOLIC BLOOD PRESS GE 130-139MM HG: ICD-10-PCS | Mod: CPTII,,, | Performed by: PHYSICIAN ASSISTANT

## 2023-10-23 PROCEDURE — 99214 OFFICE O/P EST MOD 30 MIN: CPT | Mod: S$PBB,,, | Performed by: PHYSICIAN ASSISTANT

## 2023-10-23 PROCEDURE — 3075F SYST BP GE 130 - 139MM HG: CPT | Mod: CPTII,,, | Performed by: PHYSICIAN ASSISTANT

## 2023-10-23 PROCEDURE — 3080F DIAST BP >= 90 MM HG: CPT | Mod: CPTII,,, | Performed by: PHYSICIAN ASSISTANT

## 2023-10-23 PROCEDURE — 1159F PR MEDICATION LIST DOCUMENTED IN MEDICAL RECORD: ICD-10-PCS | Mod: CPTII,,, | Performed by: PHYSICIAN ASSISTANT

## 2023-10-23 PROCEDURE — 4010F PR ACE/ARB THEARPY RXD/TAKEN: ICD-10-PCS | Mod: CPTII,,, | Performed by: PHYSICIAN ASSISTANT

## 2023-10-23 PROCEDURE — 3008F BODY MASS INDEX DOCD: CPT | Mod: CPTII,,, | Performed by: PHYSICIAN ASSISTANT

## 2023-10-23 PROCEDURE — 99999PBSHW POCT URINE DRUG SCREEN PRESUMP: Mod: PBBFAC,,,

## 2023-10-23 PROCEDURE — 80305 DRUG TEST PRSMV DIR OPT OBS: CPT | Mod: PBBFAC | Performed by: PHYSICIAN ASSISTANT

## 2023-10-23 PROCEDURE — 99214 PR OFFICE/OUTPT VISIT, EST, LEVL IV, 30-39 MIN: ICD-10-PCS | Mod: S$PBB,,, | Performed by: PHYSICIAN ASSISTANT

## 2023-10-23 PROCEDURE — 1101F PR PT FALLS ASSESS DOC 0-1 FALLS W/OUT INJ PAST YR: ICD-10-PCS | Mod: CPTII,,, | Performed by: PHYSICIAN ASSISTANT

## 2023-10-23 RX ORDER — HYDROCODONE BITARTRATE AND ACETAMINOPHEN 10; 325 MG/1; MG/1
1 TABLET ORAL EVERY 8 HOURS PRN
Qty: 90 TABLET | Refills: 0 | Status: SHIPPED | OUTPATIENT
Start: 2023-11-24 | End: 2024-02-05

## 2023-10-23 RX ORDER — HYDROCODONE BITARTRATE AND ACETAMINOPHEN 10; 325 MG/1; MG/1
1 TABLET ORAL EVERY 8 HOURS PRN
Qty: 90 TABLET | Refills: 0 | Status: SHIPPED | OUTPATIENT
Start: 2023-12-23

## 2023-10-23 RX ORDER — HYDROCODONE BITARTRATE AND ACETAMINOPHEN 10; 325 MG/1; MG/1
1 TABLET ORAL EVERY 8 HOURS PRN
Qty: 90 TABLET | Refills: 0 | Status: SHIPPED | OUTPATIENT
Start: 2023-10-26 | End: 2024-02-05

## 2023-10-23 RX ORDER — GABAPENTIN 600 MG/1
600 TABLET ORAL EVERY 8 HOURS
Qty: 90 TABLET | Refills: 2 | Status: SHIPPED | OUTPATIENT
Start: 2023-10-23 | End: 2024-01-22 | Stop reason: SDUPTHER

## 2023-10-23 RX ORDER — CYCLOBENZAPRINE HCL 10 MG
10 TABLET ORAL EVERY 8 HOURS
Qty: 270 TABLET | Refills: 0 | Status: SHIPPED | OUTPATIENT
Start: 2023-10-23 | End: 2024-01-08

## 2023-12-09 DIAGNOSIS — Z71.89 COMPLEX CARE COORDINATION: ICD-10-CM

## 2024-01-08 DIAGNOSIS — M54.17 LUMBOSACRAL RADICULOPATHY: Chronic | ICD-10-CM

## 2024-01-08 DIAGNOSIS — M62.830 BACK MUSCLE SPASM: ICD-10-CM

## 2024-01-08 RX ORDER — CYCLOBENZAPRINE HCL 10 MG
10 TABLET ORAL EVERY 8 HOURS
Qty: 270 TABLET | Refills: 3 | Status: SHIPPED | OUTPATIENT
Start: 2024-01-08 | End: 2024-01-22 | Stop reason: SDUPTHER

## 2024-01-18 DIAGNOSIS — M54.17 LUMBOSACRAL RADICULOPATHY: Chronic | ICD-10-CM

## 2024-01-18 DIAGNOSIS — M54.12 CERVICAL RADICULOPATHY: Chronic | ICD-10-CM

## 2024-01-18 RX ORDER — GABAPENTIN 600 MG/1
600 TABLET ORAL EVERY 8 HOURS
Qty: 90 TABLET | Refills: 3 | OUTPATIENT
Start: 2024-01-18

## 2024-01-21 DIAGNOSIS — I10 UNCONTROLLED HYPERTENSION: Chronic | ICD-10-CM

## 2024-01-22 ENCOUNTER — OFFICE VISIT (OUTPATIENT)
Dept: PAIN MEDICINE | Facility: CLINIC | Age: 69
End: 2024-01-22
Payer: MEDICARE

## 2024-01-22 VITALS
BODY MASS INDEX: 29.06 KG/M2 | HEART RATE: 93 BPM | DIASTOLIC BLOOD PRESSURE: 100 MMHG | SYSTOLIC BLOOD PRESSURE: 182 MMHG | WEIGHT: 164 LBS | HEIGHT: 63 IN

## 2024-01-22 DIAGNOSIS — M62.830 BACK MUSCLE SPASM: ICD-10-CM

## 2024-01-22 DIAGNOSIS — M54.12 CERVICAL RADICULOPATHY: Chronic | ICD-10-CM

## 2024-01-22 DIAGNOSIS — Z79.899 ENCOUNTER FOR LONG-TERM (CURRENT) USE OF OTHER MEDICATIONS: Primary | ICD-10-CM

## 2024-01-22 DIAGNOSIS — M54.17 LUMBOSACRAL RADICULOPATHY: Chronic | ICD-10-CM

## 2024-01-22 PROCEDURE — 99214 OFFICE O/P EST MOD 30 MIN: CPT | Mod: S$PBB,,, | Performed by: PAIN MEDICINE

## 2024-01-22 PROCEDURE — 99214 OFFICE O/P EST MOD 30 MIN: CPT | Mod: PBBFAC | Performed by: PAIN MEDICINE

## 2024-01-22 PROCEDURE — 3077F SYST BP >= 140 MM HG: CPT | Mod: CPTII,,, | Performed by: PAIN MEDICINE

## 2024-01-22 PROCEDURE — 1101F PT FALLS ASSESS-DOCD LE1/YR: CPT | Mod: CPTII,,, | Performed by: PAIN MEDICINE

## 2024-01-22 PROCEDURE — 99999PBSHW POCT URINE DRUG SCREEN PRESUMP: Mod: PBBFAC,,,

## 2024-01-22 PROCEDURE — 1125F AMNT PAIN NOTED PAIN PRSNT: CPT | Mod: CPTII,,, | Performed by: PAIN MEDICINE

## 2024-01-22 PROCEDURE — 80305 DRUG TEST PRSMV DIR OPT OBS: CPT | Mod: PBBFAC | Performed by: PAIN MEDICINE

## 2024-01-22 PROCEDURE — 1159F MED LIST DOCD IN RCRD: CPT | Mod: CPTII,,, | Performed by: PAIN MEDICINE

## 2024-01-22 PROCEDURE — 3008F BODY MASS INDEX DOCD: CPT | Mod: CPTII,,, | Performed by: PAIN MEDICINE

## 2024-01-22 PROCEDURE — 3288F FALL RISK ASSESSMENT DOCD: CPT | Mod: CPTII,,, | Performed by: PAIN MEDICINE

## 2024-01-22 PROCEDURE — 3080F DIAST BP >= 90 MM HG: CPT | Mod: CPTII,,, | Performed by: PAIN MEDICINE

## 2024-01-22 RX ORDER — HYDROCODONE BITARTRATE AND ACETAMINOPHEN 10; 325 MG/1; MG/1
1 TABLET ORAL EVERY 8 HOURS PRN
Qty: 90 TABLET | Refills: 0 | Status: SHIPPED | OUTPATIENT
Start: 2024-03-22 | End: 2024-02-05

## 2024-01-22 RX ORDER — GABAPENTIN 600 MG/1
600 TABLET ORAL EVERY 8 HOURS
Qty: 90 TABLET | Refills: 2 | Status: SHIPPED | OUTPATIENT
Start: 2024-01-22 | End: 2024-04-15 | Stop reason: SDUPTHER

## 2024-01-22 RX ORDER — HYDROCODONE BITARTRATE AND ACETAMINOPHEN 10; 325 MG/1; MG/1
1 TABLET ORAL EVERY 8 HOURS PRN
Qty: 90 TABLET | Refills: 0 | Status: SHIPPED | OUTPATIENT
Start: 2024-01-22 | End: 2024-01-22 | Stop reason: RX

## 2024-01-22 RX ORDER — HYDROCODONE BITARTRATE AND ACETAMINOPHEN 10; 325 MG/1; MG/1
1 TABLET ORAL EVERY 8 HOURS PRN
Qty: 90 TABLET | Refills: 0 | Status: SHIPPED | OUTPATIENT
Start: 2024-03-22 | End: 2024-01-22 | Stop reason: RX

## 2024-01-22 RX ORDER — HYDROCODONE BITARTRATE AND ACETAMINOPHEN 10; 325 MG/1; MG/1
1 TABLET ORAL EVERY 8 HOURS PRN
Qty: 90 TABLET | Refills: 0 | Status: SHIPPED | OUTPATIENT
Start: 2024-01-22 | End: 2024-01-24 | Stop reason: RX

## 2024-01-22 RX ORDER — HYDROCODONE BITARTRATE AND ACETAMINOPHEN 10; 325 MG/1; MG/1
1 TABLET ORAL EVERY 8 HOURS PRN
Qty: 90 TABLET | Refills: 0 | Status: SHIPPED | OUTPATIENT
Start: 2024-02-21 | End: 2024-01-22 | Stop reason: RX

## 2024-01-22 RX ORDER — CARVEDILOL 12.5 MG/1
TABLET ORAL
Qty: 270 TABLET | Refills: 3 | OUTPATIENT
Start: 2024-01-22

## 2024-01-22 RX ORDER — CYCLOBENZAPRINE HCL 10 MG
10 TABLET ORAL EVERY 8 HOURS
Qty: 270 TABLET | Refills: 2 | Status: SHIPPED | OUTPATIENT
Start: 2024-01-22 | End: 2024-04-15 | Stop reason: SDUPTHER

## 2024-01-22 RX ORDER — HYDROCODONE BITARTRATE AND ACETAMINOPHEN 10; 325 MG/1; MG/1
1 TABLET ORAL EVERY 8 HOURS PRN
Qty: 90 TABLET | Refills: 0 | Status: SHIPPED | OUTPATIENT
Start: 2024-02-21 | End: 2024-02-05

## 2024-01-22 NOTE — PROGRESS NOTES
She Disclaimer: This note has been generated using voice-recognition software. There may be typographical errors that have been missed during proof-reading        Patient ID: Ade Grant is a 69 y.o. female.      Chief Complaint: Leg Pain      69-year-old female returns for re-evaluation of chronic lower back, post-laminectomy pain and bilateral sacroiliitis.  She received bilateral SI joint injections December 15, 2022 and experienced greater than 90% pain relief.  Her pain has worsened over the past several weeks with the cold weather changes.  She describes chronic lower back pain but denies paresthesia or weakness of the lower extremities.  She describes the pain as aching.  She defers nerve block injections and returns today for medication refill.            Pain Assessment  Pain Assessment: 0-10  Pain Score:   3  Pain Location: Leg  Pain Orientation: Right  Pain Descriptors: Aching  Pain Frequency: Intermittent  Aggravating Factors: Standing  Pain Intervention(s): Medication (See eMAR)      A's of Opioid Risk Assessment  Activity:Patient can perform ADL.   Analgesia:Patients pain is  controlled by current medication.   Adverse Effects: Patient denies constipation or sedation.  Aberrant Behavior:  reviewed with no aberrant drug seeking/taking behavior.      Patient denies any suicidal or homicidal ideations    Physical Therapy/Home Exercise: yes weeks    Intra-Procedure Documentation    The estimated blood loss was none.    Please see SUSAN reported separately.  Transesophageal echo (SUSAN) with possible cardioversion    Left Ventricle: The left ventricle is normal in size. Normal wall   thickness. Normal wall motion. There is normal systolic function.    Left Atrium: The left atrial appendage appears normal. Appendage   velocity is normal at greater than 40 cm/sec.    Right Ventricle: Normal right ventricular cavity size. Systolic   function is normal.    Mitral Valve: There is moderate  regurgitation.      Review of Systems   Constitutional: Negative.    HENT: Negative.     Eyes: Negative.    Respiratory: Negative.     Cardiovascular: Negative.    Gastrointestinal: Negative.    Endocrine: Negative.    Genitourinary: Negative.    Musculoskeletal:  Positive for arthralgias and back pain.   Integumentary:  Negative.   Neurological: Negative.    Hematological: Negative.    Psychiatric/Behavioral: Negative.               Past Medical History:   Diagnosis Date    Anxiety     Cervical radiculopathy     Chronic pain syndrome     COPD (chronic obstructive pulmonary disease)     Depression     Enlarged heart     GERD (gastroesophageal reflux disease)     Hyperlipidemia     Hypertension     Lumbosacral radiculopathy     Obstructive sleep apnea     Other long term (current) drug therapy     Presence of right artificial knee joint     Vitamin D deficiency      Past Surgical History:   Procedure Laterality Date    ARTHROSCOPY OF HIP Left     CAUDAL EPIDURAL STEROID INJECTION N/A 06/04/2018 2/5/2018    Dr Hawkins    ECHOCARDIOGRAM,TRANSESOPHAGEAL N/A 8/23/2023    Procedure: Transesophageal echo (SUSAN) intra-procedure log documentation;  Surgeon: Kate Ramirez MD;  Location: [facility] CATH LAB;  Service: Cardiology;  Laterality: N/A;    HYSTERECTOMY      INJECTION OF ANESTHETIC AGENT INTO SACROILIAC JOINT Bilateral 12/15/2022    Procedure: BLOCK, SACROILIAC JOINT   BILATERAL;  Surgeon: Riya Armas MD;  Location: [facility] PAIN MGMT;  Service: Pain Management;  Laterality: Bilateral;    KNEE ARTHROSCOPY Right     LUMBAR FUSION       Social History     Socioeconomic History    Marital status:     Number of children: 3   Tobacco Use    Smoking status: Every Day     Current packs/day: 1.00     Types: Cigarettes    Smokeless tobacco: Never   Substance and Sexual Activity    Alcohol use: Not Currently    Drug use: Yes     Types: Hydrocodone    Sexual activity: Not Currently     Family History   Problem Relation  Age of Onset    Cancer Mother     Heart disease Mother     Hypertension Mother     Heart disease Father     Thyroid cancer Brother     Bone cancer Brother     Colon cancer Brother     Thyroid cancer Other     Cancer Other      Review of patient's allergies indicates:   Allergen Reactions    Fenofibrate nanocrystallized Rash     has a current medication list which includes the following prescription(s): blood pressure test kit-large, carvedilol, zyrtec, citalopram, clopidogrel, furosemide, hydrocodone-acetaminophen, hydrocodone-acetaminophen, hydrocodone-acetaminophen, lisinopril-hydrochlorothiazide, loratadine, montelukast, potassium chloride sa, rosuvastatin, sertraline, cyclobenzaprine, gabapentin, [START ON 2/21/2024] hydrocodone-acetaminophen, [START ON 3/22/2024] hydrocodone-acetaminophen, and hydrocodone-acetaminophen.      Objective:  Vitals:    01/22/24 1322   BP: (!) 182/100   Pulse: 93        Physical Exam  Vitals and nursing note reviewed.   Constitutional:       General: She is not in acute distress.     Appearance: Normal appearance. She is not ill-appearing, toxic-appearing or diaphoretic.   HENT:      Head: Normocephalic and atraumatic.      Nose: Nose normal.      Mouth/Throat:      Mouth: Mucous membranes are moist.   Eyes:      Extraocular Movements: Extraocular movements intact.      Pupils: Pupils are equal, round, and reactive to light.   Cardiovascular:      Rate and Rhythm: Normal rate and regular rhythm.      Heart sounds: Normal heart sounds.   Pulmonary:      Effort: Pulmonary effort is normal. No respiratory distress.      Breath sounds: Normal breath sounds. No stridor. No wheezing or rhonchi.   Abdominal:      General: Bowel sounds are normal.      Palpations: Abdomen is soft.   Musculoskeletal:         General: No swelling or deformity.      Cervical back: Normal and normal range of motion. No spasms or tenderness. No pain with movement. Normal range of motion.      Thoracic back:  Normal.      Lumbar back: Tenderness and bony tenderness present. No spasms. Decreased range of motion. Negative right straight leg raise test and negative left straight leg raise test. No scoliosis.      Right lower leg: No edema.      Left lower leg: No edema.      Comments: Lumbar pain with flexion, extension lateral rotation.  Bilateral SI joint tenderness to palpation   Skin:     General: Skin is warm.   Neurological:      General: No focal deficit present.      Mental Status: She is alert and oriented to person, place, and time. Mental status is at baseline.      Cranial Nerves: No cranial nerve deficit.      Sensory: Sensation is intact. No sensory deficit.      Motor: No weakness.      Coordination: Coordination normal.      Gait: Gait normal.      Deep Tendon Reflexes: Reflexes are normal and symmetric.   Psychiatric:         Mood and Affect: Mood normal.         Behavior: Behavior normal.           Assessment:      1. Encounter for long-term (current) use of other medications    2. Lumbosacral radiculopathy    3. Back muscle spasm    4. Cervical radiculopathy          Plan:  1. reviewed  2.Addiction, Dependency, Tolerance, Opioid abuse-misuse, Death, Diversion Discussed. Overdose reversal drug Naloxone discussed. Patient is prescribed opiates for chronic nonmalignant pain pathology.  Patient is receiving opiates which require greater than a 72 hour supply of therapy.  Patient was educated on potential dependency associated with long-term opioid use as well as decreasing efficacy with prolonged use.  Patient was advised of risks, benefits and side effects and how to utilize each medication.  Patient was also informed that any deviation from therapy protocol will  lead to discontinuation of opiates.  It is reasonable to prescribe opioid analgesics for patient based on positive response to opioid medications, lack of side effects and  limited aberrant behavior.    3.Refill/Continue medications for pain  control and function       Requested Prescriptions     Signed Prescriptions Disp Refills    cyclobenzaprine (FLEXERIL) 10 MG tablet 270 tablet 2     Sig: Take 1 tablet (10 mg total) by mouth every 8 (eight) hours.    gabapentin (NEURONTIN) 600 MG tablet 90 tablet 2     Sig: Take 1 tablet (600 mg total) by mouth every 8 (eight) hours.    HYDROcodone-acetaminophen (NORCO)  mg per tablet 90 tablet 0     Sig: Take 1 tablet by mouth every 8 (eight) hours as needed for Pain (pain).    HYDROcodone-acetaminophen (NORCO)  mg per tablet 90 tablet 0     Sig: Take 1 tablet by mouth every 8 (eight) hours as needed for Pain (pain).     4.Urine drug screen point of care obtained and consistent with prescribed medications and medication refill date.  Drug screen is to monitor compliance with prescribed opiates and to ensure that there was no misuse/abuse of other non-prescribed opioids or illicit drugs.  From this information I will determine if patient is suitable for opioid therapy    Orders Placed This Encounter   Procedures    POCT Urine Drug Screen ([facility])     Interpretive Information:     Negative:  No drug detected at the cut off level.   Positive:  This result represents presumptive positive for the   tested drug, other substances may yield a positive response other   than the analyte of interest. This result should be utilized for   diagnostic purpose only. Confirmation testing will be performed upon physician request only.         5.Patient defers nerve block injections, physical therapy or surgical consultation       report:  Reviewed and consistent with medication use as prescribed.      The total time spent for evaluation and management on 01/24/2024 including reviewing separately obtained history, performing a medically appropriate exam and evaluation, documenting clinical information in the health record, independently interpreting results and communicating them to the patient/family/caregiver,  and ordering medications/tests/procedures was between 30-44 minutes.    The above plan and management options were discussed at length with patient. Patient is in agreement with the above and verbalized understanding. It will be communicated with the referring physician via electronic record, fax, or mail.

## 2024-01-24 ENCOUNTER — TELEPHONE (OUTPATIENT)
Dept: PAIN MEDICINE | Facility: CLINIC | Age: 69
End: 2024-01-24
Payer: MEDICARE

## 2024-01-24 DIAGNOSIS — E78.5 DYSLIPIDEMIA: ICD-10-CM

## 2024-01-24 DIAGNOSIS — I10 PRIMARY HYPERTENSION: ICD-10-CM

## 2024-01-24 DIAGNOSIS — F41.9 ANXIETY AND DEPRESSION: ICD-10-CM

## 2024-01-24 DIAGNOSIS — F32.A ANXIETY AND DEPRESSION: ICD-10-CM

## 2024-01-24 RX ORDER — HYDROCODONE BITARTRATE AND ACETAMINOPHEN 10; 325 MG/1; MG/1
1 TABLET ORAL EVERY 8 HOURS PRN
Qty: 90 TABLET | Refills: 0 | Status: SHIPPED | OUTPATIENT
Start: 2024-01-24 | End: 2024-02-05

## 2024-01-24 NOTE — TELEPHONE ENCOUNTER
----- Message from Shabana Jacobsen sent at 1/24/2024  9:59 AM CST -----  Regarding: rx  Pt usual pharmacy is out of meds pt states [facility] in [place] has the pain med she needs pt is asking for a new rx for pain meds be [facility] to [facility] please call pt back at [phone]    I have attempted to call patient with no answer and I was not able to leave voicemail. Refill has been sent to [facility] in [place]  Patient has returned call and voices understanding.

## 2024-01-25 RX ORDER — SERTRALINE HYDROCHLORIDE 50 MG/1
50 TABLET, FILM COATED ORAL
Qty: 90 TABLET | Refills: 3 | OUTPATIENT
Start: 2024-01-25

## 2024-01-25 RX ORDER — ROSUVASTATIN CALCIUM 40 MG/1
40 TABLET, COATED ORAL NIGHTLY
Qty: 90 TABLET | Refills: 3 | OUTPATIENT
Start: 2024-01-25

## 2024-02-05 ENCOUNTER — OFFICE VISIT (OUTPATIENT)
Dept: PRIMARY CARE CLINIC | Facility: CLINIC | Age: 69
End: 2024-02-05
Payer: MEDICARE

## 2024-02-05 VITALS
BODY MASS INDEX: 28.7 KG/M2 | DIASTOLIC BLOOD PRESSURE: 103 MMHG | TEMPERATURE: 98 F | HEART RATE: 85 BPM | WEIGHT: 162 LBS | OXYGEN SATURATION: 97 % | SYSTOLIC BLOOD PRESSURE: 186 MMHG | HEIGHT: 63 IN | RESPIRATION RATE: 18 BRPM

## 2024-02-05 DIAGNOSIS — Z12.11 SCREENING FOR MALIGNANT NEOPLASM OF COLON: ICD-10-CM

## 2024-02-05 DIAGNOSIS — I10 UNCONTROLLED HYPERTENSION: Chronic | ICD-10-CM

## 2024-02-05 DIAGNOSIS — Z12.4 SCREENING FOR CERVICAL CANCER: Primary | ICD-10-CM

## 2024-02-05 DIAGNOSIS — Z76.0 MEDICATION REFILL: ICD-10-CM

## 2024-02-05 DIAGNOSIS — E87.6 HYPOKALEMIA: ICD-10-CM

## 2024-02-05 DIAGNOSIS — E78.5 DYSLIPIDEMIA: ICD-10-CM

## 2024-02-05 DIAGNOSIS — I10 PRIMARY HYPERTENSION: ICD-10-CM

## 2024-02-05 PROCEDURE — 88142 CYTOPATH C/V THIN LAYER: CPT | Mod: TC,GCY | Performed by: NURSE PRACTITIONER

## 2024-02-05 PROCEDURE — 3077F SYST BP >= 140 MM HG: CPT | Mod: ,,, | Performed by: NURSE PRACTITIONER

## 2024-02-05 PROCEDURE — 1125F AMNT PAIN NOTED PAIN PRSNT: CPT | Mod: ,,, | Performed by: NURSE PRACTITIONER

## 2024-02-05 PROCEDURE — 3008F BODY MASS INDEX DOCD: CPT | Mod: ,,, | Performed by: NURSE PRACTITIONER

## 2024-02-05 PROCEDURE — 87491 CHLMYD TRACH DNA AMP PROBE: CPT | Mod: GZ,,, | Performed by: CLINICAL MEDICAL LABORATORY

## 2024-02-05 PROCEDURE — 3288F FALL RISK ASSESSMENT DOCD: CPT | Mod: ,,, | Performed by: NURSE PRACTITIONER

## 2024-02-05 PROCEDURE — 4010F ACE/ARB THERAPY RXD/TAKEN: CPT | Mod: ,,, | Performed by: NURSE PRACTITIONER

## 2024-02-05 PROCEDURE — 87591 N.GONORRHOEAE DNA AMP PROB: CPT | Mod: GZ,,, | Performed by: CLINICAL MEDICAL LABORATORY

## 2024-02-05 PROCEDURE — 99214 OFFICE O/P EST MOD 30 MIN: CPT | Mod: ,,, | Performed by: NURSE PRACTITIONER

## 2024-02-05 PROCEDURE — 1101F PT FALLS ASSESS-DOCD LE1/YR: CPT | Mod: ,,, | Performed by: NURSE PRACTITIONER

## 2024-02-05 PROCEDURE — 1160F RVW MEDS BY RX/DR IN RCRD: CPT | Mod: ,,, | Performed by: NURSE PRACTITIONER

## 2024-02-05 PROCEDURE — 87624 HPV HI-RISK TYP POOLED RSLT: CPT | Mod: ,,, | Performed by: CLINICAL MEDICAL LABORATORY

## 2024-02-05 PROCEDURE — 3080F DIAST BP >= 90 MM HG: CPT | Mod: ,,, | Performed by: NURSE PRACTITIONER

## 2024-02-05 PROCEDURE — 1159F MED LIST DOCD IN RCRD: CPT | Mod: ,,, | Performed by: NURSE PRACTITIONER

## 2024-02-05 RX ORDER — CLOPIDOGREL BISULFATE 75 MG/1
75 TABLET ORAL DAILY
Qty: 90 TABLET | Refills: 1 | Status: SHIPPED | OUTPATIENT
Start: 2024-02-05

## 2024-02-05 RX ORDER — ROSUVASTATIN CALCIUM 40 MG/1
40 TABLET, COATED ORAL NIGHTLY
Qty: 90 TABLET | Refills: 1 | Status: SHIPPED | OUTPATIENT
Start: 2024-02-05

## 2024-02-05 RX ORDER — CARVEDILOL 12.5 MG/1
TABLET ORAL
Qty: 270 TABLET | Refills: 1 | Status: SHIPPED | OUTPATIENT
Start: 2024-02-05

## 2024-02-05 RX ORDER — LISINOPRIL AND HYDROCHLOROTHIAZIDE 20; 25 MG/1; MG/1
1 TABLET ORAL DAILY
Qty: 90 TABLET | Refills: 1 | Status: SHIPPED | OUTPATIENT
Start: 2024-02-05

## 2024-02-05 NOTE — PATIENT INSTRUCTIONS
Continue two tablets of your carvedilol in the am and start taking two at night instead of one because your blood pressure is still running too high. Return to see Dr. Mayberry to recheck your blood pressure and continue further management of regimen until yall are able to get it in goal range.  We will call you with results of pap smear when it returns.  You should hear soon about getting your colonoscopy set up for you.   Let us know if you need anything before then.

## 2024-02-05 NOTE — PROGRESS NOTES
DMITRY Lemus   [place] [address] [place]  [address]     PATIENT NAME: dAe Grant  : 1955  DATE: 24  MRN: [id number]      Billing Provider: DMITRY Lemus  Level of Service: [place] OFFICE/OUTPT VISIT, EST, LEVL IV, 30-39 MIN  Patient PCP Information       Provider PCP Type    Loni Campos MD General            Reason for Visit / Chief Complaint: Hypertension (Med refills. Would like to discuss getting pap due to not having one in several years. )       Update PCP  Update Chief Complaint         History of Present Illness / Problem Focused Workflow     Ade Grant presents to the clinic with Hypertension (Med refills. Would like to discuss getting pap due to not having one in several years. )     69-year-old female presents to a Pap smear done as well as get refills on her blood pressure medicines and have a colonoscopy set up for screening colon cancer.  She says she does not remember the last time she had a Pap smear done, that it has gone as long time ago possibly close to a decade or more, but it is unclear.  She decided to call and have this done since her mother  of cancer.  By the time they found the cancer she had mets everywhere and passed away very quickly.  She has other cancers in her family, not sure the specific ones but she wants to get up-to-date on her screenings.  She had a partial hysterectomy done 30+ years ago with her last child.  She had a mammogram done this past year and it was normal, not time to have that on set up yet.  The last time she had a colonoscopy, which she does not remember exactly when this was, they told her that she needed to have it done every 3 years because they found precancerous cells.  She knows that has been much more than 3 years, possibly 5.  Her blood pressure is significantly elevated in office, but she says she has not had her blood pressure medicines until right before she got here she went to take  them.  It sounds that it is typically not well controlled.  Says she just not check it at home.  Appears that she is following up with Cardiology sometime soon to have a repeat echo.  They found a valve issue on the last 1 several months ago and just wanting to repeat it around this time for monitoring.  She has called to reach out in make this appointment as instructed, but she is just waiting to hear back from them.       Hypertension        Review of Systems     Review of Systems   Constitutional: Negative.    HENT: Negative.     Eyes: Negative.    Respiratory: Negative.     Cardiovascular: Negative.    Gastrointestinal: Negative.    Endocrine: Negative.    Genitourinary: Negative.    Musculoskeletal: Negative.    Integumentary:  Negative.   Neurological: Negative.    Psychiatric/Behavioral: Negative.     All other systems reviewed and are negative.       Medical / Social / Family History     Past Medical History:   Diagnosis Date    Anxiety     Cervical radiculopathy     Chronic pain syndrome     COPD (chronic obstructive pulmonary disease)     Depression     Enlarged heart     GERD (gastroesophageal reflux disease)     Hyperlipidemia     Hypertension     Lumbosacral radiculopathy     Obstructive sleep apnea     Other long term (current) drug therapy     Presence of right artificial knee joint     Vitamin D deficiency        Past Surgical History:   Procedure Laterality Date    ARTHROSCOPY OF HIP Left     CAUDAL EPIDURAL STEROID INJECTION N/A 06/04/2018 2/5/2018    Dr Hawkins    ECHOCARDIOGRAM,TRANSESOPHAGEAL N/A 8/23/2023    Procedure: Transesophageal echo (SUSAN) intra-procedure log documentation;  Surgeon: Kate Ramirez MD;  Location: [facility] CATH LAB;  Service: Cardiology;  Laterality: N/A;    HYSTERECTOMY      INJECTION OF ANESTHETIC AGENT INTO SACROILIAC JOINT Bilateral 12/15/2022    Procedure: BLOCK, SACROILIAC JOINT   BILATERAL;  Surgeon: Riya Armas MD;  [facility];  Service: Pain  Management;  Laterality: Bilateral;    KNEE ARTHROSCOPY Right     LUMBAR FUSION         Social History  Ms. Grant  reports that she has been smoking cigarettes. She has never used smokeless tobacco. She reports that she does not currently use alcohol. She reports current drug use. Drug: Hydrocodone.    Family History  Ms.'s Grant family history includes Bone cancer in her brother; Cancer in her mother and another family member; Colon cancer in her brother; Heart disease in her father and mother; Hypertension in her mother; Thyroid cancer in her brother and another family member.    Medications and Allergies     Medications  No outpatient medications have been marked as taking for the 2/5/24 encounter (Office Visit) with Pratibha Nation FNP.       Allergies  Review of patient's allergies indicates:   Allergen Reactions    Fenofibrate nanocrystallized Rash       Physical Examination     Vitals:    02/05/24 1333   BP: (!) 186/103   Pulse:    Resp:    Temp:      Physical Exam  Vitals and nursing note reviewed. Exam conducted with a chaperone present.   Constitutional:       Appearance: Normal appearance.   HENT:      Head: Normocephalic and atraumatic.      Mouth/Throat:      Mouth: Mucous membranes are moist.      Pharynx: Oropharynx is clear.   Eyes:      Extraocular Movements: Extraocular movements intact.      Conjunctiva/sclera: Conjunctivae normal.      Pupils: Pupils are equal, round, and reactive to light.   Cardiovascular:      Rate and Rhythm: Normal rate and regular rhythm.      Pulses: Normal pulses.      Heart sounds: Normal heart sounds.   Pulmonary:      Effort: Pulmonary effort is normal.      Breath sounds: Normal breath sounds.   Abdominal:      General: Abdomen is flat. Bowel sounds are normal. There is no distension.      Palpations: Abdomen is soft.   Genitourinary:     General: Normal vulva.      Vagina: Normal. No bleeding or lesions.      Uterus: Absent.       Adnexa: Right adnexa normal  and left adnexa normal.        Right: No mass.          Left: No mass.     Musculoskeletal:         General: Normal range of motion.      Cervical back: Normal range of motion and neck supple.   Skin:     General: Skin is warm and dry.      Capillary Refill: Capillary refill takes less than 2 seconds.   Neurological:      General: No focal deficit present.      Mental Status: She is alert and oriented to person, place, and time. Mental status is at baseline.      GCS: GCS eye subscore is 4. GCS verbal subscore is 5. GCS motor subscore is 6.      Cranial Nerves: Cranial nerves 2-12 are intact.   Psychiatric:         Mood and Affect: Mood normal.         Behavior: Behavior normal.         Thought Content: Thought content normal.         Judgment: Judgment normal.              Assessment and Plan (including Health Maintenance)      Problem List  Smart Sets  Document Outside HM   :    Plan:   Screening for cervical cancer  -     ThinPrep Pap Test; Future; Expected date: 02/05/2024  -     HPV DNA probe, amplified  -     CT/GC, PCR (THINPREP)    Uncontrolled hypertension  -     carvediloL (COREG) 12.5 MG tablet; Take 2 tablets in the AM and 1 tablet in the PM  Dispense: 270 tablet; Refill: 1  -     lisinopriL-hydrochlorothiazide (PRINZIDE,ZESTORETIC) 20-25 mg Tab; Take 1 tablet by mouth once daily.  Dispense: 90 tablet; Refill: 1    Primary hypertension  Comments:  has not taken meds today  continue to smoke   Orders:  -     clopidogreL (PLAVIX) 75 mg tablet; Take 1 tablet (75 mg total) by mouth once daily.  Dispense: 90 tablet; Refill: 1  -     rosuvastatin (CRESTOR) 40 MG Tab; Take 1 tablet (40 mg total) by mouth nightly.  Dispense: 90 tablet; Refill: 1    Hypokalemia    Dyslipidemia  -     rosuvastatin (CRESTOR) 40 MG Tab; Take 1 tablet (40 mg total) by mouth nightly.  Dispense: 90 tablet; Refill: 1    Medication refill    Screening for malignant neoplasm of colon  -     Ambulatory referral/consult to Gastroenterology;  Future; Expected date: 02/12/2024     New patient to me, I am seeing while her PCP is out on leave. She says her bp is just this elevated due to missed dosing but typically runs slightly elevated. Appears apprehensive to increase or add to regimen. So instructed her to start taking 2 Coreg in the pm instead of one, check blood pressure at home, make log and return to follow up with PCP in a few weeks to further manage BP regimen as needed.  It appears Cardiology backed down on her Lasix to 20 mg instead of 40 mg since last visit so leaving the dose at 20 mg.  F/u with cardiology for repeat echo as planned.   Pap smear sent off.  GI referral to set up colonoscopy as requested. Requesting to have it done at [facility].       Health Maintenance Due   Topic Date Due    COVID-19 Vaccine (1) Never done    TETANUS VACCINE  Never done    Hemoglobin A1c (Diabetic Prevention Screening)  Never done    Colorectal Cancer Screening  Never done    Shingles Vaccine (1 of 2) Never done    RSV Vaccine (Age 60+ and Pregnant patients) (1 - 1-dose 60+ series) Never done    Pneumococcal Vaccines (Age 65+) (2 of 2 - PCV) 03/17/2023    DEXA Scan  08/28/2023    Influenza Vaccine (1) 09/01/2023    Mammogram  04/18/2024       Problem List Items Addressed This Visit          Cardiac/Vascular    Uncontrolled hypertension (Chronic)    Overview     has not taken meds today  continue to smoke          Relevant Medications    carvediloL (COREG) 12.5 MG tablet    clopidogreL (PLAVIX) 75 mg tablet    lisinopriL-hydrochlorothiazide (PRINZIDE,ZESTORETIC) 20-25 mg Tab    rosuvastatin (CRESTOR) 40 MG Tab     Other Visit Diagnoses       Screening for cervical cancer    -  Primary    Relevant Orders    ThinPrep Pap Test (Completed)    HPV DNA probe, amplified (Completed)    CT/GC, PCR (THINPREP) (Completed)    Primary hypertension        has not taken meds today  continue to smoke     Relevant Medications    clopidogreL (PLAVIX) 75 mg tablet     rosuvastatin (CRESTOR) 40 MG Tab    Hypokalemia        Dyslipidemia        Relevant Medications    rosuvastatin (CRESTOR) 40 MG Tab    Medication refill        Screening for malignant neoplasm of colon        Relevant Orders    Ambulatory referral/consult to Gastroenterology            Health Maintenance Topics with due status: Not Due       Topic Last Completion Date    Lipid Panel 04/12/2023       Future Appointments   Date Time Provider Department Center   3/19/2024 10:15 AM Kate Ramirez MD [facility]   4/22/2024  1:00 PM Mary, [facility]        Patient Instructions   Continue two tablets of your carvedilol in the am and start taking two at night instead of one because your blood pressure is still running too high. Return to see Dr. Mayberry to recheck your blood pressure and continue further management of regimen until yall are able to get it in goal range.  We will call you with results of pap smear when it returns.  You should hear soon about getting your colonoscopy set up for you.   Let us know if you need anything before then.     Follow up in about 1 month (around 3/5/2024) for W/PCP for f/u on BP, med management.     Signature:  DMITRY Lemus      Date of encounter: 2/5/24

## 2024-02-06 ENCOUNTER — TELEPHONE (OUTPATIENT)
Dept: CARDIOLOGY | Facility: CLINIC | Age: 69
End: 2024-02-06
Payer: MEDICARE

## 2024-02-06 DIAGNOSIS — I34.0 MODERATE MITRAL REGURGITATION: Primary | ICD-10-CM

## 2024-02-06 LAB
GH SERPL-MCNC: NORMAL NG/ML
INSULIN SERPL-ACNC: NORMAL U[IU]/ML
LAB AP CLINICAL INFORMATION: NORMAL
LAB AP GYN INTERPRETATION: NEGATIVE
LAB AP PAP DISCLAIMER COMMENTS: NORMAL
RENIN PLAS-CCNC: NORMAL NG/ML/H

## 2024-02-08 LAB
CHLAMYDIA BY PCR: NEGATIVE
HPV 16: NEGATIVE
HPV 18: NEGATIVE
HPV OTHER: NEGATIVE
N. GONORRHOEAE (GC) BY PCR: NEGATIVE

## 2024-02-19 ENCOUNTER — HOSPITAL ENCOUNTER (OUTPATIENT)
Dept: CARDIOLOGY | Facility: HOSPITAL | Age: 69
Discharge: HOME | End: 2024-02-19
Attending: STUDENT IN AN ORGANIZED HEALTH CARE EDUCATION/TRAINING PROGRAM
Payer: MEDICARE

## 2024-02-19 DIAGNOSIS — I34.0 MODERATE MITRAL REGURGITATION: ICD-10-CM

## 2024-02-19 PROCEDURE — 93306 TTE W/DOPPLER COMPLETE: CPT | Mod: 26,,, | Performed by: STUDENT IN AN ORGANIZED HEALTH CARE EDUCATION/TRAINING PROGRAM

## 2024-02-19 PROCEDURE — 93306 TTE W/DOPPLER COMPLETE: CPT

## 2024-02-21 RX ORDER — FUROSEMIDE 20 MG/1
20 TABLET ORAL DAILY
Qty: 90 TABLET | Refills: 0 | Status: SHIPPED | OUTPATIENT
Start: 2024-02-21 | End: 2024-05-21

## 2024-02-22 LAB
AORTIC ROOT ANNULUS: 2.81 CM
AORTIC VALVE CUSP SEPERATION: 1.62 CM
AV INDEX (PROSTH): 0.87
AV MEAN GRADIENT: 5 MMHG
AV PEAK GRADIENT: 10 MMHG
AV VALVE AREA BY VELOCITY RATIO: 2.36 CM²
AV VALVE AREA: 2.63 CM²
AV VELOCITY RATIO: 0.78
CV ECHO LV RWT: 0.93 CM
DOP CALC AO PEAK VEL: 1.56 M/S
DOP CALC AO VTI: 29.8 CM
DOP CALC LVOT AREA: 3 CM2
DOP CALC LVOT DIAMETER: 1.96 CM
DOP CALC LVOT PEAK VEL: 1.22 M/S
DOP CALC LVOT STROKE VOLUME: 78.41 CM3
DOP CALC MV VTI: 33.3 CM
DOP CALCLVOT PEAK VEL VTI: 26 CM
E WAVE DECELERATION TIME: 200.46 MSEC
E/A RATIO: 0.43
E/E' RATIO: 10.25 M/S
ECHO LV POSTERIOR WALL: 1.41 CM (ref 0.6–1.1)
FRACTIONAL SHORTENING: 4 % (ref 28–44)
INTERVENTRICULAR SEPTUM: 1.36 CM (ref 0.6–1.1)
IVC DIAMETER: 0.86 CM
LA MAJOR: 2.18 CM
LEFT ATRIUM SIZE: 2 CM
LEFT INTERNAL DIMENSION IN SYSTOLE: 2.9 CM (ref 2.1–4)
LEFT VENTRICLE DIASTOLIC VOLUME: 35.88 ML
LEFT VENTRICLE SYSTOLIC VOLUME: 32.13 ML
LEFT VENTRICULAR INTERNAL DIMENSION IN DIASTOLE: 3.03 CM (ref 3.5–6)
LEFT VENTRICULAR MASS: 139.79 G
LV LATERAL E/E' RATIO: 10.25 M/S
LV SEPTAL E/E' RATIO: 10.25 M/S
LVOT MG: 2.67 MMHG
LVOT MV: 0.71 CM/S
MV MEAN GRADIENT: 3 MMHG
MV PEAK A VEL: 0.96 M/S
MV PEAK E VEL: 0.41 M/S
MV PEAK GRADIENT: 8 MMHG
MV STENOSIS PRESSURE HALF TIME: 58.13 MS
MV VALVE AREA BY CONTINUITY EQUATION: 2.35 CM2
MV VALVE AREA P 1/2 METHOD: 3.78 CM2
OHS LV EJECTION FRACTION SIMPSONS BIPLANE MOD: 60 %
PISA MRMAX VEL: 5.79 M/S
PISA TR MAX VEL: 2.66 M/S
PV PEAK GRADIENT: 4 MMHG
PV PEAK VELOCITY: 0.94 M/S
RA MAJOR: 3.46 CM
RA PRESSURE ESTIMATED: 3 MMHG
RIGHT VENTRICULAR END-DIASTOLIC DIMENSION: 3.78 CM
RV TB RVSP: 6 MMHG
TDI LATERAL: 0.04 M/S
TDI SEPTAL: 0.04 M/S
TDI: 0.04 M/S
TR MAX PG: 28 MMHG
TRICUSPID ANNULAR PLANE SYSTOLIC EXCURSION: 1.78 CM
TV REST PULMONARY ARTERY PRESSURE: 31 MMHG

## 2024-02-23 ENCOUNTER — TELEPHONE (OUTPATIENT)
Dept: CARDIOLOGY | Facility: CLINIC | Age: 69
End: 2024-02-23
Payer: MEDICARE

## 2024-02-23 NOTE — TELEPHONE ENCOUNTER
----- Message from Kate Ramirez MD sent at 2/22/2024  3:40 PM CST -----  Echo unchanged. Can you change her appt to early April 2024. Thanks.

## 2024-04-15 NOTE — PROGRESS NOTES
Subjective:         Patient ID: Ade Grant is a 69 y.o. female.    Chief Complaint: Low-back Pain      Pain  This is a chronic problem. The current episode started more than 1 year ago. The problem occurs daily. The problem has been unchanged. Associated symptoms include arthralgias, myalgias and neck pain. Pertinent negatives include no anorexia, chest pain, chills, coughing, diaphoresis, fever, sore throat, urinary symptoms, vertigo, visual change or vomiting.     Review of Systems   Constitutional:  Negative for activity change, appetite change, chills, diaphoresis, fever and unexpected weight change.   HENT:  Negative for drooling, ear discharge, ear pain, facial swelling, mouth sores, nosebleeds, sore throat, trouble swallowing, voice change and goiter.    Eyes:  Negative for photophobia, pain, discharge, redness and visual disturbance.   Respiratory:  Negative for apnea, cough, choking, chest tightness, shortness of breath, wheezing and stridor.    Cardiovascular:  Negative for chest pain, palpitations and leg swelling.   Gastrointestinal:  Negative for abdominal distention, anorexia, diarrhea, rectal pain, vomiting and fecal incontinence.   Endocrine: Negative for cold intolerance, heat intolerance, polydipsia, polyphagia and polyuria.   Genitourinary:  Negative for bladder incontinence, dysuria, flank pain, frequency and hot flashes.   Musculoskeletal:  Positive for arthralgias, back pain, leg pain, myalgias and neck pain.   Integumentary:  Negative for color change and pallor.   Allergic/Immunologic: Negative for immunocompromised state.   Neurological:  Negative for dizziness, vertigo, seizures, syncope, facial asymmetry, speech difficulty, light-headedness, memory loss and coordination difficulties.   Hematological:  Negative for adenopathy. Does not bruise/bleed easily.   Psychiatric/Behavioral:  Negative for agitation, behavioral problems, confusion, decreased concentration, dysphoric mood,  hallucinations, self-injury and suicidal ideas. The patient is not nervous/anxious and is not hyperactive.            Past Medical History:   Diagnosis Date    Anxiety     Cervical radiculopathy     Chronic pain syndrome     COPD (chronic obstructive pulmonary disease)     Depression     Enlarged heart     GERD (gastroesophageal reflux disease)     Hyperlipidemia     Hypertension     Lumbosacral radiculopathy     Obstructive sleep apnea     Other long term (current) drug therapy     Presence of right artificial knee joint     Vitamin D deficiency      Past Surgical History:   Procedure Laterality Date    ARTHROSCOPY OF HIP Left     CAUDAL EPIDURAL STEROID INJECTION N/A 06/04/2018 2/5/2018    Dr Hawkins    ECHOCARDIOGRAM,TRANSESOPHAGEAL N/A 8/23/2023    Procedure: Transesophageal echo (SUSAN) intra-procedure log documentation;  Surgeon: Kate Ramirez MD;  Location: [facility] CATH LAB;  Service: Cardiology;  Laterality: N/A;    HYSTERECTOMY      INJECTION OF ANESTHETIC AGENT INTO SACROILIAC JOINT Bilateral 12/15/2022    Procedure: BLOCK, SACROILIAC JOINT   BILATERAL;  Surgeon: Riya Armas MD;  Location: [facility] PAIN MGMT;  Service: Pain Management;  Laterality: Bilateral;    KNEE ARTHROSCOPY Right     LUMBAR FUSION       Social History     Socioeconomic History    Marital status:     Number of children: 3   Tobacco Use    Smoking status: Every Day     Current packs/day: 1.00     Types: Cigarettes    Smokeless tobacco: Never   Substance and Sexual Activity    Alcohol use: Not Currently    Drug use: Yes     Types: Hydrocodone    Sexual activity: Not Currently     Family History   Problem Relation Name Age of Onset    Cancer Mother      Heart disease Mother      Hypertension Mother      Heart disease Father      Thyroid cancer Brother      Bone cancer Brother      Colon cancer Brother      Thyroid cancer Other aunt     Cancer Other uncle      Review of patient's allergies  "indicates:   Allergen Reactions    Fenofibrate nanocrystallized Rash        Objective:  Vitals:    04/22/24 1331   BP: (!) 134/90   Pulse: 81   Resp: 18   Weight: 72.6 kg (160 lb)   Height: 5' 3" (1.6 m)   PainSc:   6           Physical Exam  Vitals and nursing note reviewed. Exam conducted with a chaperone present.   Constitutional:       General: She is awake. She is not in acute distress.     Appearance: Normal appearance. She is not ill-appearing or toxic-appearing.   HENT:      Head: Normocephalic and atraumatic.      Nose: Nose normal.      Mouth/Throat:      Mouth: Mucous membranes are moist.      Pharynx: Oropharynx is clear.   Eyes:      Conjunctiva/sclera: Conjunctivae normal.      Pupils: Pupils are equal, round, and reactive to light.   Cardiovascular:      Rate and Rhythm: Normal rate.   Pulmonary:      Effort: Pulmonary effort is normal. No respiratory distress.   Abdominal:      Palpations: Abdomen is soft.      Tenderness: There is no guarding.   Musculoskeletal:         General: Normal range of motion.      Cervical back: Normal range of motion and neck supple. Tenderness present. No rigidity.      Thoracic back: Tenderness present.      Lumbar back: Tenderness present.   Skin:     General: Skin is warm and dry.      Coloration: Skin is not jaundiced or pale.   Neurological:      General: No focal deficit present.      Mental Status: She is alert and oriented to person, place, and time. Mental status is at baseline.      Cranial Nerves: No cranial nerve deficit (II-XII).   Psychiatric:         Mood and Affect: Mood normal.         Behavior: Behavior normal. Behavior is cooperative.         Thought Content: Thought content normal.         Echo    Left Ventricle: The left ventricle is normal in size. Increased wall   thickness. There is normal systolic function. Biplane (2D) method of discs   ejection fraction is 60%. There is normal diastolic function.    Right Ventricle: Normal right ventricular " cavity size. Systolic   function is normal.    Aortic Valve: The aortic valve is a trileaflet valve. Mildly calcified   cusps.    Mitral Valve: There is mild bileaflet sclerosis. Mildly thickened   leaflets. There is no stenosis. The mean pressure gradient across the   mitral valve is 3 mmHg at a heart rate of  bpm. There is moderate to   severe regurgitation with an eccentric jet.    Tricuspid Valve: There is mild regurgitation.    Pulmonary Artery: The estimated pulmonary artery systolic pressure is   31 mmHg.    IVC/SVC: Normal venous pressure at 3 mmHg.       Hospital Outpatient Visit on 02/19/2024   Component Date Value Ref Range Status    LVOT stroke volume 02/19/2024 78.41  cm3 Final    LVIDd 02/19/2024 3.03 (A)  3.5 - 6.0 cm Final    LV Systolic Volume 02/19/2024 32.13  mL Final    LVIDs 02/19/2024 2.90  2.1 - 4.0 cm Final    LV Diastolic Volume 02/19/2024 35.88  mL Final    IVS 02/19/2024 1.36 (A)  0.6 - 1.1 cm Final    LVOT diameter 02/19/2024 1.96  cm Final    LVOT area 02/19/2024 3.0  cm2 Final    FS 02/19/2024 4 (A)  28 - 44 % Final    Left Ventricle Relative Wall Thick* 02/19/2024 0.93  cm Final    Posterior Wall 02/19/2024 1.41 (A)  0.6 - 1.1 cm Final    LV mass 02/19/2024 139.79  g Final    MV Peak E Valerio 02/19/2024 0.41  m/s Final    TDI LATERAL 02/19/2024 0.04  m/s Final    TDI SEPTAL 02/19/2024 0.04  m/s Final    E/E' ratio 02/19/2024 10.25  m/s Final    MV Peak A Valerio 02/19/2024 0.96  m/s Final    TR Max Valerio 02/19/2024 2.66  m/s Final    E/A ratio 02/19/2024 0.43   Final    E wave deceleration time 02/19/2024 200.46  msec Final    LV SEPTAL E/E' RATIO 02/19/2024 10.25  m/s Final    LV LATERAL E/E' RATIO 02/19/2024 10.25  m/s Final    LVOT peak valerio 02/19/2024 1.22  m/s Final    Left Ventricular Outflow Tract Christine* 02/19/2024 0.71  cm/s Final    Left Ventricular Outflow Tract Christine* 02/19/2024 2.67  mmHg Final    RVDD 02/19/2024 3.78  cm Final    TAPSE 02/19/2024 1.78  cm  Final    LA size 02/19/2024 2.0  cm Final    Left Atrium Major Axis 02/19/2024 2.18  cm Final    RA Major Axis 02/19/2024 3.46  cm Final    AV mean gradient 02/19/2024 5  mmHg Final    AV peak gradient 02/19/2024 10  mmHg Final    Ao peak shiva 02/19/2024 1.56  m/s Final    Ao VTI 02/19/2024 29.80  cm Final    LVOT peak VTI 02/19/2024 26.00  cm Final    AV valve area 02/19/2024 2.63  cm² Final    AV Velocity Ratio 02/19/2024 0.78   Final    AV index (prosthetic) 02/19/2024 0.87   Final    ORIANA by Velocity Ratio 02/19/2024 2.36  cm² Final    Mr max shiva 02/19/2024 5.79  m/s Final    MV mean gradient 02/19/2024 3  mmHg Final    MV peak gradient 02/19/2024 8  mmHg Final    MV stenosis pressure 1/2 time 02/19/2024 58.13  ms Final    MV valve area p 1/2 method 02/19/2024 3.78  cm2 Final    MV valve area by continuity eq 02/19/2024 2.35  cm2 Final    MV VTI 02/19/2024 33.3  cm Final    Triscuspid Valve Regurgitation Pea* 02/19/2024 28  mmHg Final    PV PEAK VELOCITY 02/19/2024 0.94  m/s Final    PV peak gradient 02/19/2024 4  mmHg Final    Ao root annulus 02/19/2024 2.81  cm Final    IVC diameter 02/19/2024 0.86  cm Final    Mean e' 02/19/2024 0.04  m/s Final    AORTIC VALVE CUSP SEPERATION 02/19/2024 1.62  cm Final    TV resting pulmonary artery pressu* 02/19/2024 31  mmHg Final    RV TB RVSP 02/19/2024 6  mmHg Final    Est. RA pres 02/19/2024 3  mmHg Final    Cole's Biplane MOD Ejection Fra* 02/19/2024 60  % Final   Office Visit on 02/05/2024   Component Date Value Ref Range Status    Case Report 02/05/2024    Final                    Value:Pap Cytology                                      Case: [id number]                                   Authorizing Provider:  Pratibha Nation FNP         Collected:           02/05/2024 02:25 PM          Ordering Location:     [facility]       Received:            02/05/2024 02:25 PM                                 [facility]                                                             First Screen:          Mae Hearn CT(ASCP)                                                   Specimen:    Liquid-Based Pap Test, Screening, Cervix                                                   Interpretation 02/05/2024 Negative              Final    General Categorization 02/05/2024 Negative for intraepithelial lesion or malignancy   Final    Specimen Adequacy 02/05/2024 Satisfactory for evaluation   Final    Clinical Information 02/05/2024    Final                    Value:This result contains rich text formatting which cannot be displayed here.    Disclaimer 02/05/2024    Final                    Value:This result contains rich text formatting which cannot be displayed here.    HPV 16 02/05/2024 Negative  Negative, Invalid Final    HPV 18 02/05/2024 Negative  Negative, Invalid Final    HPV Other 02/05/2024 Negative  Negative, Invalid Final    Chlamydia by PCR 02/05/2024 Negative  Negative, Invalid Final    N. gonorrhoeae (GC) by PCR 02/05/2024 Negative  Negative, Invalid Final   Office Visit on 01/22/2024   Component Date Value Ref Range Status    POC Amphetamines 01/22/2024 Negative  Negative, Inconclusive Final    POC Barbiturates 01/22/2024 Negative  Negative, Inconclusive Final    POC Benzodiazepines 01/22/2024 Negative  Negative, Inconclusive Final    POC Cocaine 01/22/2024 Negative  Negative, Inconclusive Final    POC THC 01/22/2024 Negative  Negative, Inconclusive Final    POC Methadone 01/22/2024 Negative  Negative, Inconclusive Final    POC Methamphetamine 01/22/2024 Negative  Negative, Inconclusive Final    POC Opiates 01/22/2024 Presumptive Positive (A)  Negative, Inconclusive Final    POC Oxycodone 01/22/2024 Negative  Negative, Inconclusive Final    POC Phencyclidine 01/22/2024 Negative  Negative, Inconclusive Final    POC Methylenedioxymethamphetamine * 01/22/2024 Negative  Negative, Inconclusive Final    POC Tricyclic  Antidepressants 01/22/2024 Negative  Negative, Inconclusive Final    POC Buprenorphine 01/22/2024 Negative   Final     Acceptable 01/22/2024 Yes   Final    POC Temperature (Urine) 01/22/2024 92   Final   Office Visit on 10/23/2023   Component Date Value Ref Range Status    POC Amphetamines 10/23/2023 Negative  Negative, Inconclusive Final    POC Barbiturates 10/23/2023 Negative  Negative, Inconclusive Final    POC Benzodiazepines 10/23/2023 Negative  Negative, Inconclusive Final    POC Cocaine 10/23/2023 Negative  Negative, Inconclusive Final    POC THC 10/23/2023 Negative  Negative, Inconclusive Final    POC Methadone 10/23/2023 Negative  Negative, Inconclusive Final    POC Methamphetamine 10/23/2023 Negative  Negative, Inconclusive Final    POC Opiates 10/23/2023 Presumptive Positive (A)  Negative, Inconclusive Final    POC Oxycodone 10/23/2023 Negative  Negative, Inconclusive Final    POC Phencyclidine 10/23/2023 Negative  Negative, Inconclusive Final    POC Methylenedioxymethamphetamine * 10/23/2023 Negative  Negative, Inconclusive Final    POC Tricyclic Antidepressants 10/23/2023 Negative  Negative, Inconclusive Final    POC Buprenorphine 10/23/2023 Negative   Final     Acceptable 10/23/2023 Yes   Final    POC Temperature (Urine) 10/23/2023 94   Final         Orders Placed This Encounter   Procedures    POCT Urine Drug Screen Presump     Interpretive Information:     Negative:  No drug detected at the cut off level.   Positive:  This result represents presumptive positive for the   tested drug, other substances may yield a positive response other   than the analyte of interest. This result should be utilized for   diagnostic purpose only. Confirmation testing will be performed upon physician request only.          Requested Prescriptions     Signed Prescriptions Disp Refills    cyclobenzaprine (FLEXERIL) 10 MG tablet 270 tablet 2     Sig: Take 1 tablet (10 mg  total) by mouth every 8 (eight) hours.    gabapentin (NEURONTIN) 600 MG tablet 90 tablet 2     Sig: Take 1 tablet (600 mg total) by mouth every 8 (eight) hours.    HYDROcodone-acetaminophen (NORCO)  mg per tablet 90 tablet 0     Sig: Take 1 tablet by mouth every 8 (eight) hours as needed for Pain.    HYDROcodone-acetaminophen (NORCO)  mg per tablet 90 tablet 0     Sig: Take 1 tablet by mouth every 8 (eight) hours as needed for Pain.    HYDROcodone-acetaminophen (NORCO)  mg per tablet 90 tablet 0     Sig: Take 1 tablet by mouth every 8 (eight) hours as needed for Pain.       Assessment:     1. Lumbosacral radiculopathy    2. Back muscle spasm    3. Cervical radiculopathy    4. Encounter for long-term (current) use of other medications           A's of Opioid Risk Assessment  Activity:Patient can perform ADL.   Analgesia:Patients pain is partially controlled by current medication. Patient has tried OTC medications such as Tylenol and Ibuprofen with out relief.   Adverse Effects: Patient denies constipation or sedation.  Aberrant Behavior:  reviewed with no aberrant drug seeking/taking behavior.  Overdose reversal drug naloxone discussed    Drug screen reviewed      Plan:    Narcan January 2023     History lumbar surgery x2 L4 through S1 lumbar fusion Dr. Villeda      She had bilateral sacroiliac injection # 1 December 15, 2022, patient states she had 90% relief after procedure, procedure did help improve her level of function     She states current medications helping control her discomfort she has no new complaints    She would like to continue conservative management     Continue current medication    Follow-up 3 months     Dr. Armas, January 2025    Bring original prescription medication bottles/container/box with labels to each visit

## 2024-04-22 ENCOUNTER — OFFICE VISIT (OUTPATIENT)
Dept: PAIN MEDICINE | Facility: CLINIC | Age: 69
End: 2024-04-22
Payer: MEDICARE

## 2024-04-22 VITALS
HEIGHT: 63 IN | SYSTOLIC BLOOD PRESSURE: 134 MMHG | RESPIRATION RATE: 18 BRPM | HEART RATE: 81 BPM | DIASTOLIC BLOOD PRESSURE: 90 MMHG | WEIGHT: 160 LBS | BODY MASS INDEX: 28.35 KG/M2

## 2024-04-22 DIAGNOSIS — Z79.899 ENCOUNTER FOR LONG-TERM (CURRENT) USE OF OTHER MEDICATIONS: ICD-10-CM

## 2024-04-22 DIAGNOSIS — M54.12 CERVICAL RADICULOPATHY: Chronic | ICD-10-CM

## 2024-04-22 DIAGNOSIS — M54.17 LUMBOSACRAL RADICULOPATHY: Primary | Chronic | ICD-10-CM

## 2024-04-22 DIAGNOSIS — M62.830 BACK MUSCLE SPASM: ICD-10-CM

## 2024-04-22 PROCEDURE — 1159F MED LIST DOCD IN RCRD: CPT | Mod: CPTII,,, | Performed by: PHYSICIAN ASSISTANT

## 2024-04-22 PROCEDURE — 99999PBSHW POCT URINE DRUG SCREEN PRESUMP: Mod: PBBFAC,,,

## 2024-04-22 PROCEDURE — 3008F BODY MASS INDEX DOCD: CPT | Mod: CPTII,,, | Performed by: PHYSICIAN ASSISTANT

## 2024-04-22 PROCEDURE — 3288F FALL RISK ASSESSMENT DOCD: CPT | Mod: CPTII,,, | Performed by: PHYSICIAN ASSISTANT

## 2024-04-22 PROCEDURE — 1125F AMNT PAIN NOTED PAIN PRSNT: CPT | Mod: CPTII,,, | Performed by: PHYSICIAN ASSISTANT

## 2024-04-22 PROCEDURE — 3075F SYST BP GE 130 - 139MM HG: CPT | Mod: CPTII,,, | Performed by: PHYSICIAN ASSISTANT

## 2024-04-22 PROCEDURE — 4010F ACE/ARB THERAPY RXD/TAKEN: CPT | Mod: CPTII,,, | Performed by: PHYSICIAN ASSISTANT

## 2024-04-22 PROCEDURE — 80305 DRUG TEST PRSMV DIR OPT OBS: CPT | Mod: PBBFAC | Performed by: PHYSICIAN ASSISTANT

## 2024-04-22 PROCEDURE — 99214 OFFICE O/P EST MOD 30 MIN: CPT | Mod: S$PBB,,, | Performed by: PHYSICIAN ASSISTANT

## 2024-04-22 PROCEDURE — 3080F DIAST BP >= 90 MM HG: CPT | Mod: CPTII,,, | Performed by: PHYSICIAN ASSISTANT

## 2024-04-22 PROCEDURE — 99214 OFFICE O/P EST MOD 30 MIN: CPT | Mod: PBBFAC | Performed by: PHYSICIAN ASSISTANT

## 2024-04-22 PROCEDURE — 1101F PT FALLS ASSESS-DOCD LE1/YR: CPT | Mod: CPTII,,, | Performed by: PHYSICIAN ASSISTANT

## 2024-04-22 RX ORDER — HYDROCODONE BITARTRATE AND ACETAMINOPHEN 10; 325 MG/1; MG/1
1 TABLET ORAL EVERY 8 HOURS PRN
Qty: 90 TABLET | Refills: 0 | Status: SHIPPED | OUTPATIENT
Start: 2024-06-25 | End: 2024-04-22

## 2024-04-22 RX ORDER — CYCLOBENZAPRINE HCL 10 MG
10 TABLET ORAL EVERY 8 HOURS
Qty: 270 TABLET | Refills: 2 | Status: SHIPPED | OUTPATIENT
Start: 2024-04-22

## 2024-04-22 RX ORDER — HYDROCODONE BITARTRATE AND ACETAMINOPHEN 10; 325 MG/1; MG/1
1 TABLET ORAL EVERY 8 HOURS PRN
Qty: 90 TABLET | Refills: 0 | Status: SHIPPED | OUTPATIENT
Start: 2024-06-25 | End: 2024-05-29

## 2024-04-22 RX ORDER — HYDROCODONE BITARTRATE AND ACETAMINOPHEN 10; 325 MG/1; MG/1
1 TABLET ORAL EVERY 8 HOURS PRN
Qty: 90 TABLET | Refills: 0 | Status: SHIPPED | OUTPATIENT
Start: 2024-07-24 | End: 2024-04-22

## 2024-04-22 RX ORDER — HYDROCODONE BITARTRATE AND ACETAMINOPHEN 10; 325 MG/1; MG/1
1 TABLET ORAL EVERY 8 HOURS PRN
Qty: 90 TABLET | Refills: 0 | Status: SHIPPED | OUTPATIENT
Start: 2024-04-25 | End: 2024-04-22

## 2024-04-22 RX ORDER — GABAPENTIN 600 MG/1
600 TABLET ORAL EVERY 8 HOURS
Qty: 90 TABLET | Refills: 2 | Status: SHIPPED | OUTPATIENT
Start: 2024-04-22 | End: 2024-04-22

## 2024-04-22 RX ORDER — HYDROCODONE BITARTRATE AND ACETAMINOPHEN 10; 325 MG/1; MG/1
1 TABLET ORAL EVERY 8 HOURS PRN
Qty: 90 TABLET | Refills: 0 | Status: SHIPPED | OUTPATIENT
Start: 2024-04-25 | End: 2024-05-29

## 2024-04-22 RX ORDER — CYCLOBENZAPRINE HCL 10 MG
10 TABLET ORAL EVERY 8 HOURS
Qty: 270 TABLET | Refills: 2 | Status: SHIPPED | OUTPATIENT
Start: 2024-04-22 | End: 2024-04-22

## 2024-04-22 RX ORDER — GABAPENTIN 600 MG/1
600 TABLET ORAL EVERY 8 HOURS
Qty: 90 TABLET | Refills: 2 | Status: SHIPPED | OUTPATIENT
Start: 2024-04-22

## 2024-04-22 RX ORDER — HYDROCODONE BITARTRATE AND ACETAMINOPHEN 10; 325 MG/1; MG/1
1 TABLET ORAL EVERY 8 HOURS PRN
Qty: 90 TABLET | Refills: 0 | Status: SHIPPED | OUTPATIENT
Start: 2024-07-24 | End: 2024-05-29

## 2024-04-25 ENCOUNTER — OFFICE VISIT (OUTPATIENT)
Dept: CARDIOLOGY | Facility: CLINIC | Age: 69
End: 2024-04-25
Payer: MEDICARE

## 2024-04-25 VITALS
SYSTOLIC BLOOD PRESSURE: 130 MMHG | WEIGHT: 161 LBS | HEART RATE: 97 BPM | DIASTOLIC BLOOD PRESSURE: 90 MMHG | OXYGEN SATURATION: 97 % | BODY MASS INDEX: 28.53 KG/M2 | HEIGHT: 63 IN

## 2024-04-25 DIAGNOSIS — R07.9 CHEST PAIN, UNSPECIFIED TYPE: ICD-10-CM

## 2024-04-25 DIAGNOSIS — I34.0 MITRAL VALVE INSUFFICIENCY, UNSPECIFIED ETIOLOGY: Primary | ICD-10-CM

## 2024-04-25 DIAGNOSIS — I10 UNCONTROLLED HYPERTENSION: Chronic | ICD-10-CM

## 2024-04-25 PROCEDURE — 93005 ELECTROCARDIOGRAM TRACING: CPT | Mod: PBBFAC | Performed by: STUDENT IN AN ORGANIZED HEALTH CARE EDUCATION/TRAINING PROGRAM

## 2024-04-25 PROCEDURE — 99215 OFFICE O/P EST HI 40 MIN: CPT | Mod: PBBFAC,25 | Performed by: STUDENT IN AN ORGANIZED HEALTH CARE EDUCATION/TRAINING PROGRAM

## 2024-04-25 PROCEDURE — 3288F FALL RISK ASSESSMENT DOCD: CPT | Mod: CPTII,,, | Performed by: STUDENT IN AN ORGANIZED HEALTH CARE EDUCATION/TRAINING PROGRAM

## 2024-04-25 PROCEDURE — 1101F PT FALLS ASSESS-DOCD LE1/YR: CPT | Mod: CPTII,,, | Performed by: STUDENT IN AN ORGANIZED HEALTH CARE EDUCATION/TRAINING PROGRAM

## 2024-04-25 PROCEDURE — 1159F MED LIST DOCD IN RCRD: CPT | Mod: CPTII,,, | Performed by: STUDENT IN AN ORGANIZED HEALTH CARE EDUCATION/TRAINING PROGRAM

## 2024-04-25 PROCEDURE — 3080F DIAST BP >= 90 MM HG: CPT | Mod: CPTII,,, | Performed by: STUDENT IN AN ORGANIZED HEALTH CARE EDUCATION/TRAINING PROGRAM

## 2024-04-25 PROCEDURE — 99214 OFFICE O/P EST MOD 30 MIN: CPT | Mod: S$PBB,,, | Performed by: STUDENT IN AN ORGANIZED HEALTH CARE EDUCATION/TRAINING PROGRAM

## 2024-04-25 PROCEDURE — 4010F ACE/ARB THERAPY RXD/TAKEN: CPT | Mod: CPTII,,, | Performed by: STUDENT IN AN ORGANIZED HEALTH CARE EDUCATION/TRAINING PROGRAM

## 2024-04-25 PROCEDURE — 3075F SYST BP GE 130 - 139MM HG: CPT | Mod: CPTII,,, | Performed by: STUDENT IN AN ORGANIZED HEALTH CARE EDUCATION/TRAINING PROGRAM

## 2024-04-25 PROCEDURE — 3008F BODY MASS INDEX DOCD: CPT | Mod: CPTII,,, | Performed by: STUDENT IN AN ORGANIZED HEALTH CARE EDUCATION/TRAINING PROGRAM

## 2024-04-25 PROCEDURE — 93010 ELECTROCARDIOGRAM REPORT: CPT | Mod: S$PBB,,, | Performed by: STUDENT IN AN ORGANIZED HEALTH CARE EDUCATION/TRAINING PROGRAM

## 2024-04-25 NOTE — PROGRESS NOTES
PCP: Loni Campos MD    Referring Provider: Loni Dominguez MD    Subjective:   Ade Grant is a 69 y.o. female with hx of hypertension who presents for a follow-up visit.     4/25/2024: Presents for scheduled follow-up. Endorses central chest heaviness over the last few months. Notes she had 1-2 episodes/week. It happens both with exertion and at rest. Complains of shortness of breath w/ exertion- unchanged from before. No orthopnea, PND, leg swelling, palpitations, lightheadedness or syncope.    09/06/2023:  Presents for follow-up after SUSAN.  Complaints of occasional shortness of breath with exertion.  Otherwise doing well.     07/20/2023: Presents for follow-up. Endorses dyspnea on exertion. ROS positive for orthopnea. No leg swelling.    05/01/2023: Complains of chest pain going on for years. Last saw a cardiologist in [place], [place] about 5-6 years ago who has since retired as well. Complains of central chest pain which sometimes feels like heart burn. Also complains of dyspnea on exertion- ongoing for several years, no echo at least in the last 5 years.  Does not recall having had a stress test or a cardiac catheterization in the past.  She is on lasix 40 mg daily- started recently by Dr. Dominguez.    Fhx: Mother had CABG at around 65 also had PAD, older brother had CABG in his 40s     Shx: Active smoker - 1 PPD for 54 pack years     EKG 4/25/24: NSR, 1st degree AV block, nonspecific IVCD, T wave abnormality consider lateral ischemia 8/23/23:  NSR, 1st degree AV block, LBBB   5/1/23: NSR, 1st degree AV block, LBBB     ECHO     Results for orders placed during the hospital encounter of 02/19/24    Echo    Interpretation Summary    Left Ventricle: The left ventricle is normal in size. Increased wall thickness. There is normal systolic function. Biplane (2D) method of discs ejection fraction is 60%. There is normal diastolic function.    Right Ventricle: Normal right ventricular cavity size. Systolic function is  normal.    Aortic Valve: The aortic valve is a trileaflet valve. Mildly calcified cusps.    Mitral Valve: There is mild bileaflet sclerosis. Mildly thickened leaflets. There is no stenosis. The mean pressure gradient across the mitral valve is 3 mmHg at a heart rate of  bpm. There is moderate to severe regurgitation with an eccentric jet.    Tricuspid Valve: There is mild regurgitation.    Pulmonary Artery: The estimated pulmonary artery systolic pressure is 31 mmHg.    IVC/SVC: Normal venous pressure at 3 mmHg.    Results for orders placed during the hospital encounter of 05/19/23    Echo    Interpretation Summary  · The left ventricle is normal in size with normal systolic function.  · The estimated ejection fraction is 55%.  · Normal left ventricular diastolic function.  · Atrial fibrillation not observed.  · Normal right ventricular size with normal right ventricular systolic function.  · Moderate-to-severe mitral regurgitation.  · Mild aortic regurgitation.  · Normal central venous pressure (3 mmHg).  · The estimated PA systolic pressure is 24 mmHg.       CATH: No results found for this or any previous visit.      NM MPI 5/19/23:  Impression:     1.  Scintigraphically negative for ischemia or infarct.  2. the global left ventricular systolic function is normal with an LV ejection fraction of 90 % and no evidence of LV dilatation. Wall motion is normal.     Lab Results   Component Value Date     07/20/2023    K 3.9 07/20/2023     07/20/2023    CO2 29 07/20/2023    BUN 15 07/20/2023    CREATININE 0.72 07/20/2023    CALCIUM 9.4 07/20/2023    ANIONGAP 8 07/20/2023    EGFRNONAA 74 03/17/2022       Lab Results   Component Value Date    CHOL 84 04/12/2023    CHOL 285 (H) 11/16/2022    CHOL 97 03/17/2022     Lab Results   Component Value Date    HDL 31 (L) 04/12/2023    HDL 31 (L) 11/16/2022    HDL 31 (L) 03/17/2022     Lab Results   Component Value Date    LDLCALC 17 04/12/2023    LDLCALC 196 11/16/2022     LDLCALC 23 03/17/2022     Lab Results   Component Value Date    TRIG 182 (H) 04/12/2023    TRIG 290 (H) 11/16/2022    TRIG 213 (H) 03/17/2022     Lab Results   Component Value Date    CHOLHDL 2.7 04/12/2023    CHOLHDL 9.2 11/16/2022    CHOLHDL 3.1 03/17/2022       Lab Results   Component Value Date    WBC 8.89 03/17/2022    HGB 15.7 03/17/2022    HCT 47.6 (H) 03/17/2022    MCV 90.2 03/17/2022     03/17/2022           Current Outpatient Medications:     blood pressure test kit-large Kit, 1 kit by Misc.(Non-Drug; Combo Route) route once daily., Disp: 1 each, Rfl: 0    carvediloL (COREG) 12.5 MG tablet, Take 2 tablets in the AM and 1 tablet in the PM, Disp: 270 tablet, Rfl: 1    cetirizine (ZYRTEC) 10 mg Cap, Take by mouth., Disp: , Rfl:     citalopram (CELEXA) 40 MG tablet, Take 40 mg by mouth once daily., Disp: , Rfl:     clopidogreL (PLAVIX) 75 mg tablet, Take 1 tablet (75 mg total) by mouth once daily., Disp: 90 tablet, Rfl: 1    cyclobenzaprine (FLEXERIL) 10 MG tablet, Take 1 tablet (10 mg total) by mouth every 8 (eight) hours., Disp: 270 tablet, Rfl: 2    furosemide (LASIX) 20 MG tablet, Take 1 tablet (20 mg total) by mouth once daily., Disp: 90 tablet, Rfl: 0    gabapentin (NEURONTIN) 600 MG tablet, Take 1 tablet (600 mg total) by mouth every 8 (eight) hours., Disp: 90 tablet, Rfl: 2    [START ON 6/25/2024] HYDROcodone-acetaminophen (NORCO)  mg per tablet, Take 1 tablet by mouth every 8 (eight) hours as needed for Pain., Disp: 90 tablet, Rfl: 0    [START ON 7/24/2024] HYDROcodone-acetaminophen (NORCO)  mg per tablet, Take 1 tablet by mouth every 8 (eight) hours as needed for Pain., Disp: 90 tablet, Rfl: 0    HYDROcodone-acetaminophen (NORCO)  mg per tablet, Take 1 tablet by mouth every 8 (eight) hours as needed for Pain., Disp: 90 tablet, Rfl: 0    lisinopriL-hydrochlorothiazide (PRINZIDE,ZESTORETIC) 20-25 mg Tab, Take 1 tablet by mouth once daily., Disp: 90 tablet, Rfl: 1    loratadine  "(CLARITIN) 10 mg tablet, Take 1 tablet (10 mg total) by mouth once daily., Disp: 90 tablet, Rfl: 1    montelukast (SINGULAIR) 10 mg tablet, TAKE 1 TABLET (10 MG TOTAL) BY MOUTH EVERY EVENING., Disp: 90 tablet, Rfl: 10    potassium chloride SA (K-DUR,KLOR-CON) 20 MEQ tablet, TAKE 1 TABLET ONE TIME DAILY, Disp: 90 tablet, Rfl: 10    rosuvastatin (CRESTOR) 40 MG Tab, Take 1 tablet (40 mg total) by mouth nightly., Disp: 90 tablet, Rfl: 1    sertraline (ZOLOFT) 50 MG tablet, Take 1 tablet (50 mg total) by mouth once daily., Disp: 90 tablet, Rfl: 1    Review of Systems   Constitutional:  Negative for chills, diaphoresis, fever and malaise/fatigue.   Respiratory:  Positive for shortness of breath. Negative for cough.    Cardiovascular:  Positive for chest pain. Negative for palpitations, orthopnea, claudication, leg swelling and PND.   Gastrointestinal:  Negative for abdominal pain, heartburn, nausea and vomiting.   Neurological:  Negative for dizziness.         Objective:   BP (!) 130/90 (BP Location: Left arm, Patient Position: Sitting)   Pulse 97   Ht 5' 3" (1.6 m)   Wt 73 kg (161 lb)   SpO2 97%   BMI 28.52 kg/m²     Physical Exam  Constitutional:       General: She is not in acute distress.     Appearance: Normal appearance.   Cardiovascular:      Rate and Rhythm: Normal rate and regular rhythm.      Pulses: Normal pulses.      Heart sounds: Normal heart sounds. No murmur heard.     No friction rub. No gallop.   Pulmonary:      Effort: Pulmonary effort is normal.      Breath sounds: Normal breath sounds. No wheezing or rales.   Musculoskeletal:      Right lower leg: No edema.      Left lower leg: No edema.   Skin:     General: Skin is warm and dry.   Neurological:      Mental Status: She is alert.           Assessment:     1. Mitral valve insufficiency, unspecified etiology  NT-Pro Natriuretic Peptide    Basic Metabolic Panel    CTA Cardiac    CTA Cardiac      2. Uncontrolled hypertension  EKG 12-lead    EKG " 12-lead      3. Chest pain, unspecified type  CTA Cardiac    CTA Cardiac                Plan:   No problem-specific Assessment & Plan notes found for this encounter.    Moderate-to-severe MR   Normal LVEF and normal LV size.  Last echo 2/19/24 (unchanged from prior). Plan for repeat imaging (preferable cardiac MRI)  6 months from prior- approximately 8/2024  Check BMP, proBNP today    Chest pain   Risk factors: DM, HTN, current heavy smoking, FH  NM MPI negative for ischemia or infarct   Given persistence of chest pressure and several risk factors of CAD, proceed with Coronary CTA for evaluation of coronary anatomy and assess for obstructive coronary disease.     Follow-up in 3 months     .

## 2024-05-02 LAB
OHS QRS DURATION: 128 MS
OHS QTC CALCULATION: 521 MS

## 2024-05-29 RX ORDER — HYDROCODONE BITARTRATE AND ACETAMINOPHEN 10; 325 MG/1; MG/1
1 TABLET ORAL EVERY 8 HOURS PRN
Qty: 90 TABLET | Refills: 0 | Status: SHIPPED | OUTPATIENT
Start: 2024-05-29

## 2024-05-29 RX ORDER — HYDROCODONE BITARTRATE AND ACETAMINOPHEN 10; 325 MG/1; MG/1
1 TABLET ORAL EVERY 8 HOURS PRN
Qty: 90 TABLET | Refills: 0 | Status: SHIPPED | OUTPATIENT
Start: 2024-06-28

## 2024-07-24 NOTE — PROGRESS NOTES
PCP: Coni, Primary Doctor    Referring Provider: Loni Dominguez MD    Subjective:   Ade Grant is a 69 y.o. female with hx of hypertension who presents for a follow-up visit.     7/25/24: CCTA was scheduled but pt unable to go as she notes she is unable to go to [facility]. Endorses intermittent dyspnea on exertion, occasional orthpnea and episodes of chest pain,    4/25/2024: Presents for scheduled follow-up. Endorses central chest heaviness over the last few months. Notes she had 1-2 episodes/week. It happens both with exertion and at rest. Complains of shortness of breath w/ exertion- unchanged from before. No orthopnea, PND, leg swelling, palpitations, lightheadedness or syncope.    09/06/2023:  Presents for follow-up after SUSAN.  Complaints of occasional shortness of breath with exertion.  Otherwise doing well.     07/20/2023: Presents for follow-up. Endorses dyspnea on exertion. ROS positive for orthopnea. No leg swelling.    05/01/2023: Complains of chest pain going on for years. Last saw a cardiologist in [place], [place] about 5-6 years ago who has since retired as well. Complains of central chest pain which sometimes feels like heart burn. Also complains of dyspnea on exertion- ongoing for several years, no echo at least in the last 5 years.  Does not recall having had a stress test or a cardiac catheterization in the past.  She is on lasix 40 mg daily- started recently by Dr. Dominguez.    Fhx: Mother had CABG at around 65 also had PAD, older brother had CABG in his 40s   Shx: Active smoker - 1 PPD for 54 pack years     EKG 4/25/24: NSR, 1st degree AV block, nonspecific IVCD, T wave abnormality consider lateral ischemia 8/23/23:  NSR, 1st degree AV block, LBBB   5/1/23: NSR, 1st degree AV block, LBBB     ECHO     Results for orders placed during the hospital encounter of 02/19/24    Echo    Interpretation Summary    Left Ventricle: The left ventricle is normal in size. Increased wall thickness. There is normal  systolic function. Biplane (2D) method of discs ejection fraction is 60%. There is normal diastolic function.    Right Ventricle: Normal right ventricular cavity size. Systolic function is normal.    Aortic Valve: The aortic valve is a trileaflet valve. Mildly calcified cusps.    Mitral Valve: There is mild bileaflet sclerosis. Mildly thickened leaflets. There is no stenosis. The mean pressure gradient across the mitral valve is 3 mmHg at a heart rate of  bpm. There is moderate to severe regurgitation with an eccentric jet.    Tricuspid Valve: There is mild regurgitation.    Pulmonary Artery: The estimated pulmonary artery systolic pressure is 31 mmHg.    IVC/SVC: Normal venous pressure at 3 mmHg.    Results for orders placed during the hospital encounter of 05/19/23    Echo    Interpretation Summary  · The left ventricle is normal in size with normal systolic function.  · The estimated ejection fraction is 55%.  · Normal left ventricular diastolic function.  · Atrial fibrillation not observed.  · Normal right ventricular size with normal right ventricular systolic function.  · Moderate-to-severe mitral regurgitation.  · Mild aortic regurgitation.  · Normal central venous pressure (3 mmHg).  · The estimated PA systolic pressure is 24 mmHg.       CATH: No results found for this or any previous visit.      NM MPI 5/19/23:  Impression:     1.  Scintigraphically negative for ischemia or infarct.  2. the global left ventricular systolic function is normal with an LV ejection fraction of 90 % and no evidence of LV dilatation. Wall motion is normal.     Lab Results   Component Value Date     07/25/2024    K 3.7 07/25/2024     07/25/2024    CO2 29 07/25/2024    BUN 17 07/25/2024    CREATININE 0.66 07/25/2024    CALCIUM 9.4 07/25/2024    ANIONGAP 12 07/25/2024    EGFRNONAA 74 03/17/2022       Lab Results   Component Value Date    CHOL 84 04/12/2023    CHOL 285 (H) 11/16/2022    CHOL 97 03/17/2022     Lab Results    Component Value Date    HDL 31 (L) 04/12/2023    HDL 31 (L) 11/16/2022    HDL 31 (L) 03/17/2022     Lab Results   Component Value Date    LDLCALC 17 04/12/2023    LDLCALC 196 11/16/2022    LDLCALC 23 03/17/2022     Lab Results   Component Value Date    TRIG 182 (H) 04/12/2023    TRIG 290 (H) 11/16/2022    TRIG 213 (H) 03/17/2022     Lab Results   Component Value Date    CHOLHDL 2.7 04/12/2023    CHOLHDL 9.2 11/16/2022    CHOLHDL 3.1 03/17/2022       Lab Results   Component Value Date    WBC 8.89 03/17/2022    HGB 15.7 03/17/2022    HCT 47.6 (H) 03/17/2022    MCV 90.2 03/17/2022     03/17/2022           Current Outpatient Medications:     blood pressure test kit-large Kit, 1 kit by Misc.(Non-Drug; Combo Route) route once daily., Disp: 1 each, Rfl: 0    carvediloL (COREG) 12.5 MG tablet, Take 2 tablets in the AM and 1 tablet in the PM, Disp: 270 tablet, Rfl: 1    cetirizine (ZYRTEC) 10 mg Cap, Take by mouth., Disp: , Rfl:     citalopram (CELEXA) 40 MG tablet, Take 40 mg by mouth once daily., Disp: , Rfl:     clopidogreL (PLAVIX) 75 mg tablet, Take 1 tablet (75 mg total) by mouth once daily., Disp: 90 tablet, Rfl: 1    cyclobenzaprine (FLEXERIL) 10 MG tablet, Take 1 tablet (10 mg total) by mouth every 8 (eight) hours., Disp: 270 tablet, Rfl: 2    furosemide (LASIX) 20 MG tablet, Take 1 tablet (20 mg total) by mouth once daily., Disp: 90 tablet, Rfl: 0    gabapentin (NEURONTIN) 600 MG tablet, Take 1 tablet (600 mg total) by mouth every 8 (eight) hours., Disp: 90 tablet, Rfl: 2    HYDROcodone-acetaminophen (NORCO)  mg per tablet, Take 1 tablet by mouth every 8 (eight) hours as needed for Pain., Disp: 90 tablet, Rfl: 0    HYDROcodone-acetaminophen (NORCO)  mg per tablet, Take 1 tablet by mouth every 8 (eight) hours as needed for Pain., Disp: 90 tablet, Rfl: 0    lisinopriL-hydrochlorothiazide (PRINZIDE,ZESTORETIC) 20-25 mg Tab, Take 1 tablet by mouth once daily., Disp: 90 tablet, Rfl: 1    loratadine  "(CLARITIN) 10 mg tablet, Take 1 tablet (10 mg total) by mouth once daily., Disp: 90 tablet, Rfl: 1    montelukast (SINGULAIR) 10 mg tablet, TAKE 1 TABLET (10 MG TOTAL) BY MOUTH EVERY EVENING., Disp: 90 tablet, Rfl: 10    potassium chloride SA (K-DUR,KLOR-CON) 20 MEQ tablet, TAKE 1 TABLET ONE TIME DAILY, Disp: 90 tablet, Rfl: 10    rosuvastatin (CRESTOR) 40 MG Tab, Take 1 tablet (40 mg total) by mouth nightly., Disp: 90 tablet, Rfl: 1    sertraline (ZOLOFT) 50 MG tablet, Take 1 tablet (50 mg total) by mouth once daily., Disp: 90 tablet, Rfl: 1    Review of Systems   Constitutional:  Negative for chills, diaphoresis, fever and malaise/fatigue.   Respiratory:  Positive for shortness of breath. Negative for cough.    Cardiovascular:  Positive for chest pain. Negative for palpitations, orthopnea, claudication, leg swelling and PND.   Gastrointestinal:  Negative for abdominal pain, heartburn, nausea and vomiting.   Neurological:  Negative for dizziness.         Objective:   /70 (BP Location: Left arm, Patient Position: Sitting)   Pulse 70   Ht 5' 3" (1.6 m)   Wt 72.2 kg (159 lb 3.2 oz)   SpO2 99%   BMI 28.20 kg/m²     Physical Exam  Constitutional:       General: She is not in acute distress.     Appearance: Normal appearance.   Cardiovascular:      Rate and Rhythm: Normal rate and regular rhythm.      Pulses: Normal pulses.      Heart sounds: Normal heart sounds. No murmur heard.     No friction rub. No gallop.   Pulmonary:      Effort: Pulmonary effort is normal.      Breath sounds: Normal breath sounds. No wheezing or rales.   Musculoskeletal:      Right lower leg: No edema.      Left lower leg: No edema.   Skin:     General: Skin is warm and dry.   Neurological:      Mental Status: She is alert.           Assessment:     1. Moderate mitral regurgitation  Basic Metabolic Panel    NT-Pro Natriuretic Peptide    MRI Cardiac Morphology Function W WO    Cardiac MRI Morphology                  Plan:   No " problem-specific Assessment & Plan notes found for this encounter.    Moderate-to-severe MR   Normal LVEF and normal LV size.  SUSAN moderate MR   Last echo 2/19/24 (unchanged from prior).   Schedule cardiac MRI for further evaluation of mitral regurgiation  Check BMP, proBNP today    Chest pain   Risk factors: DM, HTN, current heavy smoking, FH  NM MPI negative for ischemia or infarct   Pt canceled CCTA due to transport issues. Hold off for now.     Follow-up in 3 months     .

## 2024-07-25 ENCOUNTER — OFFICE VISIT (OUTPATIENT)
Dept: CARDIOLOGY | Facility: CLINIC | Age: 69
End: 2024-07-25
Payer: MEDICARE

## 2024-07-25 VITALS
HEIGHT: 63 IN | OXYGEN SATURATION: 99 % | HEART RATE: 70 BPM | SYSTOLIC BLOOD PRESSURE: 102 MMHG | WEIGHT: 159.19 LBS | BODY MASS INDEX: 28.21 KG/M2 | DIASTOLIC BLOOD PRESSURE: 70 MMHG

## 2024-07-25 DIAGNOSIS — I34.0 MODERATE MITRAL REGURGITATION: Primary | ICD-10-CM

## 2024-07-25 PROCEDURE — 3288F FALL RISK ASSESSMENT DOCD: CPT | Mod: CPTII,,, | Performed by: STUDENT IN AN ORGANIZED HEALTH CARE EDUCATION/TRAINING PROGRAM

## 2024-07-25 PROCEDURE — 99214 OFFICE O/P EST MOD 30 MIN: CPT | Mod: S$PBB,,, | Performed by: STUDENT IN AN ORGANIZED HEALTH CARE EDUCATION/TRAINING PROGRAM

## 2024-07-25 PROCEDURE — 1159F MED LIST DOCD IN RCRD: CPT | Mod: CPTII,,, | Performed by: STUDENT IN AN ORGANIZED HEALTH CARE EDUCATION/TRAINING PROGRAM

## 2024-07-25 PROCEDURE — 4010F ACE/ARB THERAPY RXD/TAKEN: CPT | Mod: CPTII,,, | Performed by: STUDENT IN AN ORGANIZED HEALTH CARE EDUCATION/TRAINING PROGRAM

## 2024-07-25 PROCEDURE — 1101F PT FALLS ASSESS-DOCD LE1/YR: CPT | Mod: CPTII,,, | Performed by: STUDENT IN AN ORGANIZED HEALTH CARE EDUCATION/TRAINING PROGRAM

## 2024-07-25 PROCEDURE — 3074F SYST BP LT 130 MM HG: CPT | Mod: CPTII,,, | Performed by: STUDENT IN AN ORGANIZED HEALTH CARE EDUCATION/TRAINING PROGRAM

## 2024-07-25 PROCEDURE — 99215 OFFICE O/P EST HI 40 MIN: CPT | Mod: PBBFAC | Performed by: STUDENT IN AN ORGANIZED HEALTH CARE EDUCATION/TRAINING PROGRAM

## 2024-07-25 PROCEDURE — 99999 PR PBB SHADOW E&M-EST. PATIENT-LVL V: CPT | Mod: PBBFAC,,, | Performed by: STUDENT IN AN ORGANIZED HEALTH CARE EDUCATION/TRAINING PROGRAM

## 2024-07-25 PROCEDURE — 3008F BODY MASS INDEX DOCD: CPT | Mod: CPTII,,, | Performed by: STUDENT IN AN ORGANIZED HEALTH CARE EDUCATION/TRAINING PROGRAM

## 2024-07-25 PROCEDURE — 3078F DIAST BP <80 MM HG: CPT | Mod: CPTII,,, | Performed by: STUDENT IN AN ORGANIZED HEALTH CARE EDUCATION/TRAINING PROGRAM

## 2024-07-29 NOTE — PROGRESS NOTES
Subjective:         Patient ID: Ade Grant is a 69 y.o. female.    Chief Complaint: Knee Pain (right)      Pain  This is a chronic problem. The current episode started more than 1 year ago. The problem occurs daily. The problem has been waxing and waning. Associated symptoms include arthralgias, myalgias and neck pain. Pertinent negatives include no anorexia, chest pain, chills, coughing, diaphoresis, fever, sore throat, urinary symptoms, vertigo, visual change or vomiting.     Review of Systems   Constitutional:  Negative for activity change, appetite change, chills, diaphoresis, fever and unexpected weight change.   HENT:  Negative for drooling, ear discharge, ear pain, facial swelling, mouth sores, nosebleeds, sore throat, trouble swallowing, voice change and goiter.    Eyes:  Negative for photophobia, pain, discharge, redness and visual disturbance.   Respiratory:  Negative for apnea, cough, choking, chest tightness, shortness of breath, wheezing and stridor.    Cardiovascular:  Negative for chest pain, palpitations and leg swelling.   Gastrointestinal:  Negative for abdominal distention, anorexia, diarrhea, rectal pain, vomiting and fecal incontinence.   Endocrine: Negative for cold intolerance, heat intolerance, polydipsia, polyphagia and polyuria.   Genitourinary:  Negative for bladder incontinence, dysuria, flank pain, frequency and hot flashes.   Musculoskeletal:  Positive for arthralgias, back pain, leg pain, myalgias and neck pain.   Integumentary:  Negative for color change and pallor.   Allergic/Immunologic: Negative for immunocompromised state.   Neurological:  Negative for dizziness, vertigo, seizures, syncope, facial asymmetry, speech difficulty, light-headedness, memory loss and coordination difficulties.   Hematological:  Negative for adenopathy. Does not bruise/bleed easily.   Psychiatric/Behavioral:  Negative for agitation, behavioral problems, confusion, decreased concentration,  dysphoric mood, hallucinations, self-injury and suicidal ideas. The patient is not nervous/anxious and is not hyperactive.            Past Medical History:   Diagnosis Date    Anxiety     Cervical radiculopathy     Chronic pain syndrome     COPD (chronic obstructive pulmonary disease)     Depression     Enlarged heart     GERD (gastroesophageal reflux disease)     Hyperlipidemia     Hypertension     Lumbosacral radiculopathy     Obstructive sleep apnea     Other long term (current) drug therapy     Presence of right artificial knee joint     Vitamin D deficiency      Past Surgical History:   Procedure Laterality Date    ARTHROSCOPY OF HIP Left     CAUDAL EPIDURAL STEROID INJECTION N/A 06/04/2018 2/5/2018    Dr Hawkins    ECHOCARDIOGRAM,TRANSESOPHAGEAL N/A 8/23/2023    Procedure: Transesophageal echo (SUSAN) intra-procedure log documentation;  Surgeon: Kate Ramirez MD;  Location: [facility] CATH LAB;  Service: Cardiology;  Laterality: N/A;    HYSTERECTOMY      INJECTION OF ANESTHETIC AGENT INTO SACROILIAC JOINT Bilateral 12/15/2022    Procedure: BLOCK, SACROILIAC JOINT   BILATERAL;  Surgeon: Riya Armas MD;  Location: [facility] PAIN MGMT;  Service: Pain Management;  Laterality: Bilateral;    KNEE ARTHROSCOPY Right     LUMBAR FUSION       Social History     Socioeconomic History    Marital status:     Number of children: 3   Tobacco Use    Smoking status: Every Day     Current packs/day: 1.00     Types: Cigarettes    Smokeless tobacco: Never   Substance and Sexual Activity    Alcohol use: Not Currently    Drug use: Yes     Types: Hydrocodone    Sexual activity: Not Currently     Family History   Problem Relation Name Age of Onset    Cancer Mother      Heart disease Mother      Hypertension Mother      Heart disease Father      Thyroid cancer Brother      Bone cancer Brother      Colon cancer Brother      Thyroid cancer Other aunt     Cancer Other uncle      Review of patient's allergies indicates:   Allergen  "Reactions    Fenofibrate nanocrystallized Rash        Objective:  Vitals:    08/01/24 1258   BP: (!) 155/94   Pulse: 79   Resp: 18   Weight: 72.1 kg (159 lb)   Height: 5' 3" (1.6 m)   PainSc:   4             Physical Exam  Vitals and nursing note reviewed. Exam conducted with a chaperone present.   Constitutional:       General: She is awake. She is not in acute distress.     Appearance: Normal appearance. She is not ill-appearing or toxic-appearing.   HENT:      Head: Normocephalic and atraumatic.      Nose: Nose normal.      Mouth/Throat:      Mouth: Mucous membranes are moist.      Pharynx: Oropharynx is clear.   Eyes:      Conjunctiva/sclera: Conjunctivae normal.      Pupils: Pupils are equal, round, and reactive to light.   Cardiovascular:      Rate and Rhythm: Normal rate.   Pulmonary:      Effort: Pulmonary effort is normal. No respiratory distress.   Abdominal:      Palpations: Abdomen is soft.      Tenderness: There is no guarding.   Musculoskeletal:         General: Normal range of motion.      Cervical back: Normal range of motion and neck supple. Tenderness present. No rigidity.      Thoracic back: Tenderness present.      Lumbar back: Tenderness present.   Skin:     General: Skin is warm and dry.      Coloration: Skin is not jaundiced or pale.   Neurological:      General: No focal deficit present.      Mental Status: She is alert and oriented to person, place, and time. Mental status is at baseline.      Cranial Nerves: No cranial nerve deficit (II-XII).   Psychiatric:         Mood and Affect: Mood normal.         Behavior: Behavior normal. Behavior is cooperative.         Thought Content: Thought content normal.           Echo    Left Ventricle: The left ventricle is normal in size. Increased wall   thickness. There is normal systolic function. Biplane (2D) method of discs   ejection fraction is 60%. There is normal diastolic function.    Right Ventricle: Normal right ventricular cavity size. Systolic "   function is normal.    Aortic Valve: The aortic valve is a trileaflet valve. Mildly calcified   cusps.    Mitral Valve: There is mild bileaflet sclerosis. Mildly thickened   leaflets. There is no stenosis. The mean pressure gradient across the   mitral valve is 3 mmHg at a heart rate of  bpm. There is moderate to   severe regurgitation with an eccentric jet.    Tricuspid Valve: There is mild regurgitation.    Pulmonary Artery: The estimated pulmonary artery systolic pressure is   31 mmHg.    IVC/SVC: Normal venous pressure at 3 mmHg.       Lab Visit on 07/25/2024   Component Date Value Ref Range Status    Sodium 07/25/2024 141  136 - 145 mmol/L Final    Potassium 07/25/2024 3.7  3.5 - 5.1 mmol/L Final    Chloride 07/25/2024 104  98 - 107 mmol/L Final    CO2 07/25/2024 29  21 - 32 mmol/L Final    Anion Gap 07/25/2024 12  7 - 16 mmol/L Final    Glucose 07/25/2024 120 (H)  74 - 106 mg/dL Final    BUN 07/25/2024 17  7 - 18 mg/dL Final    Creatinine 07/25/2024 0.66  0.55 - 1.02 mg/dL Final    BUN/Creatinine Ratio 07/25/2024 26 (H)  6 - 20 Final    Calcium 07/25/2024 9.4  8.5 - 10.1 mg/dL Final    eGFR 07/25/2024 96  >=60 mL/min/1.73m2 Final    ProBNP 07/25/2024 84  1 - 125 pg/mL Final   Office Visit on 04/25/2024   Component Date Value Ref Range Status    QRS Duration 04/25/2024 128  ms Final    [facility] QTC Calculation 04/25/2024 521  ms Final   Office Visit on 04/22/2024   Component Date Value Ref Range Status    POC Amphetamines 04/22/2024 Negative  Negative, Inconclusive Final    POC Barbiturates 04/22/2024 Negative  Negative, Inconclusive Final    POC Benzodiazepines 04/22/2024 Negative  Negative, Inconclusive Final    POC Cocaine 04/22/2024 Negative  Negative, Inconclusive Final    POC THC 04/22/2024 Negative  Negative, Inconclusive Final    POC Methadone 04/22/2024 Negative  Negative, Inconclusive Final    POC Methamphetamine 04/22/2024 Negative  Negative, Inconclusive Final    POC Opiates 04/22/2024 Presumptive  Positive (A)  Negative, Inconclusive Final    POC Oxycodone 04/22/2024 Negative  Negative, Inconclusive Final    POC Phencyclidine 04/22/2024 Negative  Negative, Inconclusive Final    POC Methylenedioxymethamphetamine * 04/22/2024 Negative  Negative, Inconclusive Final    POC Tricyclic Antidepressants 04/22/2024 Negative  Negative, Inconclusive Final    POC Buprenorphine 04/22/2024 Negative   Final     Acceptable 04/22/2024 Yes   Final    POC Temperature (Urine) 04/22/2024 90   Final   Hospital Outpatient Visit on 02/19/2024   Component Date Value Ref Range Status    LVOT stroke volume 02/19/2024 78.41  cm3 Final    LVIDd 02/19/2024 3.03 (A)  3.5 - 6.0 cm Final    LV Systolic Volume 02/19/2024 32.13  mL Final    LVIDs 02/19/2024 2.90  2.1 - 4.0 cm Final    LV Diastolic Volume 02/19/2024 35.88  mL Final    IVS 02/19/2024 1.36 (A)  0.6 - 1.1 cm Final    LVOT diameter 02/19/2024 1.96  cm Final    LVOT area 02/19/2024 3.0  cm2 Final    FS 02/19/2024 4 (A)  28 - 44 % Final    Left Ventricle Relative Wall Thick* 02/19/2024 0.93  cm Final    Posterior Wall 02/19/2024 1.41 (A)  0.6 - 1.1 cm Final    LV mass 02/19/2024 139.79  g Final    MV Peak E Valerio 02/19/2024 0.41  m/s Final    TDI LATERAL 02/19/2024 0.04  m/s Final    TDI SEPTAL 02/19/2024 0.04  m/s Final    E/E' ratio 02/19/2024 10.25  m/s Final    MV Peak A Valerio 02/19/2024 0.96  m/s Final    TR Max Valerio 02/19/2024 2.66  m/s Final    E/A ratio 02/19/2024 0.43   Final    E wave deceleration time 02/19/2024 200.46  msec Final    LV SEPTAL E/E' RATIO 02/19/2024 10.25  m/s Final    LV LATERAL E/E' RATIO 02/19/2024 10.25  m/s Final    LVOT peak valerio 02/19/2024 1.22  m/s Final    Left Ventricular Outflow Tract Christine* 02/19/2024 0.71  cm/s Final    Left Ventricular Outflow Tract Christine* 02/19/2024 2.67  mmHg Final    RVDD 02/19/2024 3.78  cm Final    TAPSE 02/19/2024 1.78  cm Final    LA size 02/19/2024 2.0  cm Final    Left Atrium Major Axis 02/19/2024 2.18  cm Final     RA Major Axis 02/19/2024 3.46  cm Final    AV mean gradient 02/19/2024 5  mmHg Final    AV peak gradient 02/19/2024 10  mmHg Final    Ao peak shiva 02/19/2024 1.56  m/s Final    Ao VTI 02/19/2024 29.80  cm Final    LVOT peak VTI 02/19/2024 26.00  cm Final    AV valve area 02/19/2024 2.63  cm² Final    AV Velocity Ratio 02/19/2024 0.78   Final    AV index (prosthetic) 02/19/2024 0.87   Final    ORIANA by Velocity Ratio 02/19/2024 2.36  cm² Final    Mr max shiva 02/19/2024 5.79  m/s Final    MV mean gradient 02/19/2024 3  mmHg Final    MV peak gradient 02/19/2024 8  mmHg Final    MV stenosis pressure 1/2 time 02/19/2024 58.13  ms Final    MV valve area p 1/2 method 02/19/2024 3.78  cm2 Final    MV valve area by continuity eq 02/19/2024 2.35  cm2 Final    MV VTI 02/19/2024 33.3  cm Final    Triscuspid Valve Regurgitation Pea* 02/19/2024 28  mmHg Final    PV PEAK VELOCITY 02/19/2024 0.94  m/s Final    PV peak gradient 02/19/2024 4  mmHg Final    Ao root annulus 02/19/2024 2.81  cm Final    IVC diameter 02/19/2024 0.86  cm Final    Mean e' 02/19/2024 0.04  m/s Final    AORTIC VALVE CUSP SEPERATION 02/19/2024 1.62  cm Final    TV resting pulmonary artery pressu* 02/19/2024 31  mmHg Final    RV TB RVSP 02/19/2024 6  mmHg Final    Est. RA pres 02/19/2024 3  mmHg Final    Cole's Biplane MOD Ejection Fra* 02/19/2024 60  % Final   Office Visit on 02/05/2024   Component Date Value Ref Range Status    Case Report 02/05/2024    Final                    Value:Pap Cytology                                      Case: [id number]                                   Authorizing Provider:  Pratibha Nation FNP         Collected:           02/05/2024 02:25 PM          Ordering Location:     [facility]       Received:            02/05/2024 02:25 PM                                 [facility]                                                            First Screen:          Mae Hearn CT(ASCP)                                                    Specimen:    Liquid-Based Pap Test, Screening, Cervix                                                   Interpretation 02/05/2024 Negative              Final    General Categorization 02/05/2024 Negative for intraepithelial lesion or malignancy   Final    Specimen Adequacy 02/05/2024 Satisfactory for evaluation   Final    Clinical Information 02/05/2024    Final                    Value:This result contains rich text formatting which cannot be displayed here.    Disclaimer 02/05/2024    Final                    Value:This result contains rich text formatting which cannot be displayed here.    HPV 16 02/05/2024 Negative  Negative, Invalid Final    HPV 18 02/05/2024 Negative  Negative, Invalid Final    HPV Other 02/05/2024 Negative  Negative, Invalid Final    Chlamydia by PCR 02/05/2024 Negative  Negative, Invalid Final    N. gonorrhoeae (GC) by PCR 02/05/2024 Negative  Negative, Invalid Final         Orders Placed This Encounter   Procedures    POCT Urine Drug Screen Presump     Interpretive Information:     Negative:  No drug detected at the cut off level.   Positive:  This result represents presumptive positive for the   tested drug, other substances may yield a positive response other   than the analyte of interest. This result should be utilized for   diagnostic purpose only. Confirmation testing will be performed upon physician request only.          Requested Prescriptions     Pending Prescriptions Disp Refills    gabapentin (NEURONTIN) 600 MG tablet 90 tablet 2     Sig: Take 1 tablet (600 mg total) by mouth every 8 (eight) hours.    HYDROcodone-acetaminophen (NORCO)  mg per tablet 90 tablet 0     Sig: Take 1 tablet by mouth every 8 (eight) hours as needed for Pain.    HYDROcodone-acetaminophen (NORCO)  mg per tablet 90 tablet 0     Sig: Take 1 tablet by mouth every 8 (eight) hours as needed for Pain.       Assessment:     1. Lumbosacral radiculopathy    2. Cervical radiculopathy     3. Back muscle spasm    4. Encounter for long-term (current) use of other medications             A's of Opioid Risk Assessment  Activity:Patient can perform ADL.   Analgesia:Patients pain is partially controlled by current medication. Patient has tried OTC medications such as Tylenol and Ibuprofen with out relief.   Adverse Effects: Patient denies constipation or sedation.  Aberrant Behavior:  reviewed with no aberrant drug seeking/taking behavior.  Overdose reversal drug naloxone discussed    Drug screen reviewed      Plan:    Narcan January 2023     History lumbar surgery x2 L4 through S1 lumbar fusion Dr. Villeda      She had bilateral sacroiliac injection # 1 December 15, 2022, patient states she had 90% relief after procedure, procedure did help improve her level of function     No new complaints     She states she is doing very well current medication does help with her discomfort    She would like to continue conservative management     Continue current medication    Follow-up 3 months     Dr. Armas, January 2025    Bring original prescription medication bottles/container/box with labels to each visit

## 2024-08-01 ENCOUNTER — OFFICE VISIT (OUTPATIENT)
Dept: PAIN MEDICINE | Facility: CLINIC | Age: 69
End: 2024-08-01
Payer: MEDICARE

## 2024-08-01 VITALS
RESPIRATION RATE: 18 BRPM | BODY MASS INDEX: 28.17 KG/M2 | WEIGHT: 159 LBS | SYSTOLIC BLOOD PRESSURE: 155 MMHG | DIASTOLIC BLOOD PRESSURE: 94 MMHG | HEART RATE: 79 BPM | HEIGHT: 63 IN

## 2024-08-01 DIAGNOSIS — M54.17 LUMBOSACRAL RADICULOPATHY: Primary | Chronic | ICD-10-CM

## 2024-08-01 DIAGNOSIS — M62.830 BACK MUSCLE SPASM: ICD-10-CM

## 2024-08-01 DIAGNOSIS — M54.12 CERVICAL RADICULOPATHY: Chronic | ICD-10-CM

## 2024-08-01 DIAGNOSIS — Z79.899 ENCOUNTER FOR LONG-TERM (CURRENT) USE OF OTHER MEDICATIONS: ICD-10-CM

## 2024-08-01 PROCEDURE — 1159F MED LIST DOCD IN RCRD: CPT | Mod: CPTII,,, | Performed by: PHYSICIAN ASSISTANT

## 2024-08-01 PROCEDURE — 99999 PR PBB SHADOW E&M-EST. PATIENT-LVL IV: CPT | Mod: PBBFAC,,, | Performed by: PHYSICIAN ASSISTANT

## 2024-08-01 PROCEDURE — 1101F PT FALLS ASSESS-DOCD LE1/YR: CPT | Mod: CPTII,,, | Performed by: PHYSICIAN ASSISTANT

## 2024-08-01 PROCEDURE — 99214 OFFICE O/P EST MOD 30 MIN: CPT | Mod: S$PBB,,, | Performed by: PHYSICIAN ASSISTANT

## 2024-08-01 PROCEDURE — 80305 DRUG TEST PRSMV DIR OPT OBS: CPT | Mod: PBBFAC | Performed by: PHYSICIAN ASSISTANT

## 2024-08-01 PROCEDURE — 3077F SYST BP >= 140 MM HG: CPT | Mod: CPTII,,, | Performed by: PHYSICIAN ASSISTANT

## 2024-08-01 PROCEDURE — 3288F FALL RISK ASSESSMENT DOCD: CPT | Mod: CPTII,,, | Performed by: PHYSICIAN ASSISTANT

## 2024-08-01 PROCEDURE — 4010F ACE/ARB THERAPY RXD/TAKEN: CPT | Mod: CPTII,,, | Performed by: PHYSICIAN ASSISTANT

## 2024-08-01 PROCEDURE — 1125F AMNT PAIN NOTED PAIN PRSNT: CPT | Mod: CPTII,,, | Performed by: PHYSICIAN ASSISTANT

## 2024-08-01 PROCEDURE — 3008F BODY MASS INDEX DOCD: CPT | Mod: CPTII,,, | Performed by: PHYSICIAN ASSISTANT

## 2024-08-01 PROCEDURE — 99214 OFFICE O/P EST MOD 30 MIN: CPT | Mod: PBBFAC | Performed by: PHYSICIAN ASSISTANT

## 2024-08-01 PROCEDURE — 3080F DIAST BP >= 90 MM HG: CPT | Mod: CPTII,,, | Performed by: PHYSICIAN ASSISTANT

## 2024-08-01 PROCEDURE — 99999PBSHW POCT URINE DRUG SCREEN PRESUMP: Mod: PBBFAC,,,

## 2024-08-01 RX ORDER — CYCLOBENZAPRINE HCL 10 MG
10 TABLET ORAL EVERY 8 HOURS
Qty: 270 TABLET | Refills: 2 | Status: SHIPPED | OUTPATIENT
Start: 2024-08-01

## 2024-08-01 RX ORDER — GABAPENTIN 600 MG/1
600 TABLET ORAL EVERY 8 HOURS
Qty: 90 TABLET | Refills: 2 | Status: SHIPPED | OUTPATIENT
Start: 2024-08-01

## 2024-08-01 RX ORDER — HYDROCODONE BITARTRATE AND ACETAMINOPHEN 10; 325 MG/1; MG/1
1 TABLET ORAL EVERY 8 HOURS PRN
Qty: 90 TABLET | Refills: 0 | Status: SHIPPED | OUTPATIENT
Start: 2024-08-01

## 2024-08-01 RX ORDER — HYDROCODONE BITARTRATE AND ACETAMINOPHEN 10; 325 MG/1; MG/1
1 TABLET ORAL EVERY 8 HOURS PRN
Qty: 90 TABLET | Refills: 0 | Status: SHIPPED | OUTPATIENT
Start: 2024-08-31

## 2024-08-01 RX ORDER — HYDROCODONE BITARTRATE AND ACETAMINOPHEN 10; 325 MG/1; MG/1
1 TABLET ORAL EVERY 8 HOURS PRN
Qty: 90 TABLET | Refills: 0 | Status: SHIPPED | OUTPATIENT
Start: 2024-09-30

## 2024-08-28 ENCOUNTER — OFFICE VISIT (OUTPATIENT)
Dept: FAMILY MEDICINE | Facility: CLINIC | Age: 69
End: 2024-08-28
Payer: MEDICARE

## 2024-08-28 VITALS
TEMPERATURE: 99 F | DIASTOLIC BLOOD PRESSURE: 90 MMHG | WEIGHT: 159 LBS | SYSTOLIC BLOOD PRESSURE: 142 MMHG | BODY MASS INDEX: 28.17 KG/M2 | HEART RATE: 76 BPM | HEIGHT: 63 IN | RESPIRATION RATE: 20 BRPM | OXYGEN SATURATION: 97 %

## 2024-08-28 DIAGNOSIS — F32.A ANXIETY AND DEPRESSION: ICD-10-CM

## 2024-08-28 DIAGNOSIS — I10 UNCONTROLLED HYPERTENSION: Chronic | ICD-10-CM

## 2024-08-28 DIAGNOSIS — J20.9 ACUTE EXACERBATION OF CHRONIC BRONCHITIS: Primary | ICD-10-CM

## 2024-08-28 DIAGNOSIS — J42 ACUTE EXACERBATION OF CHRONIC BRONCHITIS: Primary | ICD-10-CM

## 2024-08-28 DIAGNOSIS — E78.5 DYSLIPIDEMIA: ICD-10-CM

## 2024-08-28 DIAGNOSIS — I10 PRIMARY HYPERTENSION: ICD-10-CM

## 2024-08-28 DIAGNOSIS — E87.6 HYPOKALEMIA: ICD-10-CM

## 2024-08-28 DIAGNOSIS — J30.2 SEASONAL ALLERGIES: ICD-10-CM

## 2024-08-28 DIAGNOSIS — F41.9 ANXIETY AND DEPRESSION: ICD-10-CM

## 2024-08-28 PROBLEM — I50.9 HEART FAILURE, UNSPECIFIED: Status: ACTIVE | Noted: 2024-08-28

## 2024-08-28 PROBLEM — J44.9 CHRONIC OBSTRUCTIVE PULMONARY DISEASE, UNSPECIFIED: Status: ACTIVE | Noted: 2024-08-28

## 2024-08-28 RX ORDER — TRIAMCINOLONE ACETONIDE 40 MG/ML
40 INJECTION, SUSPENSION INTRA-ARTICULAR; INTRAMUSCULAR ONCE
Status: COMPLETED | OUTPATIENT
Start: 2024-08-28 | End: 2024-08-28

## 2024-08-28 RX ORDER — CLOPIDOGREL BISULFATE 75 MG/1
75 TABLET ORAL DAILY
Qty: 90 TABLET | Refills: 1 | Status: SHIPPED | OUTPATIENT
Start: 2024-08-28

## 2024-08-28 RX ORDER — LISINOPRIL AND HYDROCHLOROTHIAZIDE 20; 25 MG/1; MG/1
1 TABLET ORAL DAILY
Qty: 90 TABLET | Refills: 1 | Status: SHIPPED | OUTPATIENT
Start: 2024-08-28

## 2024-08-28 RX ORDER — DEXAMETHASONE SODIUM PHOSPHATE 4 MG/ML
4 INJECTION, SOLUTION INTRA-ARTICULAR; INTRALESIONAL; INTRAMUSCULAR; INTRAVENOUS; SOFT TISSUE
Status: COMPLETED | OUTPATIENT
Start: 2024-08-28 | End: 2024-08-28

## 2024-08-28 RX ORDER — DOXYCYCLINE HYCLATE 100 MG
100 TABLET ORAL 2 TIMES DAILY
Qty: 20 TABLET | Refills: 0 | Status: SHIPPED | OUTPATIENT
Start: 2024-08-28

## 2024-08-28 RX ORDER — CARVEDILOL 12.5 MG/1
TABLET ORAL
Qty: 270 TABLET | Refills: 1 | Status: SHIPPED | OUTPATIENT
Start: 2024-08-28

## 2024-08-28 RX ORDER — ROSUVASTATIN CALCIUM 40 MG/1
40 TABLET, COATED ORAL NIGHTLY
Qty: 90 TABLET | Refills: 1 | Status: SHIPPED | OUTPATIENT
Start: 2024-08-28

## 2024-08-28 RX ORDER — POTASSIUM CHLORIDE 20 MEQ/1
20 TABLET, EXTENDED RELEASE ORAL DAILY
Qty: 90 TABLET | Refills: 10 | Status: SHIPPED | OUTPATIENT
Start: 2024-08-28

## 2024-08-28 RX ORDER — FUROSEMIDE 20 MG/1
20 TABLET ORAL DAILY
Qty: 90 TABLET | Refills: 0 | Status: SHIPPED | OUTPATIENT
Start: 2024-08-28 | End: 2024-11-26

## 2024-08-28 RX ORDER — MONTELUKAST SODIUM 10 MG/1
10 TABLET ORAL NIGHTLY
Qty: 90 TABLET | Refills: 10 | Status: SHIPPED | OUTPATIENT
Start: 2024-08-28

## 2024-08-28 RX ORDER — SERTRALINE HYDROCHLORIDE 50 MG/1
50 TABLET, FILM COATED ORAL DAILY
Qty: 90 TABLET | Refills: 1 | Status: SHIPPED | OUTPATIENT
Start: 2024-08-28 | End: 2025-08-28

## 2024-08-28 RX ADMIN — TRIAMCINOLONE ACETONIDE 40 MG: 40 INJECTION, SUSPENSION INTRA-ARTICULAR; INTRAMUSCULAR at 02:08

## 2024-08-28 RX ADMIN — DEXAMETHASONE SODIUM PHOSPHATE 4 MG: 4 INJECTION, SOLUTION INTRA-ARTICULAR; INTRALESIONAL; INTRAMUSCULAR; INTRAVENOUS; SOFT TISSUE at 02:08

## 2024-08-28 NOTE — PROGRESS NOTES
DMITRY Medina   [facility]  [address]   [place] [address]     PATIENT NAME: Ade Grant  : 1955  DATE: 24  MRN: [id number]      Billing Provider: DMITRY Medina  Level of Service:   Patient PCP Information       Provider PCP Type    Primary Doctor No General            Reason for Visit / Chief Complaint: Cough and Establish Care         History of Present Illness / Problem Focused Workflow     Ade Grant is a 69 y.o. female presents to the clinic  as a new patient to be established.   She has heart disease with valvular disease and follows with Dr Ramirez and has MRI scheduled next month at [facility]--presuming she is checking  for valve problems or amyloidosis.  Pt is considering changing cardiologist.  She  has shortness of breath and heart pounding when she gets upset.     Pt notes she has cough that has some production for several weeks,  with hoarseness and  feels chest discomfort from coughing.   nO sore throat, no fever  noted but has had some chills.  She startes this happens every summer.   She smokes 1/2 ppd since a teenager. She does not use  inhalers due to cost.   She cannot afford the  $49 and is not interested in pt assistance program.   She is taking singulair for allergies.   She has chronic pain and is followed with Sohail Hernandez and  is no gabapentin and Norco. Which  helps.   Reports she had a stroke which affected her vision in her right eye with no weakness of extrmities.      Review of Systems     Review of Systems   Constitutional:  Negative for fatigue.   HENT:  Positive for sinus pressure/congestion. Negative for nasal congestion and sore throat.    Respiratory:  Positive for cough and wheezing. Negative for chest tightness and shortness of breath.    Cardiovascular:  Negative for chest pain, palpitations and leg swelling.   Gastrointestinal:  Negative for nausea, vomiting and reflux.   Neurological:  Negative for weakness and memory loss.    Psychiatric/Behavioral:  Negative for confusion and sleep disturbance.         Medical / Social / Family History     Past Medical History:   Diagnosis Date    Anxiety     Cervical radiculopathy     Chronic pain syndrome     COPD (chronic obstructive pulmonary disease)     Depression     Enlarged heart     GERD (gastroesophageal reflux disease)     Hyperlipidemia     Hypertension     Lumbosacral radiculopathy     Obstructive sleep apnea     Other long term (current) drug therapy     Presence of right artificial knee joint     Vitamin D deficiency        Past Surgical History:   Procedure Laterality Date    ARTHROSCOPY OF HIP Left     CAUDAL EPIDURAL STEROID INJECTION N/A 06/04/2018 2/5/2018    Dr Hawkins    ECHOCARDIOGRAM,TRANSESOPHAGEAL N/A 8/23/2023    Procedure: Transesophageal echo (SUSAN) intra-procedure log documentation;  Surgeon: Kate Ramirez MD;  Location: [facility] CATH LAB;  Service: Cardiology;  Laterality: N/A;    HYSTERECTOMY      INJECTION OF ANESTHETIC AGENT INTO SACROILIAC JOINT Bilateral 12/15/2022    Procedure: BLOCK, SACROILIAC JOINT   BILATERAL;  Surgeon: Riya Armas MD;  Location: [facility] PAIN MGMT;  Service: Pain Management;  Laterality: Bilateral;    KNEE ARTHROSCOPY Right     LUMBAR FUSION         Social History  Ms.  reports that she has been smoking cigarettes. She started smoking about 55 years ago. She has a 55.7 pack-year smoking history. She has never used smokeless tobacco. She reports that she does not currently use alcohol. She reports current drug use. Drug: Hydrocodone.    Family History  Ms.'s family history includes Bone cancer in her brother; Cancer in her mother and another family member; Colon cancer in her brother; Heart disease in her father and mother; Hypertension in her mother; Thyroid cancer in her brother and another family member.    Medications and Allergies     Medications  Outpatient Medications Marked as Taking for the 8/28/24 encounter (Office Visit) with  "Valery Reynoso, DMITRY   Medication Sig Dispense Refill    blood pressure test kit-large Kit 1 kit by Misc.(Non-Drug; Combo Route) route once daily. 1 each 0    carvediloL (COREG) 12.5 MG tablet Take 2 tablets in the AM and 1 tablet in the  tablet 1    clopidogreL (PLAVIX) 75 mg tablet Take 1 tablet (75 mg total) by mouth once daily. 90 tablet 1    cyclobenzaprine (FLEXERIL) 10 MG tablet Take 1 tablet (10 mg total) by mouth every 8 (eight) hours. 270 tablet 2    furosemide (LASIX) 20 MG tablet Take 1 tablet (20 mg total) by mouth once daily. 90 tablet 0    gabapentin (NEURONTIN) 600 MG tablet Take 1 tablet (600 mg total) by mouth every 8 (eight) hours. 90 tablet 2    [START ON 8/31/2024] HYDROcodone-acetaminophen (NORCO)  mg per tablet Take 1 tablet by mouth every 8 (eight) hours as needed for Pain. 90 tablet 0    lisinopriL-hydrochlorothiazide (PRINZIDE,ZESTORETIC) 20-25 mg Tab Take 1 tablet by mouth once daily. 90 tablet 1    montelukast (SINGULAIR) 10 mg tablet TAKE 1 TABLET (10 MG TOTAL) BY MOUTH EVERY EVENING. 90 tablet 10    potassium chloride SA (K-DUR,KLOR-CON) 20 MEQ tablet TAKE 1 TABLET ONE TIME DAILY 90 tablet 10    rosuvastatin (CRESTOR) 40 MG Tab Take 1 tablet (40 mg total) by mouth nightly. 90 tablet 1    sertraline (ZOLOFT) 50 MG tablet Take 1 tablet (50 mg total) by mouth once daily. 90 tablet 1    [DISCONTINUED] cetirizine (ZYRTEC) 10 mg Cap Take by mouth.      [DISCONTINUED] citalopram (CELEXA) 40 MG tablet Take 40 mg by mouth once daily.      [DISCONTINUED] loratadine (CLARITIN) 10 mg tablet Take 1 tablet (10 mg total) by mouth once daily. 90 tablet 1       Allergies  Review of patient's allergies indicates:   Allergen Reactions    Fenofibrate nanocrystallized Rash       Physical Examination     Vitals:    08/28/24 1306   BP: (!) 157/94   Pulse: 76   Resp: 20   Temp: 98.6 °F (37 °C)   TempSrc: Oral   SpO2: 97%   Weight: 72.1 kg (159 lb)   Height: 5' 3" (1.6 m)      Physical Exam  HENT:      " Right Ear: Tympanic membrane, ear canal and external ear normal.      Left Ear: Tympanic membrane, ear canal and external ear normal.      Nose:      Comments: Tender to palp over ethmoid  sinuses     Mouth/Throat:      Mouth: Mucous membranes are dry.      Pharynx: Posterior oropharyngeal erythema present.   Cardiovascular:      Rate and Rhythm: Normal rate and regular rhythm.      Pulses: Normal pulses.      Heart sounds: Normal heart sounds.   Pulmonary:      Effort: Pulmonary effort is normal.      Breath sounds: Normal breath sounds.   Musculoskeletal:      Right lower leg: No edema.      Left lower leg: No edema.   Lymphadenopathy:      Cervical: No cervical adenopathy.   Skin:     General: Skin is warm and dry.   Neurological:      Mental Status: She is oriented to person, place, and time.          Assessment and Plan (including Health Maintenance)     :    Plan:  will treat AECB with  Decadron/kenalog  and Rx Doxycycline 100mg bid, continue with current meds.  Will provide Trelegy inhaler  200/ inhalation to use once daily for wheezing.  Follow up if not improving.  Advised to quit smoking and states she ahs tried and not interested at present .  Follow up in one month if not improving        Health Maintenance Due   Topic Date Due    TETANUS VACCINE  Never done    Urine Drug Screen  Never done    Naloxone Prescription  Never done    Hemoglobin A1c (Diabetic Prevention Screening)  Never done    Colorectal Cancer Screening  Never done    Shingles Vaccine (1 of 2) Never done    RSV Vaccine (Age 60+ and Pregnant patients) (1 - 1-dose 60+ series) Never done    Pneumococcal Vaccines (Age 65+) (2 of 2 - PCV) 03/17/2023    DEXA Scan  08/28/2023    COVID-19 Vaccine (1 - 2023-24 season) Never done    Mammogram  04/18/2024       Problem List Items Addressed This Visit          ENT    Seasonal allergies       Cardiac/Vascular    Uncontrolled hypertension (Chronic)    Overview     has not taken meds today  continue to  smoke           Other Visit Diagnoses       Primary hypertension        has not taken meds today  continue to smoke     Hypokalemia        Dyslipidemia        Anxiety and depression            .  Uncontrolled hypertension    Primary hypertension  Comments:  has not taken meds today  continue to smoke     Seasonal allergies    Hypokalemia    Dyslipidemia    Anxiety and depression       Health Maintenance Topics with due status: Not Due       Topic Last Completion Date    Influenza Vaccine 11/16/2022    Lipid Panel 04/12/2023       Procedures     Future Appointments   Date Time Provider Department Center   10/15/2024  1:30 PM Greg Hernandez PA [facility]   11/4/2024  9:45 AM Kate Ramirez MD [facility]   12/18/2024  1:00 PM [facility] [place]        No follow-ups on file.     Signature:  DMITRY Medina    Date of encounter: 8/28/24

## 2024-09-30 ENCOUNTER — TELEPHONE (OUTPATIENT)
Dept: FAMILY MEDICINE | Facility: CLINIC | Age: 69
End: 2024-09-30
Payer: MEDICARE

## 2024-09-30 DIAGNOSIS — I50.9 HEART FAILURE, UNSPECIFIED HF CHRONICITY, UNSPECIFIED HEART FAILURE TYPE: ICD-10-CM

## 2024-09-30 DIAGNOSIS — I10 UNCONTROLLED HYPERTENSION: Primary | Chronic | ICD-10-CM

## 2024-09-30 NOTE — TELEPHONE ENCOUNTER
----- Message from Rossana sent at 9/30/2024  9:00 AM CDT -----  Regarding: return pt phone call  Pt would like for Mrs. Rasmussen to return her phone call. She would not discuss the reason with me. [phone]

## 2024-09-30 NOTE — TELEPHONE ENCOUNTER
Will refer to [facility] specialty clinic to Dr Get Redd, cardiologist, at pt request/tm

## 2024-09-30 NOTE — TELEPHONE ENCOUNTER
Pt requesting a referral to the cardiologist in [place]. States Valery knows all about it and who the DrLeticia Is and what it is for. She would like to be contacted with the appt this week.

## 2024-10-08 NOTE — PROGRESS NOTES
Subjective:         Patient ID: Ade Grant is a 69 y.o. female.    Chief Complaint: Back Pain and Neck Pain      Pain  This is a chronic problem. The current episode started more than 1 year ago. The problem occurs daily. The problem has been unchanged. Associated symptoms include arthralgias, myalgias and neck pain. Pertinent negatives include no anorexia, chest pain, chills, coughing, diaphoresis, fever, sore throat, urinary symptoms, vertigo, visual change or vomiting.     Review of Systems   Constitutional:  Negative for activity change, appetite change, chills, diaphoresis, fever and unexpected weight change.   HENT:  Negative for drooling, ear discharge, ear pain, facial swelling, mouth sores, nosebleeds, sore throat, trouble swallowing, voice change and goiter.    Eyes:  Negative for photophobia, pain, discharge, redness and visual disturbance.   Respiratory:  Negative for apnea, cough, choking, chest tightness, shortness of breath, wheezing and stridor.    Cardiovascular:  Negative for chest pain, palpitations and leg swelling.   Gastrointestinal:  Negative for abdominal distention, anorexia, diarrhea, rectal pain, vomiting and fecal incontinence.   Endocrine: Negative for cold intolerance, heat intolerance, polydipsia, polyphagia and polyuria.   Genitourinary:  Negative for bladder incontinence, dysuria, flank pain, frequency and hot flashes.   Musculoskeletal:  Positive for arthralgias, back pain, leg pain, myalgias and neck pain.   Integumentary:  Negative for color change and pallor.   Allergic/Immunologic: Negative for immunocompromised state.   Neurological:  Negative for dizziness, vertigo, seizures, syncope, facial asymmetry, speech difficulty, light-headedness, memory loss and coordination difficulties.   Hematological:  Negative for adenopathy. Does not bruise/bleed easily.   Psychiatric/Behavioral:  Negative for agitation, behavioral problems, confusion, decreased concentration, dysphoric  mood, hallucinations, self-injury and suicidal ideas. The patient is not nervous/anxious and is not hyperactive.            Past Medical History:   Diagnosis Date    Anxiety     Cervical radiculopathy     Chronic pain syndrome     COPD (chronic obstructive pulmonary disease)     Depression     Enlarged heart     GERD (gastroesophageal reflux disease)     Hyperlipidemia     Hypertension     Lumbosacral radiculopathy     Obstructive sleep apnea     Other long term (current) drug therapy     Presence of right artificial knee joint     Vitamin D deficiency      Past Surgical History:   Procedure Laterality Date    ARTHROSCOPY OF HIP Left     CAUDAL EPIDURAL STEROID INJECTION N/A 06/04/2018 2/5/2018    Dr Hawkins    ECHOCARDIOGRAM,TRANSESOPHAGEAL N/A 8/23/2023    Procedure: Transesophageal echo (SUSAN) intra-procedure log documentation;  Surgeon: Kate Ramirez MD;  Location: [facility] CATH LAB;  Service: Cardiology;  Laterality: N/A;    HYSTERECTOMY      INJECTION OF ANESTHETIC AGENT INTO SACROILIAC JOINT Bilateral 12/15/2022    Procedure: BLOCK, SACROILIAC JOINT   BILATERAL;  Surgeon: Riya Armas MD;  Location: [facility] PAIN MGMT;  Service: Pain Management;  Laterality: Bilateral;    KNEE ARTHROSCOPY Right     LUMBAR FUSION       Social History     Socioeconomic History    Marital status:     Number of children: 3   Tobacco Use    Smoking status: Every Day     Current packs/day: 1.00     Average packs/day: 1 pack/day for 55.8 years (55.8 ttl pk-yrs)     Types: Cigarettes     Start date: 1969    Smokeless tobacco: Never   Substance and Sexual Activity    Alcohol use: Not Currently    Drug use: Yes     Types: Hydrocodone    Sexual activity: Not Currently     Family History   Problem Relation Name Age of Onset    Cancer Mother      Heart disease Mother      Hypertension Mother      Heart disease Father      Thyroid cancer Brother      Bone cancer Brother      Colon cancer Brother      Thyroid cancer Other aunt      "Cancer Other uncle      Review of patient's allergies indicates:   Allergen Reactions    Fenofibrate nanocrystallized Rash        Objective:  Vitals:    10/15/24 1339   BP: (!) 150/84   Pulse: 86   Resp: 16   Weight: 72.1 kg (158 lb 15.2 oz)   Height: 5' 3" (1.6 m)   PainSc:   8               Physical Exam  Vitals and nursing note reviewed. Exam conducted with a chaperone present.   Constitutional:       General: She is awake. She is not in acute distress.     Appearance: Normal appearance. She is not ill-appearing or toxic-appearing.   HENT:      Head: Normocephalic and atraumatic.      Nose: Nose normal.      Mouth/Throat:      Mouth: Mucous membranes are moist.      Pharynx: Oropharynx is clear.   Eyes:      Conjunctiva/sclera: Conjunctivae normal.      Pupils: Pupils are equal, round, and reactive to light.   Cardiovascular:      Rate and Rhythm: Normal rate.   Pulmonary:      Effort: Pulmonary effort is normal. No respiratory distress.   Abdominal:      Palpations: Abdomen is soft.      Tenderness: There is no guarding.   Musculoskeletal:         General: Normal range of motion.      Cervical back: Normal range of motion and neck supple. Tenderness present. No rigidity.      Thoracic back: Tenderness present.      Lumbar back: Tenderness present.   Skin:     General: Skin is warm and dry.      Coloration: Skin is not jaundiced or pale.   Neurological:      General: No focal deficit present.      Mental Status: She is alert and oriented to person, place, and time. Mental status is at baseline.      Cranial Nerves: No cranial nerve deficit (II-XII).   Psychiatric:         Mood and Affect: Mood normal.         Behavior: Behavior normal. Behavior is cooperative.         Thought Content: Thought content normal.           Echo    Left Ventricle: The left ventricle is normal in size. Increased wall   thickness. There is normal systolic function. Biplane (2D) method of discs   ejection fraction is 60%. There is normal " diastolic function.    Right Ventricle: Normal right ventricular cavity size. Systolic   function is normal.    Aortic Valve: The aortic valve is a trileaflet valve. Mildly calcified   cusps.    Mitral Valve: There is mild bileaflet sclerosis. Mildly thickened   leaflets. There is no stenosis. The mean pressure gradient across the   mitral valve is 3 mmHg at a heart rate of  bpm. There is moderate to   severe regurgitation with an eccentric jet.    Tricuspid Valve: There is mild regurgitation.    Pulmonary Artery: The estimated pulmonary artery systolic pressure is   31 mmHg.    IVC/SVC: Normal venous pressure at 3 mmHg.       Office Visit on 08/01/2024   Component Date Value Ref Range Status    POC Amphetamines 08/01/2024 Negative  Negative, Inconclusive Final    POC Barbiturates 08/01/2024 Negative  Negative, Inconclusive Final    POC Benzodiazepines 08/01/2024 Negative  Negative, Inconclusive Final    POC Cocaine 08/01/2024 Negative  Negative, Inconclusive Final    POC THC 08/01/2024 Negative  Negative, Inconclusive Final    POC Methadone 08/01/2024 Negative  Negative, Inconclusive Final    POC Methamphetamine 08/01/2024 Negative  Negative, Inconclusive Final    POC Opiates 08/01/2024 Presumptive Positive (A)  Negative, Inconclusive Final    POC Oxycodone 08/01/2024 Negative  Negative, Inconclusive Final    POC Phencyclidine 08/01/2024 Negative  Negative, Inconclusive Final    POC Methylenedioxymethamphetamine * 08/01/2024 Negative  Negative, Inconclusive Final    POC Tricyclic Antidepressants 08/01/2024 Negative  Negative, Inconclusive Final    POC Buprenorphine 08/01/2024 Negative   Final     Acceptable 08/01/2024 Yes   Final    POC Temperature (Urine) 08/01/2024 94   Final   Lab Visit on 07/25/2024   Component Date Value Ref Range Status    Sodium 07/25/2024 141  136 - 145 mmol/L Final    Potassium 07/25/2024 3.7  3.5 - 5.1 mmol/L Final    Chloride 07/25/2024 104  98 - 107 mmol/L Final    CO2  07/25/2024 29  21 - 32 mmol/L Final    Anion Gap 07/25/2024 12  7 - 16 mmol/L Final    Glucose 07/25/2024 120 (H)  74 - 106 mg/dL Final    BUN 07/25/2024 17  7 - 18 mg/dL Final    Creatinine 07/25/2024 0.66  0.55 - 1.02 mg/dL Final    BUN/Creatinine Ratio 07/25/2024 26 (H)  6 - 20 Final    Calcium 07/25/2024 9.4  8.5 - 10.1 mg/dL Final    eGFR 07/25/2024 96  >=60 mL/min/1.73m2 Final    ProBNP 07/25/2024 84  1 - 125 pg/mL Final   Office Visit on 04/25/2024   Component Date Value Ref Range Status    QRS Duration 04/25/2024 128  ms Final    [facility] QTC Calculation 04/25/2024 521  ms Final   Office Visit on 04/22/2024   Component Date Value Ref Range Status    POC Amphetamines 04/22/2024 Negative  Negative, Inconclusive Final    POC Barbiturates 04/22/2024 Negative  Negative, Inconclusive Final    POC Benzodiazepines 04/22/2024 Negative  Negative, Inconclusive Final    POC Cocaine 04/22/2024 Negative  Negative, Inconclusive Final    POC THC 04/22/2024 Negative  Negative, Inconclusive Final    POC Methadone 04/22/2024 Negative  Negative, Inconclusive Final    POC Methamphetamine 04/22/2024 Negative  Negative, Inconclusive Final    POC Opiates 04/22/2024 Presumptive Positive (A)  Negative, Inconclusive Final    POC Oxycodone 04/22/2024 Negative  Negative, Inconclusive Final    POC Phencyclidine 04/22/2024 Negative  Negative, Inconclusive Final    POC Methylenedioxymethamphetamine * 04/22/2024 Negative  Negative, Inconclusive Final    POC Tricyclic Antidepressants 04/22/2024 Negative  Negative, Inconclusive Final    POC Buprenorphine 04/22/2024 Negative   Final     Acceptable 04/22/2024 Yes   Final    POC Temperature (Urine) 04/22/2024 90   Final         No orders of the defined types were placed in this encounter.      Requested Prescriptions     Signed Prescriptions Disp Refills    gabapentin (NEURONTIN) 600 MG tablet 90 tablet 2     Sig: Take 1 tablet (600 mg total) by mouth every 8 (eight) hours.     HYDROcodone-acetaminophen (NORCO)  mg per tablet 90 tablet 0     Sig: Take 1 tablet by mouth every 8 (eight) hours as needed for Pain.    HYDROcodone-acetaminophen (NORCO)  mg per tablet 90 tablet 0     Sig: Take 1 tablet by mouth every 8 (eight) hours as needed for Pain.    HYDROcodone-acetaminophen (NORCO)  mg per tablet 90 tablet 0     Sig: Take 1 tablet by mouth every 8 (eight) hours as needed for Pain.    cyclobenzaprine (FLEXERIL) 10 MG tablet 270 tablet 0     Sig: Take 1 tablet (10 mg total) by mouth 3 (three) times daily as needed for Muscle spasms.       Assessment:     1. Lumbosacral radiculopathy    2. Cervical radiculopathy    3. Back muscle spasm               A's of Opioid Risk Assessment  Activity:Patient can perform ADL.   Analgesia:Patients pain is partially controlled by current medication. Patient has tried OTC medications such as Tylenol and Ibuprofen with out relief.   Adverse Effects: Patient denies constipation or sedation.  Aberrant Behavior:  reviewed with no aberrant drug seeking/taking behavior.  Overdose reversal drug naloxone discussed    Drug screen reviewed      Plan:    Narcan January 2023     History lumbar surgery x2 L4 through S1 lumbar fusion Dr. Villeda      She had bilateral sacroiliac injection # 1 December 15, 2022, patient states she had 90% relief after procedure, procedure did help improve her level of function       She states current medications helping her chronic discomfort     She would like to continue with current medication conservative management    Continue current medication    Follow-up 3 months     Dr. Armas, January 2025    Bring original prescription medication bottles/container/box with labels to each visit

## 2024-10-15 ENCOUNTER — OFFICE VISIT (OUTPATIENT)
Dept: PAIN MEDICINE | Facility: CLINIC | Age: 69
End: 2024-10-15
Payer: MEDICARE

## 2024-10-15 VITALS
HEART RATE: 86 BPM | RESPIRATION RATE: 16 BRPM | DIASTOLIC BLOOD PRESSURE: 84 MMHG | HEIGHT: 63 IN | WEIGHT: 158.94 LBS | SYSTOLIC BLOOD PRESSURE: 150 MMHG | BODY MASS INDEX: 28.16 KG/M2

## 2024-10-15 DIAGNOSIS — M62.830 BACK MUSCLE SPASM: ICD-10-CM

## 2024-10-15 DIAGNOSIS — M54.17 LUMBOSACRAL RADICULOPATHY: Primary | Chronic | ICD-10-CM

## 2024-10-15 DIAGNOSIS — M54.12 CERVICAL RADICULOPATHY: Chronic | ICD-10-CM

## 2024-10-15 PROCEDURE — 3077F SYST BP >= 140 MM HG: CPT | Mod: CPTII,,, | Performed by: PHYSICIAN ASSISTANT

## 2024-10-15 PROCEDURE — 3079F DIAST BP 80-89 MM HG: CPT | Mod: CPTII,,, | Performed by: PHYSICIAN ASSISTANT

## 2024-10-15 PROCEDURE — 1101F PT FALLS ASSESS-DOCD LE1/YR: CPT | Mod: CPTII,,, | Performed by: PHYSICIAN ASSISTANT

## 2024-10-15 PROCEDURE — 99214 OFFICE O/P EST MOD 30 MIN: CPT | Mod: S$PBB,,, | Performed by: PHYSICIAN ASSISTANT

## 2024-10-15 PROCEDURE — 3288F FALL RISK ASSESSMENT DOCD: CPT | Mod: CPTII,,, | Performed by: PHYSICIAN ASSISTANT

## 2024-10-15 PROCEDURE — 99999 PR PBB SHADOW E&M-EST. PATIENT-LVL IV: CPT | Mod: PBBFAC,,, | Performed by: PHYSICIAN ASSISTANT

## 2024-10-15 PROCEDURE — 3008F BODY MASS INDEX DOCD: CPT | Mod: CPTII,,, | Performed by: PHYSICIAN ASSISTANT

## 2024-10-15 PROCEDURE — 1125F AMNT PAIN NOTED PAIN PRSNT: CPT | Mod: CPTII,,, | Performed by: PHYSICIAN ASSISTANT

## 2024-10-15 PROCEDURE — 1159F MED LIST DOCD IN RCRD: CPT | Mod: CPTII,,, | Performed by: PHYSICIAN ASSISTANT

## 2024-10-15 PROCEDURE — 4010F ACE/ARB THERAPY RXD/TAKEN: CPT | Mod: CPTII,,, | Performed by: PHYSICIAN ASSISTANT

## 2024-10-15 PROCEDURE — 99214 OFFICE O/P EST MOD 30 MIN: CPT | Mod: PBBFAC | Performed by: PHYSICIAN ASSISTANT

## 2024-10-15 RX ORDER — CYCLOBENZAPRINE HCL 10 MG
10 TABLET ORAL 3 TIMES DAILY PRN
Qty: 270 TABLET | Refills: 0 | Status: SHIPPED | OUTPATIENT
Start: 2024-10-15

## 2024-10-15 RX ORDER — HYDROCODONE BITARTRATE AND ACETAMINOPHEN 10; 325 MG/1; MG/1
1 TABLET ORAL EVERY 8 HOURS PRN
Qty: 90 TABLET | Refills: 0 | Status: SHIPPED | OUTPATIENT
Start: 2024-12-29

## 2024-10-15 RX ORDER — HYDROCODONE BITARTRATE AND ACETAMINOPHEN 10; 325 MG/1; MG/1
1 TABLET ORAL EVERY 8 HOURS PRN
Qty: 90 TABLET | Refills: 0 | Status: SHIPPED | OUTPATIENT
Start: 2024-11-29

## 2024-10-15 RX ORDER — GABAPENTIN 600 MG/1
600 TABLET ORAL EVERY 8 HOURS
Qty: 90 TABLET | Refills: 2 | Status: SHIPPED | OUTPATIENT
Start: 2024-10-15

## 2024-10-15 RX ORDER — HYDROCODONE BITARTRATE AND ACETAMINOPHEN 10; 325 MG/1; MG/1
1 TABLET ORAL EVERY 8 HOURS PRN
Qty: 90 TABLET | Refills: 0 | Status: SHIPPED | OUTPATIENT
Start: 2024-10-30

## 2024-10-17 DIAGNOSIS — I50.9 HEART FAILURE, UNSPECIFIED HF CHRONICITY, UNSPECIFIED HEART FAILURE TYPE: Primary | ICD-10-CM

## 2024-10-25 ENCOUNTER — TELEPHONE (OUTPATIENT)
Dept: CARDIOLOGY | Facility: CLINIC | Age: 69
End: 2024-10-25
Payer: MEDICARE

## 2024-10-25 NOTE — TELEPHONE ENCOUNTER
Patient called about [facility] results.  Report was faxed over this am.  Dr. Ramirez out, Dr. Munoz read over report.  Some abnormalities, but nothing emergent.  Patient notified and appointment made for 10/31/24.  Patient complaining of bp going up and down and being dizzy all the time.

## 2024-10-31 ENCOUNTER — OFFICE VISIT (OUTPATIENT)
Dept: CARDIOLOGY | Facility: CLINIC | Age: 69
End: 2024-10-31
Payer: MEDICARE

## 2024-10-31 VITALS
OXYGEN SATURATION: 97 % | SYSTOLIC BLOOD PRESSURE: 142 MMHG | HEART RATE: 80 BPM | BODY MASS INDEX: 27.89 KG/M2 | WEIGHT: 157.38 LBS | HEIGHT: 63 IN | DIASTOLIC BLOOD PRESSURE: 102 MMHG

## 2024-10-31 DIAGNOSIS — I42.1 HYPERTROPHIC OBSTRUCTIVE CARDIOMYOPATHY: ICD-10-CM

## 2024-10-31 DIAGNOSIS — I10 UNCONTROLLED HYPERTENSION: Primary | ICD-10-CM

## 2024-10-31 DIAGNOSIS — I73.9 CLAUDICATION: ICD-10-CM

## 2024-10-31 DIAGNOSIS — I08.0 MITRAL VALVE INSUFFICIENCY AND AORTIC VALVE INSUFFICIENCY: ICD-10-CM

## 2024-10-31 DIAGNOSIS — R55 SYNCOPE AND COLLAPSE: ICD-10-CM

## 2024-10-31 PROCEDURE — 4010F ACE/ARB THERAPY RXD/TAKEN: CPT | Mod: CPTII,,, | Performed by: STUDENT IN AN ORGANIZED HEALTH CARE EDUCATION/TRAINING PROGRAM

## 2024-10-31 PROCEDURE — 1126F AMNT PAIN NOTED NONE PRSNT: CPT | Mod: CPTII,,, | Performed by: STUDENT IN AN ORGANIZED HEALTH CARE EDUCATION/TRAINING PROGRAM

## 2024-10-31 PROCEDURE — 1159F MED LIST DOCD IN RCRD: CPT | Mod: CPTII,,, | Performed by: STUDENT IN AN ORGANIZED HEALTH CARE EDUCATION/TRAINING PROGRAM

## 2024-10-31 PROCEDURE — 3077F SYST BP >= 140 MM HG: CPT | Mod: CPTII,,, | Performed by: STUDENT IN AN ORGANIZED HEALTH CARE EDUCATION/TRAINING PROGRAM

## 2024-10-31 PROCEDURE — 3080F DIAST BP >= 90 MM HG: CPT | Mod: CPTII,,, | Performed by: STUDENT IN AN ORGANIZED HEALTH CARE EDUCATION/TRAINING PROGRAM

## 2024-10-31 PROCEDURE — 1100F PTFALLS ASSESS-DOCD GE2>/YR: CPT | Mod: CPTII,,, | Performed by: STUDENT IN AN ORGANIZED HEALTH CARE EDUCATION/TRAINING PROGRAM

## 2024-10-31 PROCEDURE — 3008F BODY MASS INDEX DOCD: CPT | Mod: CPTII,,, | Performed by: STUDENT IN AN ORGANIZED HEALTH CARE EDUCATION/TRAINING PROGRAM

## 2024-10-31 PROCEDURE — 99214 OFFICE O/P EST MOD 30 MIN: CPT | Mod: S$PBB,,, | Performed by: STUDENT IN AN ORGANIZED HEALTH CARE EDUCATION/TRAINING PROGRAM

## 2024-10-31 PROCEDURE — 99215 OFFICE O/P EST HI 40 MIN: CPT | Mod: PBBFAC,25 | Performed by: STUDENT IN AN ORGANIZED HEALTH CARE EDUCATION/TRAINING PROGRAM

## 2024-10-31 PROCEDURE — 93005 ELECTROCARDIOGRAM TRACING: CPT | Mod: PBBFAC | Performed by: STUDENT IN AN ORGANIZED HEALTH CARE EDUCATION/TRAINING PROGRAM

## 2024-10-31 PROCEDURE — 3288F FALL RISK ASSESSMENT DOCD: CPT | Mod: CPTII,,, | Performed by: STUDENT IN AN ORGANIZED HEALTH CARE EDUCATION/TRAINING PROGRAM

## 2024-10-31 PROCEDURE — 93010 ELECTROCARDIOGRAM REPORT: CPT | Mod: S$PBB,,, | Performed by: STUDENT IN AN ORGANIZED HEALTH CARE EDUCATION/TRAINING PROGRAM

## 2024-10-31 PROCEDURE — 99999 PR PBB SHADOW E&M-EST. PATIENT-LVL V: CPT | Mod: PBBFAC,,, | Performed by: STUDENT IN AN ORGANIZED HEALTH CARE EDUCATION/TRAINING PROGRAM

## 2024-10-31 RX ORDER — METOPROLOL SUCCINATE 50 MG/1
50 TABLET, EXTENDED RELEASE ORAL DAILY
Qty: 30 TABLET | Refills: 11 | Status: SHIPPED | OUTPATIENT
Start: 2024-10-31 | End: 2025-10-31

## 2024-10-31 NOTE — PROGRESS NOTES
PCP: Coni, Primary Doctor    Referring Provider: Loni Dominguez MD    Subjective:   Ade Grant is a 69 y.o. female with hx of hypertension who presents for a follow-up visit.     7/25/24: CCTA was scheduled but pt unable to go as she notes she is unable to go to [facility]. Endorses intermittent dyspnea on exertion, occasional orthpnea and episodes of chest pain,    4/25/2024: Presents for scheduled follow-up. Endorses central chest heaviness over the last few months. Notes she had 1-2 episodes/week. It happens both with exertion and at rest. Complains of shortness of breath w/ exertion- unchanged from before. No orthopnea, PND, leg swelling, palpitations, lightheadedness or syncope.    09/06/2023:  Presents for follow-up after SUSAN.  Complaints of occasional shortness of breath with exertion.  Otherwise doing well.     07/20/2023: Presents for follow-up. Endorses dyspnea on exertion. ROS positive for orthopnea. No leg swelling.    05/01/2023: Complains of chest pain going on for years. Last saw a cardiologist in [place], [place] about 5-6 years ago who has since retired as well. Complains of central chest pain which sometimes feels like heart burn. Also complains of dyspnea on exertion- ongoing for several years, no echo at least in the last 5 years.  Does not recall having had a stress test or a cardiac catheterization in the past.  She is on lasix 40 mg daily- started recently by Dr. Dominguez.    Fhx: Mother had CABG at around 65 also had PAD, older brother had CABG in his 40s   Shx: Active smoker - 1 PPD for 54 pack years     EKG 4/25/24: NSR, 1st degree AV block, nonspecific IVCD, T wave abnormality consider lateral ischemia 8/23/23:  NSR, 1st degree AV block, LBBB   5/1/23: NSR, 1st degree AV block, LBBB     ECHO     Results for orders placed during the hospital encounter of 02/19/24    Echo    Interpretation Summary    Left Ventricle: The left ventricle is normal in size. Increased wall thickness. There is normal  systolic function. Biplane (2D) method of discs ejection fraction is 60%. There is normal diastolic function.    Right Ventricle: Normal right ventricular cavity size. Systolic function is normal.    Aortic Valve: The aortic valve is a trileaflet valve. Mildly calcified cusps.    Mitral Valve: There is mild bileaflet sclerosis. Mildly thickened leaflets. There is no stenosis. The mean pressure gradient across the mitral valve is 3 mmHg at a heart rate of  bpm. There is moderate to severe regurgitation with an eccentric jet.    Tricuspid Valve: There is mild regurgitation.    Pulmonary Artery: The estimated pulmonary artery systolic pressure is 31 mmHg.    IVC/SVC: Normal venous pressure at 3 mmHg.    Results for orders placed during the hospital encounter of 05/19/23    Echo    Interpretation Summary  · The left ventricle is normal in size with normal systolic function.  · The estimated ejection fraction is 55%.  · Normal left ventricular diastolic function.  · Atrial fibrillation not observed.  · Normal right ventricular size with normal right ventricular systolic function.  · Moderate-to-severe mitral regurgitation.  · Mild aortic regurgitation.  · Normal central venous pressure (3 mmHg).  · The estimated PA systolic pressure is 24 mmHg.       CATH: No results found for this or any previous visit.      NM MPI 5/19/23:  Impression:     1.  Scintigraphically negative for ischemia or infarct.  2. the global left ventricular systolic function is normal with an LV ejection fraction of 90 % and no evidence of LV dilatation. Wall motion is normal.     Lab Results   Component Value Date     07/25/2024    K 3.7 07/25/2024     07/25/2024    CO2 29 07/25/2024    BUN 17 07/25/2024    CREATININE 0.66 07/25/2024    CALCIUM 9.4 07/25/2024    ANIONGAP 12 07/25/2024    EGFRNONAA 74 03/17/2022       Lab Results   Component Value Date    CHOL 84 04/12/2023    CHOL 285 (H) 11/16/2022    CHOL 97 03/17/2022     Lab Results    Component Value Date    HDL 31 (L) 04/12/2023    HDL 31 (L) 11/16/2022    HDL 31 (L) 03/17/2022     Lab Results   Component Value Date    LDLCALC 17 04/12/2023    LDLCALC 196 11/16/2022    LDLCALC 23 03/17/2022     Lab Results   Component Value Date    TRIG 182 (H) 04/12/2023    TRIG 290 (H) 11/16/2022    TRIG 213 (H) 03/17/2022     Lab Results   Component Value Date    CHOLHDL 2.7 04/12/2023    CHOLHDL 9.2 11/16/2022    CHOLHDL 3.1 03/17/2022       Lab Results   Component Value Date    WBC 8.89 03/17/2022    HGB 15.7 03/17/2022    HCT 47.6 (H) 03/17/2022    MCV 90.2 03/17/2022     03/17/2022           Current Outpatient Medications:     blood pressure test kit-large Kit, 1 kit by Misc.(Non-Drug; Combo Route) route once daily., Disp: 1 each, Rfl: 0    carvediloL (COREG) 12.5 MG tablet, Take 2 tablets in the AM and 1 tablet in the PM, Disp: 270 tablet, Rfl: 1    clopidogreL (PLAVIX) 75 mg tablet, Take 1 tablet (75 mg total) by mouth once daily., Disp: 90 tablet, Rfl: 1    cyclobenzaprine (FLEXERIL) 10 MG tablet, Take 1 tablet (10 mg total) by mouth 3 (three) times daily as needed for Muscle spasms., Disp: 270 tablet, Rfl: 0    doxycycline (VIBRA-TABS) 100 MG tablet, Take 1 tablet (100 mg total) by mouth 2 (two) times daily., Disp: 20 tablet, Rfl: 0    furosemide (LASIX) 20 MG tablet, Take 1 tablet (20 mg total) by mouth once daily., Disp: 90 tablet, Rfl: 0    gabapentin (NEURONTIN) 600 MG tablet, Take 1 tablet (600 mg total) by mouth every 8 (eight) hours., Disp: 90 tablet, Rfl: 2    HYDROcodone-acetaminophen (NORCO)  mg per tablet, Take 1 tablet by mouth every 8 (eight) hours as needed for Pain., Disp: 90 tablet, Rfl: 0    [START ON 11/29/2024] HYDROcodone-acetaminophen (NORCO)  mg per tablet, Take 1 tablet by mouth every 8 (eight) hours as needed for Pain., Disp: 90 tablet, Rfl: 0    [START ON 12/29/2024] HYDROcodone-acetaminophen (NORCO)  mg per tablet, Take 1 tablet by mouth every 8  "(eight) hours as needed for Pain., Disp: 90 tablet, Rfl: 0    lisinopriL-hydrochlorothiazide (PRINZIDE,ZESTORETIC) 20-25 mg Tab, Take 1 tablet by mouth once daily., Disp: 90 tablet, Rfl: 1    montelukast (SINGULAIR) 10 mg tablet, Take 1 tablet (10 mg total) by mouth every evening., Disp: 90 tablet, Rfl: 10    potassium chloride SA (K-DUR,KLOR-CON) 20 MEQ tablet, Take 1 tablet (20 mEq total) by mouth once daily., Disp: 90 tablet, Rfl: 10    rosuvastatin (CRESTOR) 40 MG Tab, Take 1 tablet (40 mg total) by mouth nightly., Disp: 90 tablet, Rfl: 1    sertraline (ZOLOFT) 50 MG tablet, Take 1 tablet (50 mg total) by mouth once daily., Disp: 90 tablet, Rfl: 1    Review of Systems   Constitutional:  Negative for chills, diaphoresis, fever and malaise/fatigue.   Respiratory:  Positive for shortness of breath. Negative for cough.    Cardiovascular:  Positive for chest pain. Negative for palpitations, orthopnea, claudication, leg swelling and PND.   Gastrointestinal:  Negative for abdominal pain, heartburn, nausea and vomiting.   Neurological:  Negative for dizziness.         Objective:   BP (!) 142/102 (BP Location: Left arm, Patient Position: Sitting)   Pulse 80   Ht 5' 3" (1.6 m)   Wt 71.4 kg (157 lb 6.4 oz)   SpO2 97%   BMI 27.88 kg/m²     Physical Exam  Constitutional:       General: She is not in acute distress.     Appearance: Normal appearance.   Cardiovascular:      Rate and Rhythm: Normal rate and regular rhythm.      Pulses: Normal pulses.      Heart sounds: Normal heart sounds. No murmur heard.     No friction rub. No gallop.   Pulmonary:      Effort: Pulmonary effort is normal.      Breath sounds: Normal breath sounds. No wheezing or rales.   Musculoskeletal:      Right lower leg: No edema.      Left lower leg: No edema.   Skin:     General: Skin is warm and dry.   Neurological:      Mental Status: She is alert.           Assessment:     1. Uncontrolled hypertension  EKG 12-lead                  Plan:   No " problem-specific Assessment & Plan notes found for this encounter.    Moderate-to-severe MR   Normal LVEF and normal LV size.  SUSAN moderate MR   Last echo 2/19/24 (unchanged from prior).   Schedule cardiac MRI for further evaluation of mitral regurgiation  Check BMP, proBNP today    Chest pain   Risk factors: DM, HTN, current heavy smoking, FH  NM MPI negative for ischemia or infarct   Pt canceled CCTA due to transport issues. Hold off for now.     Follow-up in 3 months     .           Stop lasix   - Referral placed to Dr. Adame for HCM    Chest pain ( improved)  - NM MPI negative for ischemia    Bilateral lower extremity claudication  - Schedule ABIs with ultrasound    Tobacco dependence  - Patient is in pre contemplative stage. Smoking cessation has been addressed at previous visits. She does not want to further address it at this time.    Syncope  Near syncope  Palpitations  - 14 day ambulatory cardiac monitor placed today    Follow-up in  1 month    .

## 2024-10-31 NOTE — PATIENT INSTRUCTIONS
Start metoprolol 50 mg daily   Decrease KCL to 10 mEQ daily   Stop lisinopril-HCTZ   Stop coreg   Stop lasix   ABIs with ultrasound on ->November 13, 2024 at 9:00 am  Heart monitor today   Referral to Dr. Adame for HCM  Follow-up in 1 month

## 2024-11-01 ENCOUNTER — HOSPITAL ENCOUNTER (OUTPATIENT)
Dept: CARDIOLOGY | Facility: HOSPITAL | Age: 69
Discharge: HOME | End: 2024-11-01
Attending: STUDENT IN AN ORGANIZED HEALTH CARE EDUCATION/TRAINING PROGRAM
Payer: MEDICARE

## 2024-11-01 DIAGNOSIS — R55 SYNCOPE AND COLLAPSE: ICD-10-CM

## 2024-11-01 PROCEDURE — 93246 EXT ECG>7D<15D RECORDING: CPT

## 2024-11-04 LAB
OHS QRS DURATION: 130 MS
OHS QTC CALCULATION: 543 MS

## 2024-11-13 ENCOUNTER — HOSPITAL ENCOUNTER (OUTPATIENT)
Dept: RADIOLOGY | Facility: HOSPITAL | Age: 69
Discharge: HOME | End: 2024-11-13
Attending: STUDENT IN AN ORGANIZED HEALTH CARE EDUCATION/TRAINING PROGRAM
Payer: MEDICARE

## 2024-11-13 DIAGNOSIS — I73.9 CLAUDICATION: ICD-10-CM

## 2024-11-13 PROCEDURE — 93922 UPR/L XTREMITY ART 2 LEVELS: CPT | Mod: 26,,, | Performed by: RADIOLOGY

## 2024-11-13 PROCEDURE — 93925 LOWER EXTREMITY STUDY: CPT | Mod: TC

## 2024-11-13 PROCEDURE — 93925 LOWER EXTREMITY STUDY: CPT | Mod: 26,,, | Performed by: RADIOLOGY

## 2024-12-03 ENCOUNTER — OFFICE VISIT (OUTPATIENT)
Dept: CARDIOLOGY | Facility: CLINIC | Age: 69
End: 2024-12-03
Payer: MEDICARE

## 2024-12-03 VITALS
HEIGHT: 63 IN | HEART RATE: 90 BPM | BODY MASS INDEX: 27.75 KG/M2 | DIASTOLIC BLOOD PRESSURE: 100 MMHG | WEIGHT: 156.63 LBS | OXYGEN SATURATION: 96 % | SYSTOLIC BLOOD PRESSURE: 160 MMHG

## 2024-12-03 DIAGNOSIS — I42.1 HYPERTROPHIC OBSTRUCTIVE CARDIOMYOPATHY: Primary | ICD-10-CM

## 2024-12-03 DIAGNOSIS — I34.0 MITRAL VALVE INSUFFICIENCY, UNSPECIFIED ETIOLOGY: ICD-10-CM

## 2024-12-03 PROCEDURE — 99214 OFFICE O/P EST MOD 30 MIN: CPT | Mod: PBBFAC | Performed by: STUDENT IN AN ORGANIZED HEALTH CARE EDUCATION/TRAINING PROGRAM

## 2024-12-03 RX ORDER — METOPROLOL SUCCINATE 100 MG/1
100 TABLET, EXTENDED RELEASE ORAL DAILY
Qty: 90 TABLET | Refills: 3 | Status: SHIPPED | OUTPATIENT
Start: 2024-12-03 | End: 2025-01-27 | Stop reason: SDUPTHER

## 2024-12-03 NOTE — PATIENT INSTRUCTIONS
Increase metoprolol to 100 mg daily   Patient to call office in 1 month re: BP and HR   Follow-up in 3 months

## 2024-12-03 NOTE — PROGRESS NOTES
PCP: Valery Reynoso FNP    Referring Provider:     Subjective:   Ade Grant is a 70 y.o. female with hx of hypertension who presents for a follow-up visit.     12/3/24:  Feels significantly better since medication changes done at last visit.  Dyspnea on exertion and palpitations has improved.  No recurrence of chest pain.  Though with a recurrence of syncope    10/31/24:   Follow up scheduled to discuss recent cardiac MRI results.  Cardiac MRI showed discrete asymmetric septal hypertrophy causing obstructive hypertrophic cardiomyopathy involving basal anterior and anteroseptal left ventricular wall with associated systolic anterior motion of the anterior mitral valve leaflet   With associated severe MR.  No delayed LGE noted.  LVEF normal at 66%.  Complains of stable NYHA class 2 dyspnea on exertion.  She had an episode of chest pain a few weeks ago-  described as a dull pain when she was lying down.  She endorses daily palpitations and a syncopal episode about a month ago. Started metoprolol 50 mg daily, discontinued lisinopril-HCTZ, coreg and lasix. Kcl decreased t 10 MEQ daily    7/25/24: CCTA was scheduled but pt unable to go as she notes she is unable to go to [facility]. Endorses intermittent dyspnea on exertion, occasional orthpnea and episodes of chest pain,    4/25/2024: Presents for scheduled follow-up. Endorses central chest heaviness over the last few months. Notes she had 1-2 episodes/week. It happens both with exertion and at rest. Complains of shortness of breath w/ exertion- unchanged from before. No orthopnea, PND, leg swelling, palpitations, lightheadedness or syncope.    09/06/2023:  Presents for follow-up after SUSAN.  Complaints of occasional shortness of breath with exertion.  Otherwise doing well.     07/20/2023: Presents for follow-up. Endorses dyspnea on exertion. ROS positive for orthopnea. No leg swelling.    05/01/2023: Complains of chest pain going on for years. Last saw a  cardiologist in [place], [place] about 5-6 years ago who has since retired as well. Complains of central chest pain which sometimes feels like heart burn. Also complains of dyspnea on exertion- ongoing for several years, no echo at least in the last 5 years.  Does not recall having had a stress test or a cardiac catheterization in the past.  She is on lasix 40 mg daily- started recently by Dr. Dominguez.    Fhx: Mother had CABG at around 65 also had PAD, older brother had CABG in his 40s   Shx: Active smoker - 1 PPD for 54 pack years     EKG 10/31/2024: Sinus rhythm with first-degree AV block, left bundle-branch block  4/25/24: NSR, 1st degree AV block, nonspecific IVCD, T wave abnormality consider lateral ischemia   8/23/23:  NSR, 1st degree AV block, LBBB   5/1/23: NSR, 1st degree AV block, LBBB     ECHO     Results for orders placed during the hospital encounter of 02/19/24    Echo    Interpretation Summary    Left Ventricle: The left ventricle is normal in size. Increased wall thickness. There is normal systolic function. Biplane (2D) method of discs ejection fraction is 60%. There is normal diastolic function.    Right Ventricle: Normal right ventricular cavity size. Systolic function is normal.    Aortic Valve: The aortic valve is a trileaflet valve. Mildly calcified cusps.    Mitral Valve: There is mild bileaflet sclerosis. Mildly thickened leaflets. There is no stenosis. The mean pressure gradient across the mitral valve is 3 mmHg at a heart rate of  bpm. There is moderate to severe regurgitation with an eccentric jet.    Tricuspid Valve: There is mild regurgitation.    Pulmonary Artery: The estimated pulmonary artery systolic pressure is 31 mmHg.    IVC/SVC: Normal venous pressure at 3 mmHg.    Results for orders placed during the hospital encounter of 05/19/23    Echo    Interpretation Summary  · The left ventricle is normal in size with normal systolic function.  · The estimated ejection fraction is 55%.  · Normal  left ventricular diastolic function.  · Atrial fibrillation not observed.  · Normal right ventricular size with normal right ventricular systolic function.  · Moderate-to-severe mitral regurgitation.  · Mild aortic regurgitation.  · Normal central venous pressure (3 mmHg).  · The estimated PA systolic pressure is 24 mmHg.       CATH: No results found for this or any previous visit.      NM MPI 5/19/23:  Impression:     1.  Scintigraphically negative for ischemia or infarct.  2. the global left ventricular systolic function is normal with an LV ejection fraction of 90 % and no evidence of LV dilatation. Wall motion is normal.    Cardiac MRI 10/14/24:    IMPRESSION:     1.  Discrete asymmetric septal hypertrophy causing obstructive hypertrophic cardiomyopathy involving the basal anterior and anteroseptal left ventricular wall with associated systolic anterior motion of the anterior mitral valve leaflet. Myocardial wall thickness measures up to 17 mm.   2.  Normal left ventricular size and systolic function with calculated ejection fraction of 66%.   3.  No delayed gadolinium enhancement to suggest previous myocardial infarction, active inflammation or infiltrative myocardial disease.   4.  Mild heterogeneous increased precontrast T1 signal in the basal septum which could reflect early fibrotic changes.   5.  Severe mitral valve regurgitation.         Fourteen day ambulatory cardiac monitor 11/2024     Predominant normal sinus rhythm. No significant cardiac arrythmias to explain syncope.     Patient had a min HR of 61 bpm, max HR of 162 bpm, and avg HR of 78 bpm. Predominant underlying rhythm was Sinus Rhythm. First Degree AV Block was present. Bundle Branch Block/IVCD was present. 3 Supraventricular Tachycardia runs occurred, the run with the fastest interval lasting 6 beats with a max rate of 162 bpm, the longest lasting 17 beats with an avg rate of 134 bpm. Isolated SVEs were rare (<1.0%, 1093), and no SVE Couplets  or SVE Triplets were present. Isolated VEs were rare (<1.0%, 250), VE Couplets were rare (<1.0%, 44), and no VE Triplets were present.     Lab Results   Component Value Date     12/18/2024    K 3.8 12/18/2024     12/18/2024    CO2 27 12/18/2024    BUN 8 (L) 12/18/2024    CREATININE 0.64 12/18/2024    CALCIUM 9.5 12/18/2024    ANIONGAP 14 12/18/2024    EGFRNONAA 74 03/17/2022       Lab Results   Component Value Date    CHOL 123 12/18/2024    CHOL 84 04/12/2023    CHOL 285 (H) 11/16/2022     Lab Results   Component Value Date    HDL 44 12/18/2024    HDL 31 (L) 04/12/2023    HDL 31 (L) 11/16/2022     Lab Results   Component Value Date    LDLCALC 49 12/18/2024    LDLCALC 17 04/12/2023    LDLCALC 196 11/16/2022     Lab Results   Component Value Date    TRIG 152 (H) 12/18/2024    TRIG 182 (H) 04/12/2023    TRIG 290 (H) 11/16/2022     Lab Results   Component Value Date    CHOLHDL 2.8 12/18/2024    CHOLHDL 2.7 04/12/2023    CHOLHDL 9.2 11/16/2022       Lab Results   Component Value Date    WBC 10.62 12/18/2024    HGB 16.7 (H) 12/18/2024    HCT 52.3 (H) 12/18/2024    MCV 93.7 12/18/2024     12/18/2024           Current Outpatient Medications:     blood pressure test kit-large Kit, 1 kit by Misc.(Non-Drug; Combo Route) route once daily., Disp: 1 each, Rfl: 0    CAPVAXIVE 0.5 mL Syrg, Inject into the muscle., Disp: , Rfl:     clopidogreL (PLAVIX) 75 mg tablet, Take 1 tablet (75 mg total) by mouth once daily., Disp: 90 tablet, Rfl: 1    cyclobenzaprine (FLEXERIL) 10 MG tablet, Take 1 tablet (10 mg total) by mouth every 8 (eight) hours., Disp: 270 tablet, Rfl: 0    gabapentin (NEURONTIN) 600 MG tablet, Take 1 tablet (600 mg total) by mouth every 8 (eight) hours., Disp: 90 tablet, Rfl: 2    hydroCHLOROthiazide 12.5 MG Tab, Take 1 tablet (12.5 mg total) by mouth once daily., Disp: 90 tablet, Rfl: 1    HYDROcodone-acetaminophen (NORCO)  mg per tablet, Take 1 tablet by mouth every 8 (eight) hours as needed  "for Pain., Disp: 90 tablet, Rfl: 0    lisinopriL (PRINIVIL,ZESTRIL) 20 MG tablet, Take 1 tablet (20 mg total) by mouth once daily., Disp: 90 tablet, Rfl: 1    lisinopriL-hydrochlorothiazide (PRINZIDE,ZESTORETIC) 20-25 mg Tab, Take 1 tablet by mouth once daily., Disp: , Rfl:     metoprolol succinate (TOPROL-XL) 100 MG 24 hr tablet, Take 2 tablets (200 mg total) by mouth once daily., Disp: 180 tablet, Rfl: 1    potassium chloride SA (K-DUR,KLOR-CON) 20 MEQ tablet, Take 1 tablet (20 mEq total) by mouth once daily., Disp: 90 tablet, Rfl: 3    rosuvastatin (CRESTOR) 40 MG Tab, Take 1 tablet (40 mg total) by mouth once daily., Disp: 90 tablet, Rfl: 1    sertraline (ZOLOFT) 50 MG tablet, Take 1 tablet (50 mg total) by mouth once daily., Disp: 90 tablet, Rfl: 1    Review of Systems   Constitutional:  Negative for chills, diaphoresis, fever and malaise/fatigue.   Respiratory:  Positive for shortness of breath. Negative for cough.    Cardiovascular:  Positive for chest pain. Negative for palpitations, orthopnea, claudication, leg swelling and PND.   Gastrointestinal:  Negative for abdominal pain, heartburn, nausea and vomiting.   Neurological:  Negative for dizziness.         Objective:   BP (!) 160/100 (BP Location: Right arm, Patient Position: Sitting)   Pulse 90   Ht 5' 3" (1.6 m)   Wt 71 kg (156 lb 9.6 oz)   SpO2 96%   BMI 27.74 kg/m²     Physical Exam  Constitutional:       General: She is not in acute distress.     Appearance: Normal appearance.   Cardiovascular:      Rate and Rhythm: Normal rate and regular rhythm.      Pulses: Normal pulses.      Heart sounds: Normal heart sounds. No murmur heard.     No friction rub. No gallop.   Pulmonary:      Effort: Pulmonary effort is normal.      Breath sounds: Normal breath sounds. No wheezing or rales.   Musculoskeletal:      Right lower leg: No edema.      Left lower leg: No edema.   Skin:     General: Skin is warm and dry.   Neurological:      Mental Status: She is " alert.           Assessment:     1. Hypertrophic obstructive cardiomyopathy        2. Mitral valve insufficiency, unspecified etiology            Plan:   No problem-specific Assessment & Plan notes found for this encounter.     Hypertrophic obstructive cardiomyopathy  - Cardiac MRI showed discrete asymmetric septal hypertrophy causing obstructive hypertrophic cardiomyopathy involving basal anterior and anteroseptal left ventricular wall with associated systolic anterior motion of the anterior mitral valve leaflet   With associated severe MR  - Increase metoprolol to 100 mg daily  - Unable to come in for vitals check as she lives far. Instructed to call office for BP and HR trends at home.   - Referral placed to Dr. Adame for HCM after last office visit. Pending visit     Chest pain ( improved)  - NM MPI negative for ischemia    Bilateral lower extremity claudication  - ABIs with ultrasound normal. No evidence for hemodynamically significant stenosis.  Normal SIRIA.    Tobacco dependence  - Patient is in pre contemplative stage. Smoking cessation has been addressed at previous visits. She does not want to further address it at this time.    Syncope (resolved)  Near syncope (resolved)  Palpitations (improved)  - Predominant normal sinus rhythm. No significant cardiac arrythmias to explain syncope.       Follow-up in 3 months     .

## 2024-12-18 ENCOUNTER — OFFICE VISIT (OUTPATIENT)
Dept: FAMILY MEDICINE | Facility: CLINIC | Age: 69
End: 2024-12-18
Payer: MEDICARE

## 2024-12-18 ENCOUNTER — TELEPHONE (OUTPATIENT)
Dept: CARDIOLOGY | Facility: CLINIC | Age: 69
End: 2024-12-18
Payer: MEDICARE

## 2024-12-18 VITALS
OXYGEN SATURATION: 95 % | HEART RATE: 80 BPM | DIASTOLIC BLOOD PRESSURE: 104 MMHG | SYSTOLIC BLOOD PRESSURE: 187 MMHG | HEIGHT: 63 IN | RESPIRATION RATE: 18 BRPM | BODY MASS INDEX: 27.64 KG/M2 | TEMPERATURE: 98 F | WEIGHT: 156 LBS

## 2024-12-18 DIAGNOSIS — M54.12 CERVICAL RADICULOPATHY: Chronic | ICD-10-CM

## 2024-12-18 DIAGNOSIS — I50.9 HEART FAILURE, UNSPECIFIED HF CHRONICITY, UNSPECIFIED HEART FAILURE TYPE: Chronic | ICD-10-CM

## 2024-12-18 DIAGNOSIS — F32.A ANXIETY AND DEPRESSION: Chronic | ICD-10-CM

## 2024-12-18 DIAGNOSIS — M54.17 LUMBOSACRAL RADICULOPATHY: Chronic | ICD-10-CM

## 2024-12-18 DIAGNOSIS — Z87.891 HISTORY OF TOBACCO USE: Chronic | ICD-10-CM

## 2024-12-18 DIAGNOSIS — Z12.39 ENCOUNTER FOR OTHER SCREENING FOR MALIGNANT NEOPLASM OF BREAST: ICD-10-CM

## 2024-12-18 DIAGNOSIS — E78.5 DYSLIPIDEMIA: Chronic | ICD-10-CM

## 2024-12-18 DIAGNOSIS — Z12.31 SCREENING MAMMOGRAM FOR BREAST CANCER: ICD-10-CM

## 2024-12-18 DIAGNOSIS — J44.9 CHRONIC OBSTRUCTIVE PULMONARY DISEASE, UNSPECIFIED COPD TYPE: Chronic | ICD-10-CM

## 2024-12-18 DIAGNOSIS — Z13.820 ENCOUNTER FOR OSTEOPOROSIS SCREENING IN ASYMPTOMATIC POSTMENOPAUSAL PATIENT: ICD-10-CM

## 2024-12-18 DIAGNOSIS — Z00.00 ENCOUNTER FOR INITIAL ANNUAL WELLNESS VISIT (AWV) IN MEDICARE PATIENT: Primary | ICD-10-CM

## 2024-12-18 DIAGNOSIS — F17.200 SMOKER: Chronic | ICD-10-CM

## 2024-12-18 DIAGNOSIS — I10 PRIMARY HYPERTENSION: Chronic | ICD-10-CM

## 2024-12-18 DIAGNOSIS — R73.9 ELEVATED BLOOD SUGAR: ICD-10-CM

## 2024-12-18 DIAGNOSIS — Z12.2 ENCOUNTER FOR SCREENING FOR LUNG CANCER: ICD-10-CM

## 2024-12-18 DIAGNOSIS — Z78.0 ENCOUNTER FOR OSTEOPOROSIS SCREENING IN ASYMPTOMATIC POSTMENOPAUSAL PATIENT: ICD-10-CM

## 2024-12-18 DIAGNOSIS — F41.9 ANXIETY AND DEPRESSION: Chronic | ICD-10-CM

## 2024-12-18 PROBLEM — I42.1 HYPERTROPHIC OBSTRUCTIVE CARDIOMYOPATHY: Status: ACTIVE | Noted: 2024-12-18

## 2024-12-18 PROBLEM — H34.231 BRANCH RETINAL ARTERY OCCLUSION OF RIGHT EYE: Status: ACTIVE | Noted: 2019-03-07

## 2024-12-18 PROBLEM — H25.13 CATARACT, NUCLEAR SCLEROTIC SENILE, BILATERAL: Status: RESOLVED | Noted: 2019-07-18 | Resolved: 2024-12-18

## 2024-12-18 LAB
ALBUMIN SERPL BCP-MCNC: 4.3 G/DL (ref 3.4–4.8)
ALBUMIN/GLOB SERPL: 1.1 {RATIO}
ALP SERPL-CCNC: 82 U/L (ref 40–150)
ALT SERPL W P-5'-P-CCNC: 16 U/L
ANION GAP SERPL CALCULATED.3IONS-SCNC: 14 MMOL/L (ref 7–16)
AST SERPL W P-5'-P-CCNC: 52 U/L (ref 5–34)
BASOPHILS # BLD AUTO: 0.14 K/UL (ref 0–0.2)
BASOPHILS NFR BLD AUTO: 1.3 % (ref 0–1)
BILIRUB SERPL-MCNC: 0.4 MG/DL
BUN SERPL-MCNC: 8 MG/DL (ref 10–20)
BUN/CREAT SERPL: 13 (ref 6–20)
CALCIUM SERPL-MCNC: 9.5 MG/DL (ref 8.4–10.2)
CHLORIDE SERPL-SCNC: 106 MMOL/L (ref 98–107)
CHOLEST SERPL-MCNC: 123 MG/DL
CHOLEST/HDLC SERPL: 2.8 {RATIO}
CO2 SERPL-SCNC: 27 MMOL/L (ref 23–31)
CREAT SERPL-MCNC: 0.64 MG/DL (ref 0.55–1.02)
CREAT UR-MCNC: 17 MG/DL (ref 15–325)
DIFFERENTIAL METHOD BLD: ABNORMAL
EGFR (NO RACE VARIABLE) (RUSH/TITUS): 97 ML/MIN/1.73M2
EOSINOPHIL # BLD AUTO: 0.03 K/UL (ref 0–0.5)
EOSINOPHIL NFR BLD AUTO: 0.3 % (ref 1–4)
ERYTHROCYTE [DISTWIDTH] IN BLOOD BY AUTOMATED COUNT: 14.6 % (ref 11.5–14.5)
EST. AVERAGE GLUCOSE BLD GHB EST-MCNC: 111 MG/DL
GLOBULIN SER-MCNC: 3.9 G/DL (ref 2–4)
GLUCOSE SERPL-MCNC: 98 MG/DL (ref 82–115)
HBA1C MFR BLD HPLC: 5.5 %
HCT VFR BLD AUTO: 52.3 % (ref 38–47)
HDLC SERPL-MCNC: 44 MG/DL (ref 35–60)
HGB BLD-MCNC: 16.7 G/DL (ref 12–16)
IMM GRANULOCYTES # BLD AUTO: 0.03 K/UL (ref 0–0.04)
IMM GRANULOCYTES NFR BLD: 0.3 % (ref 0–0.4)
LDLC SERPL CALC-MCNC: 49 MG/DL
LDLC/HDLC SERPL: 1.1 {RATIO}
LYMPHOCYTES # BLD AUTO: 4.28 K/UL (ref 1–4.8)
LYMPHOCYTES NFR BLD AUTO: 40.3 % (ref 27–41)
MCH RBC QN AUTO: 29.9 PG (ref 27–31)
MCHC RBC AUTO-ENTMCNC: 31.9 G/DL (ref 32–36)
MCV RBC AUTO: 93.7 FL (ref 80–96)
MICROALBUMIN UR-MCNC: 1.3 MG/DL
MICROALBUMIN/CREAT RATIO PNL UR: 76.5 MG/G (ref 0–30)
MONOCYTES # BLD AUTO: 0.61 K/UL (ref 0–0.8)
MONOCYTES NFR BLD AUTO: 5.7 % (ref 2–6)
MPC BLD CALC-MCNC: 12.6 FL (ref 9.4–12.4)
NEUTROPHILS # BLD AUTO: 5.53 K/UL (ref 1.8–7.7)
NEUTROPHILS NFR BLD AUTO: 52.1 % (ref 53–65)
NONHDLC SERPL-MCNC: 79 MG/DL
NRBC # BLD AUTO: 0 X10E3/UL
NRBC, AUTO (.00): 0 %
PLATELET # BLD AUTO: 208 K/UL (ref 150–400)
POTASSIUM SERPL-SCNC: 3.8 MMOL/L (ref 3.5–5.1)
PROT SERPL-MCNC: 8.2 G/DL (ref 5.8–7.6)
RBC # BLD AUTO: 5.58 M/UL (ref 4.2–5.4)
SODIUM SERPL-SCNC: 143 MMOL/L (ref 136–145)
TRIGL SERPL-MCNC: 152 MG/DL (ref 37–140)
VLDLC SERPL-MCNC: 30 MG/DL
WBC # BLD AUTO: 10.62 K/UL (ref 4.5–11)

## 2024-12-18 PROCEDURE — 1100F PTFALLS ASSESS-DOCD GE2>/YR: CPT | Mod: ,,, | Performed by: NURSE PRACTITIONER

## 2024-12-18 PROCEDURE — 82570 ASSAY OF URINE CREATININE: CPT | Mod: ,,, | Performed by: CLINICAL MEDICAL LABORATORY

## 2024-12-18 PROCEDURE — 1125F AMNT PAIN NOTED PAIN PRSNT: CPT | Mod: ,,, | Performed by: NURSE PRACTITIONER

## 2024-12-18 PROCEDURE — 3288F FALL RISK ASSESSMENT DOCD: CPT | Mod: ,,, | Performed by: NURSE PRACTITIONER

## 2024-12-18 PROCEDURE — 1170F FXNL STATUS ASSESSED: CPT | Mod: ,,, | Performed by: NURSE PRACTITIONER

## 2024-12-18 PROCEDURE — 80061 LIPID PANEL: CPT | Mod: ,,, | Performed by: CLINICAL MEDICAL LABORATORY

## 2024-12-18 PROCEDURE — 3080F DIAST BP >= 90 MM HG: CPT | Mod: ,,, | Performed by: NURSE PRACTITIONER

## 2024-12-18 PROCEDURE — 83036 HEMOGLOBIN GLYCOSYLATED A1C: CPT | Mod: ,,, | Performed by: CLINICAL MEDICAL LABORATORY

## 2024-12-18 PROCEDURE — 80053 COMPREHEN METABOLIC PANEL: CPT | Mod: ,,, | Performed by: CLINICAL MEDICAL LABORATORY

## 2024-12-18 PROCEDURE — 3077F SYST BP >= 140 MM HG: CPT | Mod: ,,, | Performed by: NURSE PRACTITIONER

## 2024-12-18 PROCEDURE — 1158F ADVNC CARE PLAN TLK DOCD: CPT | Mod: ,,, | Performed by: NURSE PRACTITIONER

## 2024-12-18 PROCEDURE — 4010F ACE/ARB THERAPY RXD/TAKEN: CPT | Mod: ,,, | Performed by: NURSE PRACTITIONER

## 2024-12-18 PROCEDURE — 82043 UR ALBUMIN QUANTITATIVE: CPT | Mod: ,,, | Performed by: CLINICAL MEDICAL LABORATORY

## 2024-12-18 PROCEDURE — 1159F MED LIST DOCD IN RCRD: CPT | Mod: ,,, | Performed by: NURSE PRACTITIONER

## 2024-12-18 PROCEDURE — G0438 PPPS, INITIAL VISIT: HCPCS | Mod: ,,, | Performed by: NURSE PRACTITIONER

## 2024-12-18 PROCEDURE — 85025 COMPLETE CBC W/AUTO DIFF WBC: CPT | Mod: ,,, | Performed by: CLINICAL MEDICAL LABORATORY

## 2024-12-18 RX ORDER — LISINOPRIL AND HYDROCHLOROTHIAZIDE 20; 25 MG/1; MG/1
1 TABLET ORAL DAILY
COMMUNITY
Start: 2024-11-15

## 2024-12-18 RX ORDER — CARVEDILOL 12.5 MG/1
12.5 TABLET ORAL
COMMUNITY
Start: 2024-11-15 | End: 2024-12-18

## 2024-12-18 RX ORDER — LISINOPRIL 10 MG/1
10 TABLET ORAL DAILY
Qty: 30 TABLET | Refills: 1 | Status: SHIPPED | OUTPATIENT
Start: 2024-12-18 | End: 2025-12-18

## 2024-12-18 NOTE — TELEPHONE ENCOUNTER
Call placed to Dr. Adame's office in regards to an appointment.   stated that their office had tried contacting the patient 3 times.  She stated that they were unable to reach the patient.  The  stated that they just needed the patient to call to be able to schedule the appointment.  Call placed to the patient and was notified of the above.  Number given to the patient to call.

## 2024-12-18 NOTE — PATIENT INSTRUCTIONS
Counseling and Referral of Other Preventative  (Italic type indicates deductible and co-insurance are waived)    Patient Name: Ade Grant  Today's Date: 12/18/2024    Health Maintenance       Date Due Completion Date    TETANUS VACCINE Never done ---    Naloxone Prescription Never done ---    Hemoglobin A1c (Diabetic Prevention Screening) Never done ---    Colorectal Cancer Screening Never done ---    LDCT Lung Screen Never done ---    Shingles Vaccine (1 of 2) Never done ---    RSV Vaccine (Age 60+ and Pregnant patients) (1 - Risk 60-74 years 1-dose series) Never done ---    Pneumococcal Vaccines (Age 65+) (2 of 2 - PCV) 03/17/2023 3/17/2022    DEXA Scan 08/28/2023 8/28/2020    Mammogram 04/18/2024 4/18/2023    Influenza Vaccine (1) 09/01/2024 11/16/2022    COVID-19 Vaccine (1 - 2024-25 season) Never done ---    Urine Drug Screen 02/01/2025 8/1/2024    Lipid Panel 04/12/2028 4/12/2023        No orders of the defined types were placed in this encounter.    The following information is provided to all patients.  This information is to help you find resources for any of the problems found today that may be affecting your health:                  Living healthy guide: [place].gov    Understanding Diabetes: www.diabetes.org      Eating healthy: www.cdc.gov/healthyweight      CDC home safety checklist: www.cdc.gov/steadi/patient.html      Agency on Aging: [place].gov    Alcoholics anonymous (AA): www.aa.org      Physical Activity: www.cyndee.nih.gov/yw9kyka      Tobacco use: [place].gov

## 2024-12-18 NOTE — PROGRESS NOTES
Ade Grant presented for a  Medicare AWV and comprehensive Health Risk Assessment today. The following components were reviewed and updated:    Medical history  Family History  Social history  Allergies and Current Medications  Health Risk Assessment  Health Maintenance  Care Team         ** See Completed Assessments for Annual Wellness Visit within the encounter summary.**     Ms Grant is a fairly new patient whom I have only seen once.  She has history of HTN becoming hypotensive on her previous medication  which was recently changed by  pulmonology, Dr GONZALO Ramirez.  Her blood pressure has been running high since her medication change according to patient and has taken her  metoplrolol 100mg already today.  She is no longer on carvedilol or her Lisinopril/HCT.   Blood pressure has been checked multiple times during visit and has come down somewhat--advised to restart lisinopril 10mg once daily along with her metoprolol tartrate 100mg daily and to check bp 3 times daily and follow up in clinic in one week for recheck, keep log. Advised to go to ER if any symptoms of stroke.  She is  on plavix daily and is on rosuvastatin.   She has COPD and is not motivated to quit smoking.    She is followed by pain treatment for chronic  back pain and has had injections in the past.      The following assessments were completed:  Living Situation  CAGE  Depression Screening  Timed Get Up and Go  Whisper Test  Cognitive Function Screening  Nutrition Screening  ADL Screening  PAQ Screening        Opioid Documentation    does have a current opioid prescription.      Patient accepted further discussion regarding opioid medication use.      Patient is currently taking hydrocodone narcotic for generalized pain.        Pain level today is 4/10.       In addition to narcotic pain medications, patient is also using (no other oral, topical, or alternative treatments) for pain control.       Patient is followed by a specialist  currently for their pain and will not be referred today.       Patient's opioid risk potential based on ORT-OUD tool:       Mak each box that applies   No   Yes     Family history of substance abuse   Alcohol [x] []   Illegal drugs [x] []   Rx drugs [x] []     Personal history of substance abuse   Alcohol [x] []   Illegal drugs [x] []   Rx drugs [x] []     Age between 16-45 years   [x]   []     Patient with ADD, OCD, Bipolar disorder, schizoprenia   [x]   []     Patient with depression   []   [x]                         Scoring total                                                                 Non-opioid treatment options have been discussed today and added to the patient's after visit summary.       There were no vitals filed for this visit.  There is no height or weight on file to calculate BMI.  Physical Exam  Constitutional:       Appearance: Normal appearance.   Cardiovascular:      Rate and Rhythm: Normal rate and regular rhythm.      Heart sounds:      No friction rub. No gallop.   Pulmonary:      Effort: Pulmonary effort is normal.      Breath sounds: Normal breath sounds.      Comments: Diminished breath sounds  Musculoskeletal:      Right lower leg: No edema.      Left lower leg: No edema.   Neurological:      Mental Status: She is alert and oriented to person, place, and time.   Psychiatric:         Mood and Affect: Mood normal.         Behavior: Behavior normal.           Diagnoses and health risks identified today and associated recommendations/orders:    Problem List Items Addressed This Visit       Cervical radiculopathy (Chronic)    Lumbosacral radiculopathy (Chronic)    Chronic obstructive pulmonary disease, unspecified    Heart failure, unspecified     Other Visit Diagnoses       Encounter for initial annual wellness visit (AWV) in Medicare patient    -  Primary    Dyslipidemia        Anxiety and depression        Primary hypertension                Provided Ade with a 5-10 year written  screening schedule and personal prevention plan. Recommendations were developed using the USPSTF age appropriate recommendations. Education, counseling, and referrals were provided as needed. After Visit Summary printed and given to patient which includes a list of additional screenings\tests needed.    Patient declines vaccines today , will follow up with pharmacy at later date if desired.     Lipid , A1c ordered today     Bone density /Dexa , mammogram , and ldct / Low dose lung scan ordered to be done at [facility]     Declined Colonoscopy , patient understands importance of early intervention and screening. Agrees to notify clinic when she is able to have colonoscopy done, declines at this time,        BETH PIZARRO RN      I offered to discuss advanced care planning, including how to pick a person who would make decisions for you if you were unable to make them for yourself, called a health care power of , and what kind of decisions you might make such as use of life sustaining treatments such as ventilators and tube feeding when faced with a life limiting illness recorded on a living will that they will need to know. (How you want to be cared for as you near the end of your natural life)     X Patient is interested in learning more about how to make advanced directives.  I provided them paperwork and offered to discuss this with them.

## 2024-12-19 ENCOUNTER — TELEPHONE (OUTPATIENT)
Dept: FAMILY MEDICINE | Facility: CLINIC | Age: 69
End: 2024-12-19
Payer: MEDICARE

## 2024-12-19 NOTE — PROGRESS NOTES
Notify Ms Grant that her cholesterol is good and continue with meds along with heart healthy diet.  Electrolytes and kidney, liver function tests all normal.  Urine test shows an increased loss of protein and  keeping blood pressure well controlled will limit further damage to her kidneys. Blood count is slightly elevated and needs to increase her fluids and stop smoking to help with this.  A1c is 5.5 and good.  She needs to follow up for bp check as recommended yesterday in approx 2 weeks with bp log. /tm

## 2024-12-24 ENCOUNTER — TELEPHONE (OUTPATIENT)
Dept: CARDIOLOGY | Facility: CLINIC | Age: 69
End: 2024-12-24
Payer: MEDICARE

## 2024-12-27 ENCOUNTER — PATIENT OUTREACH (OUTPATIENT)
Dept: FAMILY MEDICINE | Facility: CLINIC | Age: 69
End: 2024-12-27
Payer: MEDICARE

## 2024-12-29 DIAGNOSIS — M54.17 LUMBOSACRAL RADICULOPATHY: Chronic | ICD-10-CM

## 2024-12-29 DIAGNOSIS — M62.830 BACK MUSCLE SPASM: ICD-10-CM

## 2025-01-03 RX ORDER — CYCLOBENZAPRINE HCL 10 MG
10 TABLET ORAL EVERY 8 HOURS
Qty: 270 TABLET | Refills: 3 | Status: SHIPPED | OUTPATIENT
Start: 2025-01-03

## 2025-01-07 ENCOUNTER — TELEPHONE (OUTPATIENT)
Dept: FAMILY MEDICINE | Facility: CLINIC | Age: 70
End: 2025-01-07
Payer: MEDICARE

## 2025-01-07 NOTE — TELEPHONE ENCOUNTER
Pt needs to come in for visit so that I can recheck bp and determine which of her HTN meds she is taking. She has several on her list and need verify. Schedule visit

## 2025-01-07 NOTE — TELEPHONE ENCOUNTER
----- Message from Rossana sent at 1/7/2025 11:32 AM CST -----  Regarding: bp  Pt canceled her bp check for today because she had a bad headache. She wanted to let Mrs. Rasmussen know that her bp is still high, and if she could please call in a different medication to [facility] in [place].

## 2025-01-08 NOTE — TELEPHONE ENCOUNTER
Spoke with pt and let her know Valery can't adjust her meds over the phone. Pt agreed to come in as soon as she is able.

## 2025-01-21 NOTE — PROGRESS NOTES
She Disclaimer: This note has been generated using voice-recognition software. There may be typographical errors that have been missed during proof-reading        Patient ID: Ade Grant is a 70 y.o. female.      Chief Complaint: No chief complaint on file.      70-year-old female returns for re-evaluation of worsening lower back and right knee pain.  Her pain has significantly increased with recent weather changes.  The lower back pain is primarily axial with right lumbar radicular symptoms to the knee.  She has right knee pain following a total knee arthroplasty several years ago at [facility], but  has not returned for an ortho re-evaluation.  She is awaiting cataract surgery and has a re-evaluation ophthalmology February 17, 2025. She also has an appointment with cardiologist Dr. Adame in [place] for valvular heart disease.  She returns for medication refill.  She declines physical therapy or nerve block injections at this time due to multiple comorbidities.                   A's of Opioid Risk Assessment  Activity:Patient has difficulty performing ADL.   Analgesia:Patients pain is not controlled by current medication.   Adverse Effects: Patient denies constipation or sedation.  Aberrant Behavior:  reviewed with no aberrant drug seeking/taking behavior.      Patient denies any suicidal or homicidal ideations    Physical Therapy/Home Exercise: no     Cardiac Monitor - 3-15 Day Adult (Cupid Only)    Predominant normal sinus rhythm. No significant cardiac arrythmias to   explain syncope.    Patient had a min HR of 61 bpm, max HR of 162 bpm, and avg HR of 78 bpm.   Predominant underlying rhythm was Sinus Rhythm. First Degree AV Block was   present. Bundle Branch Block/IVCD was present. 3 Supraventricular   Tachycardia runs occurred, the run with the fastest interval lasting 6   beats with a max rate of 162 bpm, the longest lasting 17 beats with an avg   rate of 134 bpm. Isolated SVEs were rare (<1.0%,  1093), and no SVE   Couplets or SVE Triplets were present. Isolated VEs were rare (<1.0%,   250), VE Couplets were rare (<1.0%, 44), and no VE Triplets were present.      Review of Systems   Constitutional: Negative.    HENT: Negative.     Eyes: Negative.    Respiratory: Negative.     Cardiovascular: Negative.    Gastrointestinal: Negative.    Endocrine: Negative.    Genitourinary: Negative.    Musculoskeletal:  Positive for arthralgias and back pain.   Integumentary:  Negative.   Neurological: Negative.    Hematological: Negative.    Psychiatric/Behavioral: Negative.               Past Medical History:   Diagnosis Date    Anxiety     Cervical radiculopathy     Chest pain 07/15/2016    Chronic pain syndrome     COPD (chronic obstructive pulmonary disease)     Depression     Dyspnea 07/15/2016    Enlarged heart     GERD (gastroesophageal reflux disease)     Hyperlipidemia     Hypertension     Lumbosacral radiculopathy     Obstructive sleep apnea     Other long term (current) drug therapy     Presence of right artificial knee joint     Vitamin D deficiency      Past Surgical History:   Procedure Laterality Date    ARTHROSCOPY OF HIP Left 1992    BACK SURGERY      CAUDAL EPIDURAL STEROID INJECTION N/A 06/04/2018 2/5/2018    Dr Hawkins    ECHOCARDIOGRAM,TRANSESOPHAGEAL N/A 08/23/2023    Procedure: Transesophageal echo (SUSAN) intra-procedure log documentation;  Surgeon: Kate Ramirez MD;  Location: [facility] CATH LAB;  Service: Cardiology;  Laterality: N/A;    HYSTERECTOMY      INJECTION OF ANESTHETIC AGENT INTO SACROILIAC JOINT Bilateral 12/15/2022    Procedure: BLOCK, SACROILIAC JOINT   BILATERAL;  Surgeon: Riya Armas MD;  Location: [facility] PAIN MGMT;  Service: Pain Management;  Laterality: Bilateral;    KNEE ARTHROSCOPY Right     x2    LUMBAR FUSION      TUBAL LIGATION       Social History     Socioeconomic History    Marital status:     Number of children: 3   Tobacco Use    Smoking status: Every Day      Current packs/day: 1.00     Average packs/day: 1 pack/day for 56.1 years (56.1 ttl pk-yrs)     Types: Cigarettes     Start date: 1969    Smokeless tobacco: Never   Substance and Sexual Activity    Alcohol use: Not Currently    Drug use: Yes     Types: Hydrocodone    Sexual activity: Not Currently     Social Drivers of Health     Financial Resource Strain: Low Risk  (12/18/2024)    Overall Financial Resource Strain (CARDIA)     Difficulty of Paying Living Expenses: Not very hard   Food Insecurity: No Food Insecurity (12/18/2024)    Hunger Vital Sign     Worried About Running Out of Food in the Last Year: Never true     Ran Out of Food in the Last Year: Never true   Transportation Needs: No Transportation Needs (12/18/2024)    PRAPARE - Transportation     Lack of Transportation (Medical): No     Lack of Transportation (Non-Medical): No   Physical Activity: Inactive (12/18/2024)    Exercise Vital Sign     Days of Exercise per Week: 0 days     Minutes of Exercise per Session: 0 min   Stress: Stress Concern Present (12/18/2024)    [employer] - Occupational Stress Questionnaire     Feeling of Stress : Rather much   Housing Stability: Low Risk  (12/18/2024)    Housing Stability Vital Sign     Unable to Pay for Housing in the Last Year: No     Homeless in the Last Year: No     Family History   Problem Relation Name Age of Onset    Cancer Mother      Heart disease Mother      Hypertension Mother      Heart disease Father      Thyroid cancer Brother      Bone cancer Brother      Colon cancer Brother      Thyroid cancer Other aunt     Cancer Other uncle      Review of patient's allergies indicates:   Allergen Reactions    Fenofibrate nanocrystallized Rash     has a current medication list which includes the following prescription(s): blood pressure test kit-large, clopidogrel, cyclobenzaprine, gabapentin, hydrocodone-acetaminophen, lisinopril, lisinopril-hydrochlorothiazide, metoprolol succinate,  potassium chloride sa, rosuvastatin, and sertraline.      Objective:  There were no vitals filed for this visit.     Physical Exam  Vitals and nursing note reviewed.   Constitutional:       General: She is not in acute distress.     Appearance: Normal appearance. She is not ill-appearing, toxic-appearing or diaphoretic.   HENT:      Head: Normocephalic and atraumatic.      Nose: Nose normal.      Mouth/Throat:      Mouth: Mucous membranes are moist.   Eyes:      Extraocular Movements: Extraocular movements intact.      Pupils: Pupils are equal, round, and reactive to light.   Cardiovascular:      Rate and Rhythm: Normal rate and regular rhythm.      Heart sounds: Normal heart sounds.   Pulmonary:      Effort: Pulmonary effort is normal. No respiratory distress.      Breath sounds: Normal breath sounds. No stridor. No wheezing or rhonchi.   Abdominal:      General: Bowel sounds are normal.      Palpations: Abdomen is soft.   Musculoskeletal:         General: No swelling or deformity.      Cervical back: Normal and normal range of motion. No spasms or tenderness. No pain with movement. Normal range of motion.      Thoracic back: Normal.      Lumbar back: Tenderness and bony tenderness present. No spasms. Decreased range of motion. Negative right straight leg raise test and negative left straight leg raise test. No scoliosis.      Right lower leg: No edema.      Left lower leg: No edema.   Skin:     General: Skin is warm.   Neurological:      General: No focal deficit present.      Mental Status: She is alert and oriented to person, place, and time. Mental status is at baseline.      Cranial Nerves: No cranial nerve deficit.      Sensory: Sensation is intact. No sensory deficit.      Motor: No weakness.      Coordination: Coordination normal.      Gait: Gait normal.      Deep Tendon Reflexes: Reflexes are normal and symmetric.   Psychiatric:         Mood and Affect: Mood normal.         Behavior: Behavior normal.            Assessment:      1. Lumbosacral radiculopathy    2. Cervical radiculopathy    3. Sacroiliitis    4. Back muscle spasm          Plan:  1. reviewed  2.Addiction, Dependency, Tolerance, Opioid abuse-misuse, Death, Diversion Discussed. Overdose reversal drug Naloxone discussed. Patient is prescribed opiates for chronic nonmalignant pain pathology.  Patient is receiving opiates which require greater than a 72 hour supply of therapy.  Patient was educated on potential dependency associated with long-term opioid use as well as decreasing efficacy with prolonged use.  Patient was advised of risks, benefits and side effects and how to utilize each medication.  Patient was also informed that any deviation from therapy protocol will  lead to discontinuation of opiates.  It is reasonable to prescribe opioid analgesics for patient based on positive response to opioid medications, lack of side effects and  limited aberrant behavior.    3.Refill/Continue medications for pain control and function       Requested Prescriptions     Pending Prescriptions Disp Refills    HYDROcodone-acetaminophen (NORCO)  mg per tablet 90 tablet 0     Sig: Take 1 tablet by mouth every 8 (eight) hours as needed for Pain (pain).    HYDROcodone-acetaminophen (NORCO)  mg per tablet 90 tablet 0     Sig: Take 1 tablet by mouth every 8 (eight) hours as needed for Pain (pain).    HYDROcodone-acetaminophen (NORCO)  mg per tablet 90 tablet 0     Sig: Take 1 tablet by mouth every 8 (eight) hours as needed for Pain (pain).    gabapentin (NEURONTIN) 600 MG tablet 90 tablet 2     Sig: Take 1 tablet (600 mg total) by mouth every 8 (eight) hours.    cyclobenzaprine (FLEXERIL) 10 MG tablet 270 tablet 0     Sig: Take 1 tablet (10 mg total) by mouth every 8 (eight) hours.     4.Patient defers nerve block injections, physical therapy or surgical consultation     5.Follow with [facility] in 3 months for re-evaluation and medication  refill       report:  Reviewed and consistent with medication use as prescribed.      The total time spent for evaluation and management on 01/21/2025 including reviewing separately obtained history, performing a medically appropriate exam and evaluation, documenting clinical information in the health record, independently interpreting results and communicating them to the patient/family/caregiver, and ordering medications/tests/procedures was between 15-29 minutes.    The above plan and management options were discussed at length with patient. Patient is in agreement with the above and verbalized understanding. It will be communicated with the referring physician via electronic record, fax, or mail.

## 2025-01-22 ENCOUNTER — OFFICE VISIT (OUTPATIENT)
Dept: PAIN MEDICINE | Facility: CLINIC | Age: 70
End: 2025-01-22
Payer: MEDICARE

## 2025-01-22 VITALS
WEIGHT: 162 LBS | BODY MASS INDEX: 28.7 KG/M2 | DIASTOLIC BLOOD PRESSURE: 102 MMHG | SYSTOLIC BLOOD PRESSURE: 210 MMHG | RESPIRATION RATE: 18 BRPM | HEART RATE: 68 BPM | HEIGHT: 63 IN

## 2025-01-22 DIAGNOSIS — Z79.899 ENCOUNTER FOR LONG-TERM (CURRENT) USE OF HIGH-RISK MEDICATION: ICD-10-CM

## 2025-01-22 DIAGNOSIS — M46.1 SACROILIITIS: ICD-10-CM

## 2025-01-22 DIAGNOSIS — M54.17 LUMBOSACRAL RADICULOPATHY: Primary | Chronic | ICD-10-CM

## 2025-01-22 DIAGNOSIS — M62.830 BACK MUSCLE SPASM: ICD-10-CM

## 2025-01-22 DIAGNOSIS — M54.12 CERVICAL RADICULOPATHY: Chronic | ICD-10-CM

## 2025-01-22 PROCEDURE — 99214 OFFICE O/P EST MOD 30 MIN: CPT | Mod: PBBFAC | Performed by: PAIN MEDICINE

## 2025-01-22 PROCEDURE — 99999 PR PBB SHADOW E&M-EST. PATIENT-LVL IV: CPT | Mod: PBBFAC,,, | Performed by: PAIN MEDICINE

## 2025-01-22 PROCEDURE — 80305 DRUG TEST PRSMV DIR OPT OBS: CPT | Mod: PBBFAC | Performed by: PAIN MEDICINE

## 2025-01-22 PROCEDURE — 3008F BODY MASS INDEX DOCD: CPT | Mod: CPTII,,, | Performed by: PAIN MEDICINE

## 2025-01-22 PROCEDURE — 1101F PT FALLS ASSESS-DOCD LE1/YR: CPT | Mod: CPTII,,, | Performed by: PAIN MEDICINE

## 2025-01-22 PROCEDURE — 3080F DIAST BP >= 90 MM HG: CPT | Mod: CPTII,,, | Performed by: PAIN MEDICINE

## 2025-01-22 PROCEDURE — 4010F ACE/ARB THERAPY RXD/TAKEN: CPT | Mod: CPTII,,, | Performed by: PAIN MEDICINE

## 2025-01-22 PROCEDURE — 99999PBSHW POCT URINE DRUG SCREEN PRESUMP: Mod: PBBFAC,,,

## 2025-01-22 PROCEDURE — 1125F AMNT PAIN NOTED PAIN PRSNT: CPT | Mod: CPTII,,, | Performed by: PAIN MEDICINE

## 2025-01-22 PROCEDURE — 3077F SYST BP >= 140 MM HG: CPT | Mod: CPTII,,, | Performed by: PAIN MEDICINE

## 2025-01-22 PROCEDURE — 99214 OFFICE O/P EST MOD 30 MIN: CPT | Mod: S$PBB,,, | Performed by: PAIN MEDICINE

## 2025-01-22 PROCEDURE — 1159F MED LIST DOCD IN RCRD: CPT | Mod: CPTII,,, | Performed by: PAIN MEDICINE

## 2025-01-22 PROCEDURE — 3288F FALL RISK ASSESSMENT DOCD: CPT | Mod: CPTII,,, | Performed by: PAIN MEDICINE

## 2025-01-22 RX ORDER — CYCLOBENZAPRINE HCL 10 MG
10 TABLET ORAL EVERY 8 HOURS
Qty: 270 TABLET | Refills: 0 | Status: SHIPPED | OUTPATIENT
Start: 2025-01-22 | End: 2025-04-22

## 2025-01-22 RX ORDER — HYDROCODONE BITARTRATE AND ACETAMINOPHEN 10; 325 MG/1; MG/1
1 TABLET ORAL EVERY 8 HOURS PRN
Qty: 90 TABLET | Refills: 0 | Status: SHIPPED | OUTPATIENT
Start: 2025-01-28 | End: 2025-01-27 | Stop reason: SDUPTHER

## 2025-01-22 RX ORDER — HYDROCODONE BITARTRATE AND ACETAMINOPHEN 10; 325 MG/1; MG/1
1 TABLET ORAL EVERY 8 HOURS PRN
Qty: 90 TABLET | Refills: 0 | Status: SHIPPED | OUTPATIENT
Start: 2025-03-29 | End: 2025-01-27 | Stop reason: SDUPTHER

## 2025-01-22 RX ORDER — GABAPENTIN 600 MG/1
600 TABLET ORAL EVERY 8 HOURS
Qty: 90 TABLET | Refills: 2 | Status: SHIPPED | OUTPATIENT
Start: 2025-01-22

## 2025-01-22 RX ORDER — HYDROCODONE BITARTRATE AND ACETAMINOPHEN 10; 325 MG/1; MG/1
1 TABLET ORAL EVERY 8 HOURS PRN
Qty: 90 TABLET | Refills: 0 | Status: SHIPPED | OUTPATIENT
Start: 2025-02-27 | End: 2025-01-27 | Stop reason: SDUPTHER

## 2025-01-27 ENCOUNTER — OFFICE VISIT (OUTPATIENT)
Dept: FAMILY MEDICINE | Facility: CLINIC | Age: 70
End: 2025-01-27
Payer: MEDICARE

## 2025-01-27 VITALS
DIASTOLIC BLOOD PRESSURE: 97 MMHG | OXYGEN SATURATION: 95 % | HEIGHT: 63 IN | SYSTOLIC BLOOD PRESSURE: 181 MMHG | RESPIRATION RATE: 18 BRPM | BODY MASS INDEX: 28.21 KG/M2 | WEIGHT: 159.19 LBS | TEMPERATURE: 98 F | HEART RATE: 66 BPM

## 2025-01-27 DIAGNOSIS — F17.200 SMOKER UNMOTIVATED TO QUIT: Chronic | ICD-10-CM

## 2025-01-27 DIAGNOSIS — I10 PRIMARY HYPERTENSION: Primary | Chronic | ICD-10-CM

## 2025-01-27 DIAGNOSIS — J44.9 CHRONIC OBSTRUCTIVE PULMONARY DISEASE, UNSPECIFIED COPD TYPE: Chronic | ICD-10-CM

## 2025-01-27 DIAGNOSIS — I50.9 HEART FAILURE, UNSPECIFIED HF CHRONICITY, UNSPECIFIED HEART FAILURE TYPE: Chronic | ICD-10-CM

## 2025-01-27 PROCEDURE — 1159F MED LIST DOCD IN RCRD: CPT | Mod: ,,, | Performed by: NURSE PRACTITIONER

## 2025-01-27 PROCEDURE — 99214 OFFICE O/P EST MOD 30 MIN: CPT | Mod: ,,, | Performed by: NURSE PRACTITIONER

## 2025-01-27 PROCEDURE — 3008F BODY MASS INDEX DOCD: CPT | Mod: ,,, | Performed by: NURSE PRACTITIONER

## 2025-01-27 PROCEDURE — 3077F SYST BP >= 140 MM HG: CPT | Mod: ,,, | Performed by: NURSE PRACTITIONER

## 2025-01-27 PROCEDURE — 3288F FALL RISK ASSESSMENT DOCD: CPT | Mod: ,,, | Performed by: NURSE PRACTITIONER

## 2025-01-27 PROCEDURE — 1125F AMNT PAIN NOTED PAIN PRSNT: CPT | Mod: ,,, | Performed by: NURSE PRACTITIONER

## 2025-01-27 PROCEDURE — 1101F PT FALLS ASSESS-DOCD LE1/YR: CPT | Mod: ,,, | Performed by: NURSE PRACTITIONER

## 2025-01-27 PROCEDURE — 3080F DIAST BP >= 90 MM HG: CPT | Mod: ,,, | Performed by: NURSE PRACTITIONER

## 2025-01-27 PROCEDURE — 4010F ACE/ARB THERAPY RXD/TAKEN: CPT | Mod: ,,, | Performed by: NURSE PRACTITIONER

## 2025-01-27 RX ORDER — DOXYCYCLINE HYCLATE 100 MG
100 TABLET ORAL EVERY 12 HOURS
COMMUNITY
Start: 2024-12-31 | End: 2025-01-27

## 2025-01-27 RX ORDER — HYDROCHLOROTHIAZIDE 12.5 MG/1
12.5 TABLET ORAL DAILY
Qty: 30 TABLET | Refills: 11 | Status: SHIPPED | OUTPATIENT
Start: 2025-01-27 | End: 2026-01-27

## 2025-01-27 RX ORDER — PNEUMOCOCCAL 21-VALENT CONJUGATE VACCINE 65; 4; 4; 4; 4; 4; 4; 4; 4; 4; 4; 4; 4; 4; 4; 4; 4; 4; 4; 4; 4; 4 UG/.5ML; UG/.5ML; UG/.5ML; UG/.5ML; UG/.5ML; UG/.5ML; UG/.5ML; UG/.5ML; UG/.5ML; UG/.5ML; UG/.5ML; UG/.5ML; UG/.5ML; UG/.5ML; UG/.5ML; UG/.5ML; UG/.5ML; UG/.5ML; UG/.5ML; UG/.5ML; UG/.5ML; UG/.5ML
INJECTION, SOLUTION INTRAMUSCULAR
COMMUNITY
Start: 2024-11-15

## 2025-01-27 RX ORDER — METOPROLOL SUCCINATE 100 MG/1
200 TABLET, EXTENDED RELEASE ORAL DAILY
Qty: 120 TABLET | Refills: 0 | Status: SHIPPED | OUTPATIENT
Start: 2025-01-27 | End: 2026-01-27

## 2025-01-27 NOTE — PROGRESS NOTES
DMITRY Medina   [facility]  [address]   [place] [address]     PATIENT NAME: Ade Grant  : 1955  DATE: 25  MRN: [id number]      Billing Provider: DMITRY Medina  Level of Service: [place] OFFICE/OUTPT VISIT, EST, TOBIL IV, 30-39 MIN  Patient PCP Information       Provider PCP Type    DMITRY Medina General              Reason for Visit / Chief Complaint: Hypertension (Following up on HTN and pt brought a written B/P Log.)       History of Present Illness / Problem Focused Workflow     History of Present Illness    CHIEF COMPLAINT:  Patient presents today for follow up of blood pressure concerns.    BLOOD PRESSURE:  She self-increased metoprolol from 100 mg to 200 mg daily and lisinopril from 10 mg to 20 mg daily about a week ago, denying any side effects from the higher doses. Recent blood pressure readings remain elevated, ranging from 143/81 to 184/116, with the lowest reading of 146/82 on 25. She reports blood pressure issues began when her previous doctor changed her medications, which initially caused significant blood pressure drop.    CARDIOVASCULAR HISTORY:  She has a history of arterial leak and underwent cardiac catheterization at [facility] in 2024.    CURRENT MEDICATIONS:  She takes metoprolol 200mg daily, lisinopril 20mg daily, Plavix 75mg daily, rosuvastatin 40mg daily, Zoloft 50mg daily for anxiety, gabapentin 600mg daily for leg neuropathy, and Flexeril for back pain.    LABS:  Chemistry panel from December shows excellent kidney function.      ROS:  General: -fever, -chills, -fatigue, -weight gain, -weight loss, -change in weight  Eyes: -vision changes, -redness, -discharge  ENT: -ear pain, -nasal congestion, -sore throat  Cardiovascular: -chest pain, -palpitations, -lower extremity edema  Respiratory: -cough, -shortness of breath  Gastrointestinal: -abdominal pain, -nausea, -vomiting, -diarrhea, -constipation, -blood in stool  Genitourinary: -dysuria,  -hematuria, -frequency  Musculoskeletal: -joint pain, -muscle pain  Skin: -rash, -lesion  Neurological: -headache, -dizziness, -numbness, -tingling  Psychiatric: -anxiety, -depression, -sleep difficulty           Medical / Social / Family History     Past Medical History:   Diagnosis Date    Anxiety     Cervical radiculopathy     Chest pain 07/15/2016    Chronic pain syndrome     COPD (chronic obstructive pulmonary disease)     Depression     Dyspnea 07/15/2016    Enlarged heart     GERD (gastroesophageal reflux disease)     Hyperlipidemia     Hypertension     Lumbosacral radiculopathy     Obstructive sleep apnea     Other long term (current) drug therapy     Presence of right artificial knee joint     Vitamin D deficiency        Past Surgical History:   Procedure Laterality Date    ARTHROSCOPY OF HIP Left 1992    BACK SURGERY      CAUDAL EPIDURAL STEROID INJECTION N/A 06/04/2018 2/5/2018    Dr Hawkins    ECHOCARDIOGRAM,TRANSESOPHAGEAL N/A 08/23/2023    Procedure: Transesophageal echo (SUSAN) intra-procedure log documentation;  Surgeon: Kate Ramirez MD;  Location[email or web address] C[facility] LAB;  Service: Cardiology;  Laterality: N/A;    HYSTERECTOMY      INJECTION OF ANESTHETIC AGENT INTO SACROILIAC JOINT Bilateral 12/15/2022    Procedure: BLOCK, SACROILIAC JOINT   BILATERAL;  Surgeon: Riya Armas MD;  Location: [facility];  Service: Pain Management;  Laterality: Bilateral;    KNEE ARTHROSCOPY Right     x2    LUMBAR FUSION      TUBAL LIGATION         Social History  Ms.  reports that she has been smoking cigarettes. She started smoking about 56 years ago. She has a 56.1 pack-year smoking history. She has never used smokeless tobacco. She reports that she does not currently use alcohol. She reports current drug use. Drug: Hydrocodone.    Family History  Ms.'s family history includes Bone cancer in her brother; Cancer in her mother and another family member; Colon cancer in her brother; Heart disease in her father and  "mother; Hypertension in her mother; Thyroid cancer in her brother and another family member.    Medications and Allergies     Medications  Outpatient Medications Marked as Taking for the 1/27/25 encounter (Office Visit) with Valery Reynoso FNP   Medication Sig Dispense Refill    blood pressure test kit-large Kit 1 kit by Misc.(Non-Drug; Combo Route) route once daily. 1 each 0    CAPVAXIVE 0.5 mL Syrg Inject into the muscle.      clopidogreL (PLAVIX) 75 mg tablet Take 1 tablet (75 mg total) by mouth once daily. 90 tablet 1    cyclobenzaprine (FLEXERIL) 10 MG tablet Take 1 tablet (10 mg total) by mouth every 8 (eight) hours. 270 tablet 0    gabapentin (NEURONTIN) 600 MG tablet Take 1 tablet (600 mg total) by mouth every 8 (eight) hours. 90 tablet 2    HYDROcodone-acetaminophen (NORCO)  mg per tablet Take 1 tablet by mouth every 8 (eight) hours as needed for Pain. 90 tablet 0    lisinopriL 10 MG tablet Take 1 tablet (10 mg total) by mouth once daily. 30 tablet 1    potassium chloride SA (K-DUR,KLOR-CON) 20 MEQ tablet Take 1 tablet (20 mEq total) by mouth once daily. 90 tablet 10    rosuvastatin (CRESTOR) 40 MG Tab Take 1 tablet (40 mg total) by mouth nightly. 90 tablet 1    sertraline (ZOLOFT) 50 MG tablet Take 1 tablet (50 mg total) by mouth once daily. 90 tablet 1    [DISCONTINUED] metoprolol succinate (TOPROL-XL) 100 MG 24 hr tablet Take 1 tablet (100 mg total) by mouth once daily. 90 tablet 3       Allergies  Review of patient's allergies indicates:   Allergen Reactions    Fenofibrate nanocrystallized Rash       Physical Examination     Vitals:    01/27/25 1430   BP: (!) 181/97   Pulse: 66   Resp: 18   Temp: 97.8 °F (36.6 °C)   TempSrc: Oral   SpO2: 95%   Weight: 72.2 kg (159 lb 3.2 oz)   Height: 5' 3" (1.6 m)        Physical Exam    Vitals: Blood pressure readings: 177/104, 165/104, 166/95, 161/95, 143/81, 146/82, 179/114, 184/116, 174/107, 172 systolic, 166 systolic, 162/101, 148/92.  General: No acute " distress. Well-developed. Well-nourished.  Eyes: EOMI. Sclerae anicteric.  HENT: Normocephalic. Atraumatic. Nares patent. Moist oral mucosa.  Ears: Bilateral TMs clear. Bilateral EACs clear.  Cardiovascular: Regular rate. Regular rhythm. No murmurs. No rubs. No gallops. Normal S1, S2.  Respiratory: Normal respiratory effort. Clear to auscultation bilaterally. No rales. No rhonchi. No wheezing.  Abdomen: Soft. Non-tender. Non-distended. Normoactive bowel sounds.  Musculoskeletal: No  obvious deformity.  Extremities: No lower extremity edema.  Neurological: Alert & oriented x3. No slurred speech. Normal gait.  Psychiatric: Normal mood. Normal affect. Good insight. Good judgment.  Skin: Warm. Dry. No rash.       Physical Exam     Assessment and Plan (including Health Maintenance)     :Assessment & Plan    IMPRESSION:  - Assessed patient's hypertension management, noting persistently elevated blood pressure readings despite recent medication dose increases  - Considered adding low-dose hydrochlorothiazide to current regimen of metoprolol and lisinopril to further reduce blood pressure  - Evaluated kidney function via recent lab results, confirming excellent renal status and supporting the addition of hydrochlorothiazide  - Concerned about potential for metoprolol to excessively lower heart rate at increased dose  - Reviewed recent cardiac workup results from [facility] , noting need for Dr. Adame to access these records for surgical evaluation    HYPERTENSION:  - Educated the patient on the importance of monitoring heart rate with increased metoprolol dose, particularly for readings below 60 bpm.  - Instructed the patient to watch for heart rate below 60 bpm and report if it occurs.  - Explained the dual effects of hydrochlorothiazide on fluid balance and blood pressure reduction.  - Advised the patient to continue monitoring blood pressure regularly, especially in the mornings and around 10 AM.  - Initiated  hydrochlorothiazide at a low dose for hypertension.  - Continued metoprolol 200 mg total daily (100 mg twice daily).  - Refilled metoprolol 100 mg tablets, #180 for 90 days.  - Continued lisinopril 20 mg daily.  - Instructed the patient to contact the office if blood pressure decreases significantly or heart rate drops too low.  - Advised the patient to report if blood pressure is improving, but emphasized the importance of coming in for documentation.  - Noted that the patient's blood pressure readings have been consistently high, with recent readings ranging from 143/81 to 184/116.  - Observed that the lowest blood pressure reading was 146/82 on 12/22/25.  - Assessed that blood pressure readings are still at stroke level, despite some improvement.  - Confirmed excellent kidney function, ruling out kidney issues as a cause of high blood pressure.    ANXIETY:  - Continued Zoloft 50 mg for anxiety management.    NEUROPATHY:  - Continued Gabapentin 600 mg for neuropathy in the legs.    BACK PAIN:  - Continued Flexeril for back pain management.    SMOKING:  - Noted that the patient is still smoking.    HYPERLIPIDEMIA:  - Continued Rosuvastatin 40 mg for hyperlipidemia management.    ARTERIAL DISEASE:  - Noted that the patient had arterial testing done at [facility] on 11/15.  - Referred the patient to Dr. Adame in [place], [place], to determine if surgery is needed.  - Planned to send a message to Dr. Adame's office to access the patient's test results and labs through [employer].  - Continued Plavix 75 mg daily.    FOLLOW UP:  - Scheduled follow up in 1 month to reassess blood pressure control and document medication efficacy.             Problem List Items Addressed This Visit       Hypertensive disorder - Primary    Overview     has not taken meds today  continue to smoke          Smoker unmotivated to quit (Chronic)    Overview     discussed risks of continued behavior. pt expressed understanding.          Chronic  obstructive pulmonary disease, unspecified    Overview     Noted by ZACKERY CHAVARRIA  last documented on [id number]         Heart failure, unspecified    Overview     Noted by [facility]  last documented on [id number]        .      Health Maintenance Due   Topic Date Due    TETANUS VACCINE  Never done    Colorectal Cancer Screening  Never done    LDCT Lung Screen  Never done    Shingles Vaccine (1 of 2) Never done    RSV Vaccine (Age 60+ and Pregnant patients) (1 - Risk 60-74 years 1-dose series) Never done    Pneumococcal Vaccines (Age 50+) (2 of 2 - PCV) 03/17/2023    DEXA Scan  08/28/2023    Mammogram  04/18/2024    COVID-19 Vaccine (1 - 2024-25 season) Never done     Health Maintenance Topics with due status: Not Due       Topic Last Completion Date    Hemoglobin A1c (Diabetic Prevention Screening) 12/18/2024    Lipid Panel 12/18/2024       Procedures     Future Appointments   Date Time Provider Department Center   3/4/2025  1:45 PM Kate Ramirez MD [facility]   4/22/2025  1:15 PM Greg Hernandez PA [facility]   12/24/2025  9:00 AM AWV NURSE, [facility]        Follow up in about 1 month (around 2/27/2025).     Signature:  DMITRY Medina    Date of encounter: 1/27/25      This note was generated with the assistance of ambient listening technology. Verbal consent was obtained by the patient and accompanying visitor(s) for the recording of patient appointment to facilitate this note. I attest to having reviewed and edited the generated note for accuracy, though some syntax or spelling errors may persist. Please contact the author of this note for any clarification.

## 2025-02-05 DIAGNOSIS — E78.5 DYSLIPIDEMIA: ICD-10-CM

## 2025-02-05 DIAGNOSIS — I10 PRIMARY HYPERTENSION: ICD-10-CM

## 2025-02-05 DIAGNOSIS — F32.A ANXIETY AND DEPRESSION: ICD-10-CM

## 2025-02-05 DIAGNOSIS — F41.9 ANXIETY AND DEPRESSION: ICD-10-CM

## 2025-02-05 DIAGNOSIS — E87.6 HYPOKALEMIA: ICD-10-CM

## 2025-02-05 DIAGNOSIS — I10 PRIMARY HYPERTENSION: Chronic | ICD-10-CM

## 2025-02-05 NOTE — TELEPHONE ENCOUNTER
Last seen 01/27/2025. Last labs 12/18/2025. Pt has has also requested Montelukast and I can't find this in her history.

## 2025-02-05 NOTE — TELEPHONE ENCOUNTER
----- Message from Temi sent at 2/5/2025  4:44 PM CST -----  Needs refills on clopiogrel, lisinopril, rosuvastatin, sertraline, montelukast, and potassium called in to [facility] pharmacy

## 2025-02-05 NOTE — TELEPHONE ENCOUNTER
----- Message from Temi sent at 2/5/2025 12:00 PM CST -----  Needs refills on all her medication called in to [facility] pharmacy

## 2025-02-06 ENCOUNTER — PATIENT OUTREACH (OUTPATIENT)
Facility: HOSPITAL | Age: 70
End: 2025-02-06
Payer: MEDICARE

## 2025-02-06 RX ORDER — CLOPIDOGREL BISULFATE 75 MG/1
75 TABLET ORAL DAILY
Qty: 90 TABLET | Refills: 1 | Status: SHIPPED | OUTPATIENT
Start: 2025-02-06

## 2025-02-06 RX ORDER — SERTRALINE HYDROCHLORIDE 50 MG/1
50 TABLET, FILM COATED ORAL DAILY
Qty: 90 TABLET | Refills: 1 | Status: SHIPPED | OUTPATIENT
Start: 2025-02-06 | End: 2025-02-10 | Stop reason: SDUPTHER

## 2025-02-06 RX ORDER — ROSUVASTATIN CALCIUM 40 MG/1
40 TABLET, COATED ORAL DAILY
Qty: 90 TABLET | Refills: 1 | Status: SHIPPED | OUTPATIENT
Start: 2025-02-06 | End: 2025-02-10 | Stop reason: SDUPTHER

## 2025-02-06 RX ORDER — POTASSIUM CHLORIDE 20 MEQ/1
20 TABLET, EXTENDED RELEASE ORAL DAILY
Qty: 90 TABLET | Refills: 3 | Status: SHIPPED | OUTPATIENT
Start: 2025-02-06 | End: 2025-02-10 | Stop reason: SDUPTHER

## 2025-02-06 RX ORDER — LISINOPRIL 10 MG/1
10 TABLET ORAL DAILY
Qty: 90 TABLET | Refills: 1 | Status: SHIPPED | OUTPATIENT
Start: 2025-02-06 | End: 2025-02-10 | Stop reason: SDUPTHER

## 2025-02-06 NOTE — PROGRESS NOTES
Population Health Chart Review & Patient Outreach Details    Updates Requested / Reviewed:  [x]  Care Team Updated      Health Maintenance Topics Addressed and Outreach Outcomes / Actions Taken:  Blood Pressure Control [x] Telephone outreach for remote BP. Telephone off. No vm

## 2025-02-07 ENCOUNTER — TELEPHONE (OUTPATIENT)
Dept: CARDIOLOGY | Facility: CLINIC | Age: 70
End: 2025-02-07
Payer: MEDICARE

## 2025-02-07 NOTE — TELEPHONE ENCOUNTER
Have left numerous messages to have patient call back about her visit with Dr. Adame.    Spoke with nurse at Dr. Adame's office.  She stated that they did have the referral notes, but just needed more information.  Requested records sent to Dr. Adame's office.     Message sent to Breann Noe about the above.       ----- Message from Nurse Breann sent at 1/27/2025  3:47 PM CST -----  Regarding: Dr. Adame referral  Good afternoon, we saw this pt today and it looks like Dr. Ramirez referred her to see Dr. Adame. She saw Dr. Adame on 1/13/2025, but she states that he didn't know what he was seeing her for d/t he never received any records from Dr. Ramirez. Can someone check on this please?

## 2025-02-10 DIAGNOSIS — E78.5 DYSLIPIDEMIA: ICD-10-CM

## 2025-02-10 DIAGNOSIS — E87.6 HYPOKALEMIA: ICD-10-CM

## 2025-02-10 DIAGNOSIS — F32.A ANXIETY AND DEPRESSION: ICD-10-CM

## 2025-02-10 DIAGNOSIS — I10 PRIMARY HYPERTENSION: Chronic | ICD-10-CM

## 2025-02-10 DIAGNOSIS — F41.9 ANXIETY AND DEPRESSION: ICD-10-CM

## 2025-02-10 DIAGNOSIS — I10 PRIMARY HYPERTENSION: ICD-10-CM

## 2025-02-10 RX ORDER — HYDROCHLOROTHIAZIDE 12.5 MG/1
12.5 TABLET ORAL DAILY
Qty: 90 TABLET | Refills: 1 | Status: SHIPPED | OUTPATIENT
Start: 2025-02-10 | End: 2026-02-10

## 2025-02-10 RX ORDER — SERTRALINE HYDROCHLORIDE 50 MG/1
50 TABLET, FILM COATED ORAL DAILY
Qty: 90 TABLET | Refills: 1 | Status: SHIPPED | OUTPATIENT
Start: 2025-02-10 | End: 2026-02-10

## 2025-02-10 RX ORDER — LISINOPRIL 20 MG/1
20 TABLET ORAL DAILY
Qty: 90 TABLET | Refills: 1 | Status: SHIPPED | OUTPATIENT
Start: 2025-02-10

## 2025-02-10 RX ORDER — ROSUVASTATIN CALCIUM 40 MG/1
40 TABLET, COATED ORAL DAILY
Qty: 90 TABLET | Refills: 1 | Status: SHIPPED | OUTPATIENT
Start: 2025-02-10 | End: 2026-02-10

## 2025-02-10 RX ORDER — LISINOPRIL AND HYDROCHLOROTHIAZIDE 20; 25 MG/1; MG/1
1 TABLET ORAL DAILY
Qty: 90 TABLET | Refills: 1 | Status: CANCELLED | OUTPATIENT
Start: 2025-02-10

## 2025-02-10 RX ORDER — METOPROLOL SUCCINATE 100 MG/1
200 TABLET, EXTENDED RELEASE ORAL DAILY
Qty: 180 TABLET | Refills: 1 | Status: SHIPPED | OUTPATIENT
Start: 2025-02-10 | End: 2026-02-10

## 2025-02-10 RX ORDER — METOPROLOL SUCCINATE 200 MG/1
200 TABLET, EXTENDED RELEASE ORAL DAILY
Qty: 90 TABLET | Refills: 1 | Status: SHIPPED | OUTPATIENT
Start: 2025-02-10 | End: 2025-02-10 | Stop reason: DRUGHIGH

## 2025-02-10 RX ORDER — POTASSIUM CHLORIDE 20 MEQ/1
20 TABLET, EXTENDED RELEASE ORAL DAILY
Qty: 90 TABLET | Refills: 3 | Status: SHIPPED | OUTPATIENT
Start: 2025-02-10 | End: 2026-02-10

## 2025-02-10 NOTE — TELEPHONE ENCOUNTER
----- Message from Rossana sent at 2/10/2025 10:37 AM CST -----  Regarding: refill  Pt needs a refill on metoprolol 200mg and lisinopril 20 mg sent to [facility].

## 2025-02-17 ENCOUNTER — TELEPHONE (OUTPATIENT)
Dept: CARDIOLOGY | Facility: CLINIC | Age: 70
End: 2025-02-17
Payer: MEDICARE

## 2025-02-17 NOTE — TELEPHONE ENCOUNTER
Patient called wanting to speak to nurse again.  Returned call with no answer and left voicemail.

## 2025-04-16 NOTE — PROGRESS NOTES
Subjective:         Patient ID: Ade Grant is a 70 y.o. female.    Chief Complaint: Back Pain      Pain  This is a chronic problem. The current episode started more than 1 year ago. The problem occurs daily. The problem has been waxing and waning. Associated symptoms include arthralgias, myalgias and neck pain. Pertinent negatives include no anorexia, chest pain, chills, diaphoresis, fever, sore throat, urinary symptoms, vertigo, visual change or vomiting.     Review of Systems   Constitutional:  Negative for activity change, appetite change, chills, diaphoresis, fever and unexpected weight change.   HENT:  Negative for drooling, ear discharge, ear pain, facial swelling, mouth sores, nosebleeds, sore throat, trouble swallowing, voice change and goiter.    Eyes:  Negative for photophobia, pain, discharge, redness and visual disturbance.   Respiratory:  Negative for apnea, choking, chest tightness, shortness of breath, wheezing and stridor.    Cardiovascular:  Negative for chest pain, palpitations and leg swelling.   Gastrointestinal:  Negative for abdominal distention, anorexia, diarrhea, rectal pain, vomiting and fecal incontinence.   Endocrine: Negative for cold intolerance, heat intolerance, polydipsia, polyphagia and polyuria.   Genitourinary:  Negative for bladder incontinence, dysuria, flank pain, frequency and hot flashes.   Musculoskeletal:  Positive for arthralgias, back pain, leg pain, myalgias and neck pain.   Integumentary:  Negative for color change and pallor.   Allergic/Immunologic: Negative for immunocompromised state.   Neurological:  Negative for dizziness, vertigo, seizures, syncope, facial asymmetry, speech difficulty, light-headedness, coordination difficulties and memory loss.   Hematological:  Negative for adenopathy. Does not bruise/bleed easily.   Psychiatric/Behavioral:  Negative for agitation, behavioral problems, confusion, decreased concentration, dysphoric mood, hallucinations,  self-injury and suicidal ideas. The patient is not nervous/anxious and is not hyperactive.            Past Medical History:   Diagnosis Date    Anxiety     Cervical radiculopathy     Chest pain 07/15/2016    Chronic pain syndrome     COPD (chronic obstructive pulmonary disease)     Depression     Dyspnea 07/15/2016    Enlarged heart     GERD (gastroesophageal reflux disease)     Hyperlipidemia     Hypertension     Lumbosacral radiculopathy     Obstructive sleep apnea     Other long term (current) drug therapy     Presence of right artificial knee joint     Vitamin D deficiency      Past Surgical History:   Procedure Laterality Date    ARTHROSCOPY OF HIP Left 1992    BACK SURGERY      CAUDAL EPIDURAL STEROID INJECTION N/A 06/04/2018 2/5/2018    Dr Hawkins    ECHOCARDIOGRAM,TRANSESOPHAGEAL N/A 08/23/2023    Procedure: Transesophageal echo (SUSAN) intra-procedure log documentation;  Surgeon: Kate Ramirez MD;  Location: [facility] CATH LAB;  Service: Cardiology;  Laterality: N/A;    HYSTERECTOMY      INJECTION OF ANESTHETIC AGENT INTO SACROILIAC JOINT Bilateral 12/15/2022    Procedure: BLOCK, SACROILIAC JOINT   BILATERAL;  Surgeon: Riya Armas MD;  [facility];  Service: Pain Management;  Laterality: Bilateral;    KNEE ARTHROSCOPY Right     x2    LUMBAR FUSION      TUBAL LIGATION       Social History     Socioeconomic History    Marital status:     Number of children: 3   Tobacco Use    Smoking status: Every Day     Current packs/day: 1.00     Average packs/day: 1 pack/day for 56.3 years (56.3 ttl pk-yrs)     Types: Cigarettes     Start date: 1969    Smokeless tobacco: Never   Substance and Sexual Activity    Alcohol use: Not Currently    Drug use: Yes     Types: Hydrocodone    Sexual activity: Not Currently     Social Drivers of Health     Financial Resource Strain: Low Risk  (12/18/2024)    Overall Financial Resource Strain (CARDIA)     Difficulty of Paying Living Expenses: Not very hard   Food  "Insecurity: No Food Insecurity (12/18/2024)    Hunger Vital Sign     Worried About Running Out of Food in the Last Year: Never true     Ran Out of Food in the Last Year: Never true   Transportation Needs: No Transportation Needs (12/18/2024)    PRAPARE - Transportation     Lack of Transportation (Medical): No     Lack of Transportation (Non-Medical): No   Physical Activity: Inactive (12/18/2024)    Exercise Vital Sign     Days of Exercise per Week: 0 days     Minutes of Exercise per Session: 0 min   Stress: Stress Concern Present (12/18/2024)    [employer] - Occupational Stress Questionnaire     Feeling of Stress : Rather much   Housing Stability: Unknown (12/18/2024)    Housing Stability Vital Sign     Unable to Pay for Housing in the Last Year: No     Homeless in the Last Year: No     Family History   Problem Relation Name Age of Onset    Cancer Mother      Heart disease Mother      Hypertension Mother      Heart disease Father      Thyroid cancer Brother      Bone cancer Brother      Colon cancer Brother      Thyroid cancer Other aunt     Cancer Other uncle      Review of patient's allergies indicates:   Allergen Reactions    Fenofibrate nanocrystallized Rash        Objective:  Vitals:    04/22/25 1307 04/22/25 1308   BP: (!) 163/80    Pulse: 66    Resp: 18    Weight: 72.1 kg (159 lb)    Height: 5' 3" (1.6 m)    PainSc:   4   4                 Physical Exam  Vitals and nursing note reviewed. Exam conducted with a chaperone present.   Constitutional:       General: She is awake. She is not in acute distress.     Appearance: Normal appearance. She is not ill-appearing or toxic-appearing.   HENT:      Head: Normocephalic and atraumatic.      Nose: Nose normal.      Mouth/Throat:      Mouth: Mucous membranes are moist.      Pharynx: Oropharynx is clear.   Eyes:      Conjunctiva/sclera: Conjunctivae normal.      Pupils: Pupils are equal, round, and reactive to light.   Cardiovascular:     "  Rate and Rhythm: Normal rate.   Pulmonary:      Effort: Pulmonary effort is normal. No respiratory distress.   Abdominal:      Palpations: Abdomen is soft.      Tenderness: There is no guarding.   Musculoskeletal:         General: Normal range of motion.      Cervical back: Normal range of motion and neck supple. Tenderness present. No rigidity.      Thoracic back: Tenderness present.      Lumbar back: Tenderness present.   Skin:     General: Skin is warm and dry.      Coloration: Skin is not jaundiced or pale.   Neurological:      General: No focal deficit present.      Mental Status: She is alert and oriented to person, place, and time. Mental status is at baseline.      Cranial Nerves: No cranial nerve deficit (II-XII).   Psychiatric:         Mood and Affect: Mood normal.         Behavior: Behavior normal. Behavior is cooperative.         Thought Content: Thought content normal.           Cardiac Monitor - 3-15 Day Adult (Cupid Only)    Predominant normal sinus rhythm. No significant cardiac arrythmias to   explain syncope.    Patient had a min HR of 61 bpm, max HR of 162 bpm, and avg HR of 78 bpm.   Predominant underlying rhythm was Sinus Rhythm. First Degree AV Block was   present. Bundle Branch Block/IVCD was present. 3 Supraventricular   Tachycardia runs occurred, the run with the fastest interval lasting 6   beats with a max rate of 162 bpm, the longest lasting 17 beats with an avg   rate of 134 bpm. Isolated SVEs were rare (<1.0%, 1093), and no SVE   Couplets or SVE Triplets were present. Isolated VEs were rare (<1.0%,   250), VE Couplets were rare (<1.0%, 44), and no VE Triplets were present.       Office Visit on 01/22/2025   Component Date Value Ref Range Status    POC Amphetamines 01/22/2025 Negative  Negative, Inconclusive Final    POC Barbiturates 01/22/2025 Negative  Negative, Inconclusive Final    POC Benzodiazepines 01/22/2025 Negative  Negative, Inconclusive Final    POC Cocaine 01/22/2025  Negative  Negative, Inconclusive Final    POC THC 01/22/2025 Negative  Negative, Inconclusive Final    POC Methadone 01/22/2025 Negative  Negative, Inconclusive Final    POC Methamphetamine 01/22/2025 Negative  Negative, Inconclusive Final    POC Opiates 01/22/2025 Presumptive Positive (A)  Negative, Inconclusive Final    POC Oxycodone 01/22/2025 Negative  Negative, Inconclusive Final    POC Phencyclidine 01/22/2025 Negative  Negative, Inconclusive Final    POC Methylenedioxymethamphetamine * 01/22/2025 Negative  Negative, Inconclusive Final    POC Tricyclic Antidepressants 01/22/2025 Negative  Negative, Inconclusive Final    POC Buprenorphine 01/22/2025 Negative   Final     Acceptable 01/22/2025 Yes   Final    POC Temperature (Urine) 01/22/2025 92   Final   Office Visit on 12/18/2024   Component Date Value Ref Range Status    Creatinine, Urine 12/18/2024 17  15 - 325 mg/dL Final    Microalbumin 12/18/2024 1.3  <=3.0 mg/dL Final    Microalbumin/Creatinine Ratio 12/18/2024 76.5 (H)  0.0 - 30.0 mg/g Final    Triglycerides 12/18/2024 152 (H)  37 - 140 mg/dL Final    Cholesterol 12/18/2024 123  <=200 mg/dL Final    HDL Cholesterol 12/18/2024 44  35 - 60 mg/dL Final    Cholesterol/HDL Ratio (Risk Factor) 12/18/2024 2.8   Final    Non-HDL 12/18/2024 79  mg/dL Final    LDL Calculated 12/18/2024 49  mg/dL Final    LDL/HDL 12/18/2024 1.1   Final    VLDL 12/18/2024 30  mg/dL Final    Hemoglobin A1C 12/18/2024 5.5  <=7.0 % Final    Estimated Average Glucose 12/18/2024 111  mg/dL Final    Sodium 12/18/2024 143  136 - 145 mmol/L Final    Potassium 12/18/2024 3.8  3.5 - 5.1 mmol/L Final    Chloride 12/18/2024 106  98 - 107 mmol/L Final    CO2 12/18/2024 27  23 - 31 mmol/L Final    Anion Gap 12/18/2024 14  7 - 16 mmol/L Final    Glucose 12/18/2024 98  82 - 115 mg/dL Final    BUN 12/18/2024 8 (L)  10 - 20 mg/dL Final    Creatinine 12/18/2024 0.64  0.55 - 1.02 mg/dL Final    BUN/Creatinine Ratio 12/18/2024 13  6 - 20  Final    Calcium 12/18/2024 9.5  8.4 - 10.2 mg/dL Final    Total Protein 12/18/2024 8.2 (H)  5.8 - 7.6 g/dL Final    Albumin 12/18/2024 4.3  3.4 - 4.8 g/dL Final    Globulin 12/18/2024 3.9  2.0 - 4.0 g/dL Final    A/G Ratio 12/18/2024 1.1   Final    Bilirubin, Total 12/18/2024 0.4  <=1.5 mg/dL Final    Alk Phos 12/18/2024 82  40 - 150 U/L Final    ALT 12/18/2024 16  <=55 U/L Final    AST 12/18/2024 52 (H)  5 - 34 U/L Final    eGFR 12/18/2024 97  >=60 mL/min/1.73m2 Final    WBC 12/18/2024 10.62  4.50 - 11.00 K/uL Final    RBC 12/18/2024 5.58 (H)  4.20 - 5.40 M/uL Final    Hemoglobin 12/18/2024 16.7 (H)  12.0 - 16.0 g/dL Final    Hematocrit 12/18/2024 52.3 (H)  38.0 - 47.0 % Final    MCV 12/18/2024 93.7  80.0 - 96.0 fL Final    MCH 12/18/2024 29.9  27.0 - 31.0 pg Final    MCHC 12/18/2024 31.9 (L)  32.0 - 36.0 g/dL Final    RDW 12/18/2024 14.6 (H)  11.5 - 14.5 % Final    Platelet Count 12/18/2024 208  150 - 400 K/uL Final    MPV 12/18/2024 12.6 (H)  9.4 - 12.4 fL Final    Neutrophils % 12/18/2024 52.1 (L)  53.0 - 65.0 % Final    Lymphocytes % 12/18/2024 40.3  27.0 - 41.0 % Final    Monocytes % 12/18/2024 5.7  2.0 - 6.0 % Final    Eosinophils % 12/18/2024 0.3 (L)  1.0 - 4.0 % Final    Basophils % 12/18/2024 1.3 (H)  0.0 - 1.0 % Final    Immature Granulocytes % 12/18/2024 0.3  0.0 - 0.4 % Final    nRBC, Auto 12/18/2024 0.0  <=0.0 % Final    Neutrophils, Abs 12/18/2024 5.53  1.80 - 7.70 K/uL Final    Lymphocytes, Absolute 12/18/2024 4.28  1.00 - 4.80 K/uL Final    Monocytes, Absolute 12/18/2024 0.61  0.00 - 0.80 K/uL Final    Eosinophils, Absolute 12/18/2024 0.03  0.00 - 0.50 K/uL Final    Basophils, Absolute 12/18/2024 0.14  0.00 - 0.20 K/uL Final    Immature Granulocytes, Absolute 12/18/2024 0.03  0.00 - 0.04 K/uL Final    nRBC, Absolute 12/18/2024 0.00  <=0.00 x10e3/uL Final    Diff Type 12/18/2024 Auto   Final   Hospital Outpatient Visit on 11/01/2024   Component Date Value Ref Range Status    Holter Hookup Date  11/01/2024 [id number]   Final    Holter Hookup Time 11/01/2024 221771   Final    Holter Study End Date 11/01/2024 [id number]   Final    Holter Study End Time 11/01/2024 [id number]   Final    Holter Scan Date 11/01/2024 [id number]   Final    Sinus min HR 11/01/2024 61  bpm Final    Sinus max hr 11/01/2024 162  bpm Final    Sinus avg hr 11/01/2024 78  bpm Final    Event Monitor Day 11/01/2024 13   Final    Holter length hours 11/01/2024 23   Final    holter length minutes 11/01/2024 0   Final    holter length dec hours 11/01/2024 335.00   Final   Office Visit on 10/31/2024   Component Date Value Ref Range Status    QRS Duration 10/31/2024 130  ms Final    [facility] QTC Calculation 10/31/2024 543  ms Final         Orders Placed This Encounter   Procedures    POCT Urine Drug Screen Presump     Interpretive Information:     Negative:  No drug detected at the cut off level.   Positive:  This result represents presumptive positive for the   tested drug, other substances may yield a positive response other   than the analyte of interest. This result should be utilized for   diagnostic purpose only. Confirmation testing will be performed upon physician request only.          Requested Prescriptions     Signed Prescriptions Disp Refills    cyclobenzaprine (FLEXERIL) 10 MG tablet 270 tablet 0     Sig: Take 1 tablet (10 mg total) by mouth every 8 (eight) hours.    gabapentin (NEURONTIN) 600 MG tablet 90 tablet 2     Sig: Take 1 tablet (600 mg total) by mouth every 8 (eight) hours.    HYDROcodone-acetaminophen (NORCO)  mg per tablet 90 tablet 0     Sig: Take 1 tablet by mouth every 8 (eight) hours as needed for Pain.    HYDROcodone-acetaminophen (NORCO)  mg per tablet 90 tablet 0     Sig: Take 1 tablet by mouth every 8 (eight) hours as needed for Pain.    HYDROcodone-acetaminophen (NORCO)  mg per tablet 90 tablet 0     Sig: Take 1 tablet by mouth every 8 (eight) hours as needed for Pain.       Assessment:     1. Lumbosacral  radiculopathy    2. Back muscle spasm    3. Cervical radiculopathy    4. Encounter for long-term (current) use of high-risk medication                 A's of Opioid Risk Assessment  Activity:Patient can perform ADL.   Analgesia:Patients pain is partially controlled by current medication. Patient has tried OTC medications such as Tylenol and Ibuprofen with out relief.   Adverse Effects: Patient denies constipation or sedation.  Aberrant Behavior:  reviewed with no aberrant drug seeking/taking behavior.  Overdose reversal drug naloxone discussed    Drug screen reviewed      Plan:    Narcan January 2023     History lumbar surgery x2 L4 through S1 lumbar fusion Dr. Christiana Adame, heart valve disease      Bring medication  April 22, 2025        She had bilateral sacroiliac injection # 1 December 15, 2022, patient states she had 90% relief after procedure, procedure did help improve her level of function         She states current medications helping control her discomfort     She would like to continue with current treatment plan she has no new complaints     Continue home exercise program as directed    Continue current medication    Follow-up 3 months     Dr. Armas, January 2026    Bring original prescription medication bottles/container/box with labels to each visit

## 2025-04-22 ENCOUNTER — OFFICE VISIT (OUTPATIENT)
Dept: PAIN MEDICINE | Facility: CLINIC | Age: 70
End: 2025-04-22
Payer: MEDICARE

## 2025-04-22 VITALS
DIASTOLIC BLOOD PRESSURE: 80 MMHG | HEART RATE: 66 BPM | WEIGHT: 159 LBS | SYSTOLIC BLOOD PRESSURE: 163 MMHG | BODY MASS INDEX: 28.17 KG/M2 | HEIGHT: 63 IN | RESPIRATION RATE: 18 BRPM

## 2025-04-22 DIAGNOSIS — M54.12 CERVICAL RADICULOPATHY: Chronic | ICD-10-CM

## 2025-04-22 DIAGNOSIS — M62.830 BACK MUSCLE SPASM: ICD-10-CM

## 2025-04-22 DIAGNOSIS — Z79.899 ENCOUNTER FOR LONG-TERM (CURRENT) USE OF HIGH-RISK MEDICATION: ICD-10-CM

## 2025-04-22 DIAGNOSIS — M54.17 LUMBOSACRAL RADICULOPATHY: Primary | Chronic | ICD-10-CM

## 2025-04-22 PROCEDURE — 3288F FALL RISK ASSESSMENT DOCD: CPT | Mod: CPTII,,, | Performed by: PHYSICIAN ASSISTANT

## 2025-04-22 PROCEDURE — 99999 PR PBB SHADOW E&M-EST. PATIENT-LVL IV: CPT | Mod: PBBFAC,,, | Performed by: PHYSICIAN ASSISTANT

## 2025-04-22 PROCEDURE — 99214 OFFICE O/P EST MOD 30 MIN: CPT | Mod: PBBFAC | Performed by: PHYSICIAN ASSISTANT

## 2025-04-22 PROCEDURE — 80305 DRUG TEST PRSMV DIR OPT OBS: CPT | Mod: PBBFAC | Performed by: PHYSICIAN ASSISTANT

## 2025-04-22 PROCEDURE — 1125F AMNT PAIN NOTED PAIN PRSNT: CPT | Mod: CPTII,,, | Performed by: PHYSICIAN ASSISTANT

## 2025-04-22 PROCEDURE — 99214 OFFICE O/P EST MOD 30 MIN: CPT | Mod: S$PBB,,, | Performed by: PHYSICIAN ASSISTANT

## 2025-04-22 PROCEDURE — 3077F SYST BP >= 140 MM HG: CPT | Mod: CPTII,,, | Performed by: PHYSICIAN ASSISTANT

## 2025-04-22 PROCEDURE — 1159F MED LIST DOCD IN RCRD: CPT | Mod: CPTII,,, | Performed by: PHYSICIAN ASSISTANT

## 2025-04-22 PROCEDURE — 4010F ACE/ARB THERAPY RXD/TAKEN: CPT | Mod: CPTII,,, | Performed by: PHYSICIAN ASSISTANT

## 2025-04-22 PROCEDURE — 99999PBSHW POCT URINE DRUG SCREEN PRESUMP: Mod: PBBFAC,,,

## 2025-04-22 PROCEDURE — 3079F DIAST BP 80-89 MM HG: CPT | Mod: CPTII,,, | Performed by: PHYSICIAN ASSISTANT

## 2025-04-22 PROCEDURE — 3008F BODY MASS INDEX DOCD: CPT | Mod: CPTII,,, | Performed by: PHYSICIAN ASSISTANT

## 2025-04-22 PROCEDURE — 1101F PT FALLS ASSESS-DOCD LE1/YR: CPT | Mod: CPTII,,, | Performed by: PHYSICIAN ASSISTANT

## 2025-04-22 RX ORDER — HYDROCODONE BITARTRATE AND ACETAMINOPHEN 10; 325 MG/1; MG/1
1 TABLET ORAL EVERY 8 HOURS PRN
Qty: 90 TABLET | Refills: 0 | Status: SHIPPED | OUTPATIENT
Start: 2025-05-28

## 2025-04-22 RX ORDER — GABAPENTIN 600 MG/1
600 TABLET ORAL EVERY 8 HOURS
Qty: 90 TABLET | Refills: 2 | Status: SHIPPED | OUTPATIENT
Start: 2025-04-22

## 2025-04-22 RX ORDER — HYDROCODONE BITARTRATE AND ACETAMINOPHEN 10; 325 MG/1; MG/1
1 TABLET ORAL EVERY 8 HOURS PRN
Qty: 90 TABLET | Refills: 0 | Status: SHIPPED | OUTPATIENT
Start: 2025-04-28

## 2025-04-22 RX ORDER — HYDROCODONE BITARTRATE AND ACETAMINOPHEN 10; 325 MG/1; MG/1
1 TABLET ORAL EVERY 8 HOURS PRN
Qty: 90 TABLET | Refills: 0 | Status: SHIPPED | OUTPATIENT
Start: 2025-06-27

## 2025-04-22 RX ORDER — CYCLOBENZAPRINE HCL 10 MG
10 TABLET ORAL EVERY 8 HOURS
Qty: 270 TABLET | Refills: 0 | Status: SHIPPED | OUTPATIENT
Start: 2025-04-22 | End: 2025-07-21

## 2025-06-12 ENCOUNTER — PATIENT MESSAGE (OUTPATIENT)
Dept: FAMILY MEDICINE | Facility: CLINIC | Age: 70
End: 2025-06-12
Payer: MEDICARE

## 2025-07-16 NOTE — PROGRESS NOTES
Subjective:         Patient ID: Ade Grant is a 70 y.o. female.    Chief Complaint: Foot Pain (bilateral) and Low-back Pain      Pain  This is a chronic problem. The current episode started more than 1 year ago. The problem occurs daily. The problem has been unchanged. Associated symptoms include arthralgias, myalgias and neck pain. Pertinent negatives include no anorexia, chest pain, chills, diaphoresis, fever, sore throat, urinary symptoms, vertigo, visual change or vomiting.     Review of Systems   Constitutional:  Negative for activity change, appetite change, chills, diaphoresis, fever and unexpected weight change.   HENT:  Negative for drooling, ear discharge, ear pain, facial swelling, mouth sores, nosebleeds, sore throat, trouble swallowing, voice change and goiter.    Eyes:  Negative for photophobia, pain, discharge, redness and visual disturbance.   Respiratory:  Negative for apnea, choking, chest tightness, shortness of breath, wheezing and stridor.    Cardiovascular:  Negative for chest pain, palpitations and leg swelling.   Gastrointestinal:  Negative for abdominal distention, anorexia, diarrhea, rectal pain, vomiting and fecal incontinence.   Endocrine: Negative for cold intolerance, heat intolerance, polydipsia, polyphagia and polyuria.   Genitourinary:  Negative for bladder incontinence, dysuria, flank pain, frequency and hot flashes.   Musculoskeletal:  Positive for arthralgias, back pain, leg pain, myalgias and neck pain.   Integumentary:  Negative for color change and pallor.   Allergic/Immunologic: Negative for immunocompromised state.   Neurological:  Negative for dizziness, vertigo, seizures, syncope, facial asymmetry, speech difficulty, light-headedness, coordination difficulties and memory loss.   Hematological:  Negative for adenopathy. Does not bruise/bleed easily.   Psychiatric/Behavioral:  Negative for agitation, behavioral problems, confusion, decreased concentration, dysphoric  mood, hallucinations, self-injury and suicidal ideas. The patient is not nervous/anxious and is not hyperactive.            Past Medical History:   Diagnosis Date    Anxiety     Cervical radiculopathy     Chest pain 07/15/2016    Chronic pain syndrome     COPD (chronic obstructive pulmonary disease)     Depression     Dyspnea 07/15/2016    Enlarged heart     GERD (gastroesophageal reflux disease)     Hyperlipidemia     Hypertension     Lumbosacral radiculopathy     Obstructive sleep apnea     Other long term (current) drug therapy     Presence of right artificial knee joint     Vitamin D deficiency      Past Surgical History:   Procedure Laterality Date    ARTHROSCOPY OF HIP Left 1992    BACK SURGERY      CAUDAL EPIDURAL STEROID INJECTION N/A 06/04/2018 2/5/2018    Dr Hawkins    ECHOCARDIOGRAM,TRANSESOPHAGEAL N/A 08/23/2023    Procedure: Transesophageal echo (SUSAN) intra-procedure log documentation;  Surgeon: Kate Ramirez MD;  Location: [facility] CATH LAB;  Service: Cardiology;  Laterality: N/A;    HYSTERECTOMY      INJECTION OF ANESTHETIC AGENT INTO SACROILIAC JOINT Bilateral 12/15/2022    Procedure: BLOCK, SACROILIAC JOINT   BILATERAL;  Surgeon: Riya Armas MD;  Location: [facility] PAIN MGMT;  Service: Pain Management;  Laterality: Bilateral;    KNEE ARTHROSCOPY Right     x2    LUMBAR FUSION      TUBAL LIGATION       Social History     Socioeconomic History    Marital status:     Number of children: 3   Tobacco Use    Smoking status: Every Day     Current packs/day: 1.00     Average packs/day: 1 pack/day for 56.6 years (56.6 ttl pk-yrs)     Types: Cigarettes     Start date: 1969    Smokeless tobacco: Never   Substance and Sexual Activity    Alcohol use: Not Currently    Drug use: Yes     Types: Hydrocodone    Sexual activity: Not Currently     Social Drivers of Health     Financial Resource Strain: Low Risk  (12/18/2024)    Overall Financial Resource Strain (CARDIA)     Difficulty of Paying Living Expenses:  "Not very hard   Food Insecurity: No Food Insecurity (12/18/2024)    Hunger Vital Sign     Worried About Running Out of Food in the Last Year: Never true     Ran Out of Food in the Last Year: Never true   Transportation Needs: No Transportation Needs (12/18/2024)    PRAPARE - Transportation     Lack of Transportation (Medical): No     Lack of Transportation (Non-Medical): No   Physical Activity: Inactive (12/18/2024)    Exercise Vital Sign     Days of Exercise per Week: 0 days     Minutes of Exercise per Session: 0 min   Stress: Stress Concern Present (12/18/2024)    [employer] - Occupational Stress Questionnaire     Feeling of Stress : Rather much   Housing Stability: Unknown (12/18/2024)    Housing Stability Vital Sign     Unable to Pay for Housing in the Last Year: No     Homeless in the Last Year: No     Family History   Problem Relation Name Age of Onset    Cancer Mother      Heart disease Mother      Hypertension Mother      Heart disease Father      Thyroid cancer Brother      Bone cancer Brother      Colon cancer Brother      Thyroid cancer Other aunt     Cancer Other uncle      Review of patient's allergies indicates:   Allergen Reactions    Fenofibrate nanocrystallized Rash        Objective:  Vitals:    07/22/25 1311   BP: (!) 205/101   Pulse: 80   Resp: 18   Weight: 71.2 kg (157 lb)   Height: 5' 3" (1.6 m)   PainSc:   6   PainLoc: Back                   Physical Exam  Vitals and nursing note reviewed. Exam conducted with a chaperone present.   Constitutional:       General: She is awake. She is not in acute distress.     Appearance: Normal appearance. She is not ill-appearing or toxic-appearing.   HENT:      Head: Normocephalic and atraumatic.      Nose: Nose normal.      Mouth/Throat:      Mouth: Mucous membranes are moist.      Pharynx: Oropharynx is clear.   Eyes:      Conjunctiva/sclera: Conjunctivae normal.      Pupils: Pupils are equal, round, and reactive to light. "   Cardiovascular:      Rate and Rhythm: Normal rate.   Pulmonary:      Effort: Pulmonary effort is normal. No respiratory distress.   Abdominal:      Palpations: Abdomen is soft.      Tenderness: There is no guarding.   Musculoskeletal:         General: Normal range of motion.      Cervical back: Normal range of motion and neck supple. Tenderness present. No rigidity.      Thoracic back: Tenderness present.      Lumbar back: Tenderness present.   Skin:     General: Skin is warm and dry.      Coloration: Skin is not jaundiced or pale.   Neurological:      General: No focal deficit present.      Mental Status: She is alert and oriented to person, place, and time. Mental status is at baseline.      Cranial Nerves: No cranial nerve deficit (II-XII).   Psychiatric:         Mood and Affect: Mood normal.         Behavior: Behavior normal. Behavior is cooperative.         Thought Content: Thought content normal.           Cardiac Monitor - 3-15 Day Adult (Cupid Only)    Predominant normal sinus rhythm. No significant cardiac arrythmias to   explain syncope.    Patient had a min HR of 61 bpm, max HR of 162 bpm, and avg HR of 78 bpm.   Predominant underlying rhythm was Sinus Rhythm. First Degree AV Block was   present. Bundle Branch Block/IVCD was present. 3 Supraventricular   Tachycardia runs occurred, the run with the fastest interval lasting 6   beats with a max rate of 162 bpm, the longest lasting 17 beats with an avg   rate of 134 bpm. Isolated SVEs were rare (<1.0%, 1093), and no SVE   Couplets or SVE Triplets were present. Isolated VEs were rare (<1.0%,   250), VE Couplets were rare (<1.0%, 44), and no VE Triplets were present.       Office Visit on 04/22/2025   Component Date Value Ref Range Status    POC Amphetamines 04/22/2025 Negative  Negative, Inconclusive Final    POC Barbiturates 04/22/2025 Negative  Negative, Inconclusive Final    POC Benzodiazepines 04/22/2025 Negative  Negative, Inconclusive Final    POC  Cocaine 04/22/2025 Negative  Negative, Inconclusive Final    POC THC 04/22/2025 Negative  Negative, Inconclusive Final    POC Methadone 04/22/2025 Negative  Negative, Inconclusive Final    POC Methamphetamine 04/22/2025 Negative  Negative, Inconclusive Final    POC Opiates 04/22/2025 Presumptive Positive (A)  Negative, Inconclusive Final    POC Oxycodone 04/22/2025 Negative  Negative, Inconclusive Final    POC Phencyclidine 04/22/2025 Negative  Negative, Inconclusive Final    POC Methylenedioxymethamphetamine * 04/22/2025 Negative  Negative, Inconclusive Final    POC Tricyclic Antidepressants 04/22/2025 Negative  Negative, Inconclusive Final    POC Buprenorphine 04/22/2025 Negative   Final     Acceptable 04/22/2025 Yes   Final    POC Temperature (Urine) 04/22/2025 92   Final         Orders Placed This Encounter   Procedures    POCT Urine Drug Screen Presump     Interpretive Information:     Negative:  No drug detected at the cut off level.   Positive:  This result represents presumptive positive for the   tested drug, other substances may yield a positive response other   than the analyte of interest. This result should be utilized for   diagnostic purpose only. Confirmation testing will be performed upon physician request only.       EMG W/ ULTRASOUND AND NERVE CONDUCTION TEST 2 Extremities     Standing Status:   Future     Number of Occurrences:   1     Expected Date:   7/29/2025     Expiration Date:   7/22/2026     Scheduling Instructions:      Dr. Ramirez, [place] [facility] [phone],            Bilateral lower extremities burning numbness both feet     Number of Extremities:   2 Extremities     Reason for Procedure::   Lumbosacral Radiculopathy       Requested Prescriptions     Signed Prescriptions Disp Refills    cyclobenzaprine (FLEXERIL) 10 MG tablet 270 tablet 0     Sig: Take 1 tablet (10 mg total) by mouth every 8 (eight) hours.    gabapentin (NEURONTIN) 600 MG tablet  270 tablet 0     Sig: Take 1 tablet (600 mg total) by mouth every 8 (eight) hours.    HYDROcodone-acetaminophen (NORCO)  mg per tablet 90 tablet 0     Sig: Take 1 tablet by mouth every 8 (eight) hours as needed for Pain.    HYDROcodone-acetaminophen (NORCO)  mg per tablet 90 tablet 0     Sig: Take 1 tablet by mouth every 8 (eight) hours as needed for Pain.    HYDROcodone-acetaminophen (NORCO)  mg per tablet 90 tablet 0     Sig: Take 1 tablet by mouth every 8 (eight) hours as needed for Pain.       Assessment:     1. Lumbosacral radiculopathy    2. Back muscle spasm    3. Cervical radiculopathy    4. Encounter for long-term (current) use of high-risk medication                   A's of Opioid Risk Assessment  Activity:Patient can perform ADL.   Analgesia:Patients pain is partially controlled by current medication. Patient has tried OTC medications such as Tylenol and Ibuprofen with out relief.   Adverse Effects: Patient denies constipation or sedation.  Aberrant Behavior:  reviewed with no aberrant drug seeking/taking behavior.  Overdose reversal drug naloxone discussed    Drug screen reviewed      Plan:    Narcan January 2023     History lumbar surgery x2 L4 through S1 lumbar fusion Dr. Christiana Adame, heart valve disease      Bring medication  April 22, 2025        She had bilateral sacroiliac injection # 1 December 15, 2022, patient states she had 90% relief after procedure, procedure did help improve her level of function       Complaint mostly bilateral foot pain numbness and burning neuropathy type pain     She states she had an EMG nerve conduction study many years ago does not have the results     She states cardiology did a arterial ultrasound was told it was okay     She complaining bilateral feet turning purple     She will discuss with her cardiologist     Would like to have EMG nerve conduction study bilateral lower extremities     Considering sympathetic nerve block  bilateral lower extremity    Continue home exercise program as directed    Continue current medication     Follow-up 3 months     Dr. Armas, January 2026    Bring original prescription medication bottles/container/box with labels to each visit

## 2025-07-22 ENCOUNTER — OFFICE VISIT (OUTPATIENT)
Dept: PAIN MEDICINE | Facility: CLINIC | Age: 70
End: 2025-07-22
Payer: MEDICARE

## 2025-07-22 VITALS
HEART RATE: 80 BPM | BODY MASS INDEX: 27.82 KG/M2 | HEIGHT: 63 IN | DIASTOLIC BLOOD PRESSURE: 101 MMHG | SYSTOLIC BLOOD PRESSURE: 205 MMHG | WEIGHT: 157 LBS | RESPIRATION RATE: 18 BRPM

## 2025-07-22 DIAGNOSIS — M54.17 LUMBOSACRAL RADICULOPATHY: Primary | Chronic | ICD-10-CM

## 2025-07-22 DIAGNOSIS — M62.830 BACK MUSCLE SPASM: ICD-10-CM

## 2025-07-22 DIAGNOSIS — M54.12 CERVICAL RADICULOPATHY: Chronic | ICD-10-CM

## 2025-07-22 DIAGNOSIS — Z79.899 ENCOUNTER FOR LONG-TERM (CURRENT) USE OF HIGH-RISK MEDICATION: ICD-10-CM

## 2025-07-22 LAB

## 2025-07-22 PROCEDURE — 80305 DRUG TEST PRSMV DIR OPT OBS: CPT | Mod: PBBFAC | Performed by: PHYSICIAN ASSISTANT

## 2025-07-22 PROCEDURE — 1101F PT FALLS ASSESS-DOCD LE1/YR: CPT | Mod: CPTII,,, | Performed by: PHYSICIAN ASSISTANT

## 2025-07-22 PROCEDURE — 1159F MED LIST DOCD IN RCRD: CPT | Mod: CPTII,,, | Performed by: PHYSICIAN ASSISTANT

## 2025-07-22 PROCEDURE — 3288F FALL RISK ASSESSMENT DOCD: CPT | Mod: CPTII,,, | Performed by: PHYSICIAN ASSISTANT

## 2025-07-22 PROCEDURE — 3080F DIAST BP >= 90 MM HG: CPT | Mod: CPTII,,, | Performed by: PHYSICIAN ASSISTANT

## 2025-07-22 PROCEDURE — 4010F ACE/ARB THERAPY RXD/TAKEN: CPT | Mod: CPTII,,, | Performed by: PHYSICIAN ASSISTANT

## 2025-07-22 PROCEDURE — 3008F BODY MASS INDEX DOCD: CPT | Mod: CPTII,,, | Performed by: PHYSICIAN ASSISTANT

## 2025-07-22 PROCEDURE — 1125F AMNT PAIN NOTED PAIN PRSNT: CPT | Mod: CPTII,,, | Performed by: PHYSICIAN ASSISTANT

## 2025-07-22 PROCEDURE — 99214 OFFICE O/P EST MOD 30 MIN: CPT | Mod: S$PBB,,, | Performed by: PHYSICIAN ASSISTANT

## 2025-07-22 PROCEDURE — 99214 OFFICE O/P EST MOD 30 MIN: CPT | Mod: PBBFAC | Performed by: PHYSICIAN ASSISTANT

## 2025-07-22 PROCEDURE — 99999PBSHW POCT URINE DRUG SCREEN PRESUMP: Mod: PBBFAC,,,

## 2025-07-22 PROCEDURE — 3077F SYST BP >= 140 MM HG: CPT | Mod: CPTII,,, | Performed by: PHYSICIAN ASSISTANT

## 2025-07-22 PROCEDURE — 99999 PR PBB SHADOW E&M-EST. PATIENT-LVL IV: CPT | Mod: PBBFAC,,, | Performed by: PHYSICIAN ASSISTANT

## 2025-07-22 RX ORDER — GABAPENTIN 600 MG/1
600 TABLET ORAL EVERY 8 HOURS
Qty: 270 TABLET | Refills: 0 | Status: SHIPPED | OUTPATIENT
Start: 2025-07-22

## 2025-07-22 RX ORDER — HYDROCODONE BITARTRATE AND ACETAMINOPHEN 10; 325 MG/1; MG/1
1 TABLET ORAL EVERY 8 HOURS PRN
Qty: 90 TABLET | Refills: 0 | Status: SHIPPED | OUTPATIENT
Start: 2025-09-25

## 2025-07-22 RX ORDER — HYDROCODONE BITARTRATE AND ACETAMINOPHEN 10; 325 MG/1; MG/1
1 TABLET ORAL EVERY 8 HOURS PRN
Qty: 90 TABLET | Refills: 0 | Status: SHIPPED | OUTPATIENT
Start: 2025-08-26

## 2025-07-22 RX ORDER — CYCLOBENZAPRINE HCL 10 MG
10 TABLET ORAL EVERY 8 HOURS
Qty: 270 TABLET | Refills: 0 | Status: SHIPPED | OUTPATIENT
Start: 2025-07-22 | End: 2025-10-20

## 2025-07-22 RX ORDER — HYDROCODONE BITARTRATE AND ACETAMINOPHEN 10; 325 MG/1; MG/1
1 TABLET ORAL EVERY 8 HOURS PRN
Qty: 90 TABLET | Refills: 0 | Status: SHIPPED | OUTPATIENT
Start: 2025-07-27

## 2025-07-24 ENCOUNTER — OFFICE VISIT (OUTPATIENT)
Dept: FAMILY MEDICINE | Facility: CLINIC | Age: 70
End: 2025-07-24
Payer: MEDICARE

## 2025-07-24 VITALS
TEMPERATURE: 99 F | BODY MASS INDEX: 28 KG/M2 | HEIGHT: 63 IN | RESPIRATION RATE: 18 BRPM | OXYGEN SATURATION: 94 % | SYSTOLIC BLOOD PRESSURE: 108 MMHG | HEART RATE: 84 BPM | WEIGHT: 158 LBS | DIASTOLIC BLOOD PRESSURE: 78 MMHG

## 2025-07-24 DIAGNOSIS — I10 PRIMARY HYPERTENSION: Primary | Chronic | ICD-10-CM

## 2025-07-24 DIAGNOSIS — Z12.31 ENCOUNTER FOR SCREENING MAMMOGRAM FOR MALIGNANT NEOPLASM OF BREAST: ICD-10-CM

## 2025-07-24 DIAGNOSIS — F41.9 ANXIETY AND DEPRESSION: ICD-10-CM

## 2025-07-24 DIAGNOSIS — F32.A ANXIETY AND DEPRESSION: ICD-10-CM

## 2025-07-24 DIAGNOSIS — I10 PRIMARY HYPERTENSION: ICD-10-CM

## 2025-07-24 DIAGNOSIS — E87.6 HYPOKALEMIA: ICD-10-CM

## 2025-07-24 DIAGNOSIS — E78.5 DYSLIPIDEMIA: ICD-10-CM

## 2025-07-24 DIAGNOSIS — Z12.39 ENCOUNTER FOR SCREENING FOR MALIGNANT NEOPLASM OF BREAST, UNSPECIFIED SCREENING MODALITY: ICD-10-CM

## 2025-07-24 PROCEDURE — 4010F ACE/ARB THERAPY RXD/TAKEN: CPT | Mod: ,,,

## 2025-07-24 PROCEDURE — 3008F BODY MASS INDEX DOCD: CPT | Mod: ,,,

## 2025-07-24 PROCEDURE — 1159F MED LIST DOCD IN RCRD: CPT | Mod: ,,,

## 2025-07-24 PROCEDURE — 3074F SYST BP LT 130 MM HG: CPT | Mod: ,,,

## 2025-07-24 PROCEDURE — 3078F DIAST BP <80 MM HG: CPT | Mod: ,,,

## 2025-07-24 PROCEDURE — 1160F RVW MEDS BY RX/DR IN RCRD: CPT | Mod: ,,,

## 2025-07-24 PROCEDURE — 1125F AMNT PAIN NOTED PAIN PRSNT: CPT | Mod: ,,,

## 2025-07-24 PROCEDURE — 1101F PT FALLS ASSESS-DOCD LE1/YR: CPT | Mod: ,,,

## 2025-07-24 PROCEDURE — 3288F FALL RISK ASSESSMENT DOCD: CPT | Mod: ,,,

## 2025-07-24 PROCEDURE — 99214 OFFICE O/P EST MOD 30 MIN: CPT | Mod: ,,,

## 2025-07-24 RX ORDER — LISINOPRIL AND HYDROCHLOROTHIAZIDE 20; 25 MG/1; MG/1
1 TABLET ORAL DAILY
Qty: 90 TABLET | Refills: 1 | Status: CANCELLED | OUTPATIENT
Start: 2025-07-24

## 2025-07-24 RX ORDER — HYDROCHLOROTHIAZIDE 12.5 MG/1
12.5 TABLET ORAL DAILY
Qty: 90 TABLET | Refills: 1 | Status: CANCELLED | OUTPATIENT
Start: 2025-07-24 | End: 2026-07-24

## 2025-07-24 RX ORDER — METOPROLOL SUCCINATE 100 MG/1
200 TABLET, EXTENDED RELEASE ORAL DAILY
Qty: 180 TABLET | Refills: 1 | Status: SHIPPED | OUTPATIENT
Start: 2025-07-24 | End: 2026-07-24

## 2025-07-24 RX ORDER — OFLOXACIN 3 MG/ML
SOLUTION/ DROPS OPHTHALMIC
COMMUNITY
Start: 2025-06-27

## 2025-07-24 RX ORDER — SERTRALINE HYDROCHLORIDE 50 MG/1
50 TABLET, FILM COATED ORAL DAILY
Qty: 90 TABLET | Refills: 1 | Status: SHIPPED | OUTPATIENT
Start: 2025-07-24 | End: 2026-07-24

## 2025-07-24 RX ORDER — CLOPIDOGREL BISULFATE 75 MG/1
75 TABLET ORAL DAILY
Qty: 90 TABLET | Refills: 1 | Status: SHIPPED | OUTPATIENT
Start: 2025-07-24

## 2025-07-24 RX ORDER — LISINOPRIL AND HYDROCHLOROTHIAZIDE 20; 25 MG/1; MG/1
1 TABLET ORAL DAILY
Qty: 90 TABLET | Refills: 1 | Status: SHIPPED | OUTPATIENT
Start: 2025-07-24

## 2025-07-24 RX ORDER — POTASSIUM CHLORIDE 20 MEQ/1
20 TABLET, EXTENDED RELEASE ORAL DAILY
Qty: 90 TABLET | Refills: 3 | Status: SHIPPED | OUTPATIENT
Start: 2025-07-24 | End: 2026-07-24

## 2025-07-24 RX ORDER — LISINOPRIL 20 MG/1
20 TABLET ORAL DAILY
Qty: 90 TABLET | Refills: 1 | Status: CANCELLED | OUTPATIENT
Start: 2025-07-24

## 2025-07-24 RX ORDER — DIFLUPREDNATE OPHTHALMIC 0.5 MG/ML
EMULSION OPHTHALMIC
COMMUNITY
Start: 2025-06-27

## 2025-07-24 RX ORDER — ROSUVASTATIN CALCIUM 40 MG/1
40 TABLET, COATED ORAL DAILY
Qty: 90 TABLET | Refills: 1 | Status: SHIPPED | OUTPATIENT
Start: 2025-07-24 | End: 2026-07-24

## 2025-07-24 NOTE — PROGRESS NOTES
CHIKI Mike   [facility]  [address]   [place] [address]     PATIENT NAME: Ade Grant  : 1955  DATE: 25  MRN: [id number]      Billing Provider: CHIKI Mike  Level of Service:   Patient PCP Information       Provider PCP Type    DMITRY Medina            Reason for Visit / Chief Complaint: Medication Refill (Reports that her BP was high yesterday at her pain management appt.)         History of Present Illness / Problem Focused Workflow     Ade Grant is a 70 y.o. female presents to the clinic for medication refills and to discuss high blood pressure recorded yesterday.    HPI:  Patient reports high blood pressure, with a reading of 205/101 recorded yesterday. Reports blood pressure was recorded at pain management. Blood pressure today is 108/78. She has pain and persistent discoloration in her feet and legs, suggesting possible circulation issues. She reports a sensation of impaired blood flow in her feet. She is scheduled to see a specialist about her feet, with a potential nerve conduction study being planned.    She takes zoloft for management of depression.    She has foot pain and low back pain, for which she has been evaluated by pain management Dr. Hernandez. She is scheduled to return to Dr. Hernandez in 3 months.    She sees a cardiologist, Dr. Adame, for cardiomegaly and hypertension.    She had polyps during a colonoscopy about 5 or 6 years ago, and has been advised to have colonoscopies every 3 years since then. She is due for a colonscopy, but does not wish to schedule one at this time.    Patient is due for a mammogram. She has requested to schedule this at [place].      ROS:  General: -fever, -chills, -fatigue, -weight gain, -weight loss  Eyes: -vision changes, -redness, -discharge  ENT: -ear pain, -nasal congestion, -sore throat  Cardiovascular: -chest pain, -palpitations, -lower extremity edema  Respiratory: -cough, -shortness of  breath, +wheezing  Gastrointestinal: -abdominal pain, -nausea, -vomiting, -diarrhea, -constipation, -blood in stool  Genitourinary: -dysuria, -hematuria, -frequency  Musculoskeletal: -joint pain, -muscle pain, +limb pain, +back pain  Skin: -rash, -lesion  Neurological: -headache, -dizziness, -numbness, -tingling  Psychiatric: -anxiety, -depression, -sleep difficulty            Medical / Social / Family History     Past Medical History:   Diagnosis Date    Anxiety     Cervical radiculopathy     Chest pain 07/15/2016    Chronic pain syndrome     COPD (chronic obstructive pulmonary disease)     Depression     Dyspnea 07/15/2016    Enlarged heart     GERD (gastroesophageal reflux disease)     Hyperlipidemia     Hypertension     Lumbosacral radiculopathy     Obstructive sleep apnea     Other long term (current) drug therapy     Presence of right artificial knee joint     Vitamin D deficiency        Past Surgical History:   Procedure Laterality Date    ARTHROSCOPY OF HIP Left 1992    BACK SURGERY      CAUDAL EPIDURAL STEROID INJECTION N/A 06/04/2018 2/5/2018    Dr Hawkins    ECHOCARDIOGRAM,TRANSESOPHAGEAL N/A 08/23/2023    Procedure: Transesophageal echo (SUSAN) intra-procedure log documentation;  Surgeon: Kate Ramirez MD;  Location: [facility] CATH LAB;  Service: Cardiology;  Laterality: N/A;    HYSTERECTOMY      INJECTION OF ANESTHETIC AGENT INTO SACROILIAC JOINT Bilateral 12/15/2022    Procedure: BLOCK, SACROILIAC JOINT   BILATERAL;  Surgeon: Riya Armas MD;  Location: [facility] PAIN MGMT;  Service: Pain Management;  Laterality: Bilateral;    KNEE ARTHROSCOPY Right     x2    LUMBAR FUSION      TUBAL LIGATION         Social History  Ms.  reports that she has been smoking cigarettes. She started smoking about 56 years ago. She has a 56.6 pack-year smoking history. She has never used smokeless tobacco. She reports that she does not currently use alcohol. She reports current drug use. Drug: Hydrocodone.    Family  History  Ms.'s family history includes Bone cancer in her brother; Cancer in her mother and another family member; Colon cancer in her brother; Heart disease in her father and mother; Hypertension in her mother; Thyroid cancer in her brother and another family member.    Medications and Allergies     Medications  Outpatient Medications Marked as Taking for the 7/24/25 encounter (Office Visit) with Gloria Trimble FNP-C   Medication Sig Dispense Refill    blood pressure test kit-large Kit 1 kit by Misc.(Non-Drug; Combo Route) route once daily. 1 each 0    CAPVAXIVE 0.5 mL Syrg Inject into the muscle.      cyclobenzaprine (FLEXERIL) 10 MG tablet Take 1 tablet (10 mg total) by mouth every 8 (eight) hours. 270 tablet 0    difluprednate (DUREZOL) 0.05 % Drop ophthalmic solution INSTILL 1 DROP INTO RIGHT EYE TWICE DAILY      gabapentin (NEURONTIN) 600 MG tablet Take 1 tablet (600 mg total) by mouth every 8 (eight) hours. 270 tablet 0    [START ON 9/25/2025] HYDROcodone-acetaminophen (NORCO)  mg per tablet Take 1 tablet by mouth every 8 (eight) hours as needed for Pain. 90 tablet 0    [START ON 8/26/2025] HYDROcodone-acetaminophen (NORCO)  mg per tablet Take 1 tablet by mouth every 8 (eight) hours as needed for Pain. 90 tablet 0    HYDROcodone-acetaminophen (NORCO)  mg per tablet Take 1 tablet by mouth every 8 (eight) hours as needed for Pain. 90 tablet 0    ofloxacin (OCUFLOX) 0.3 % ophthalmic solution INSTILL 1 DROP INTO RIGHT EYE TWICE DAILY      [DISCONTINUED] clopidogreL (PLAVIX) 75 mg tablet Take 1 tablet (75 mg total) by mouth once daily. 90 tablet 1    [DISCONTINUED] lisinopriL (PRINIVIL,ZESTRIL) 20 MG tablet Take 1 tablet (20 mg total) by mouth once daily. 90 tablet 1    [DISCONTINUED] lisinopriL-hydrochlorothiazide (PRINZIDE,ZESTORETIC) 20-25 mg Tab Take 1 tablet by mouth once daily.      [DISCONTINUED] metoprolol succinate (TOPROL-XL) 100 MG 24 hr tablet Take 2 tablets (200 mg total) by  "mouth once daily. 180 tablet 1    [DISCONTINUED] potassium chloride SA (K-DUR,KLOR-CON) 20 MEQ tablet Take 1 tablet (20 mEq total) by mouth once daily. 90 tablet 3    [DISCONTINUED] rosuvastatin (CRESTOR) 40 MG Tab Take 1 tablet (40 mg total) by mouth once daily. 90 tablet 1    [DISCONTINUED] sertraline (ZOLOFT) 50 MG tablet Take 1 tablet (50 mg total) by mouth once daily. 90 tablet 1       Allergies  Review of patient's allergies indicates:   Allergen Reactions    Fenofibrate nanocrystallized Rash       Physical Examination     Vitals:    07/24/25 1454 07/24/25 1514   BP: (!) 201/93 108/78   BP Location: Left arm Left arm   Patient Position: Sitting Sitting   Pulse: 84    Resp: 18    Temp: 98.9 °F (37.2 °C)    TempSrc: Oral    SpO2: (!) 94%    Weight: 71.7 kg (158 lb)    Height: 5' 3" (1.6 m)       Physical Exam     Assessment and Plan (including Health Maintenance)     Assessment & Plan    IMPRESSION:  - BP improved from 205/101 yesterday to 108/78 today.  - Complaints of foot pain, leg pain, and low back pain.  - Reviewed Dr. Hernandez notes regarding low back pain.  - Possibility of vascular issues in feet due to reported pain and discoloration.  - Considered cancer screening options, including lung CT, mammogram, and colonoscopy.  - Colonoscopy necessary due to history of polyps, rather than using FIT kit.    HYPERTENSIVE URGENCY:  - Monitored the patient's blood pressure, which was elevated yesterday at 205/101 but improved today.  - Continued Lisinopril with hydrochlorothiazide as a combination pill to simplify medication regimen.  - Continued metoprolol for blood pressure management.    PERIPHERAL VASCULAR DISEASE:  - Patient reports pain in feet, persistent purple discoloration, and sensation of inadequate blood circulation through the veins.    HYPERLIPIDEMIA:  - Continued Crestor for hyperlipidemia management.    FOOT PAIN:  - Patient reports foot pain and is currently under Dr. Hernandez care for " management.    LOW BACK PAIN:  - Patient reports low back pain and is currently under Dr. Stanley's care for management.    HISTORY OF NICOTINE DEPENDENCE:  - Emphasized the importance of yearly low-dose lung CT for patients with history of smoking to screen for lung cancer.  - Patient is due for this screening. She does not wish to schedule.    HISTORY OF COLON POLYPS:  - Advised the patient to have a colonoscopy instead of a FIT kit due to history of polyps.  - Instructed the patient to contact the office when ready for colonoscopy scheduling.    LONG TERM USE OF MEDICATIONS:  - Continued aspirin, Zoloft, and potassium as part of medication regimen.             Health Maintenance Due   Topic Date Due    TETANUS VACCINE  Never done    Naloxone Prescription  Never done    Colorectal Cancer Screening  Never done    LDCT Lung Screen  Never done    Shingles Vaccine (1 of 2) Never done    RSV Vaccine (Age 60+ and Pregnant patients) (1 - Risk 60-74 years 1-dose series) Never done    Mammogram  04/18/2024    COVID-19 Vaccine (1 - 2024-25 season) Never done    DEXA Scan  08/28/2025       Problem List Items Addressed This Visit       Hypertensive disorder    Overview   has not taken meds today  continue to smoke          Dyslipidemia     Other Visit Diagnoses         Encounter for screening for malignant neoplasm of breast, unspecified screening modality    -  Primary      Hypokalemia          Anxiety and depression          Encounter for screening mammogram for malignant neoplasm of breast            .  Health Maintenance Topics with due status: Not Due       Topic Last Completion Date    Influenza Vaccine 11/15/2024    Hemoglobin A1c (Diabetic Prevention Screening) 12/18/2024    Lipid Panel 12/18/2024    Urine Drug Screen 07/22/2025       Procedures     Future Appointments   Date Time Provider Department Center   10/22/2025  1:15 PM Mary, [facility]   12/24/2025  9:00 AM ALBERTO [facility]  [facility]IC[facility] [place]        No follow-ups on file.     Signature:  DMITRY Mike-ROLAND    Date of encounter: 7/24/25    This note was generated with the assistance of ambient listening technology. Verbal consent was obtained by the patient and accompanying visitor(s) for the recording of patient appointment to facilitate this note. I attest to having reviewed and edited the generated note for accuracy, though some syntax or spelling errors may persist. Please contact the author of this note for any clarification.

## (undated) DEVICE — APPLICATOR CHLORAPREP ORN 26ML

## (undated) DEVICE — TUBE SUCTION MEDI-VAC STERILE

## (undated) DEVICE — LINER SEMI-RIGID 1500CC

## (undated) DEVICE — CANNULA NASAL NONFLARE TIP 7FT

## (undated) DEVICE — CUFF FLEXIPORT BP LONG ADULT

## (undated) DEVICE — TRAY NERVE BLOCK UNIV 10/CA

## (undated) DEVICE — KIT IV START WITH PREVANTICS

## (undated) DEVICE — CLOTH READYPREP 2%CHG 9X10.5IN

## (undated) DEVICE — OXISENSOR ADULT DIGIT N/S

## (undated) DEVICE — SOL CONTINU-FLO SET 2 LAV

## (undated) DEVICE — CATH IV 22G X 1 AUTOGUARD

## (undated) DEVICE — GLOVE PROTEXIS PI SYN SURG 6.5

## (undated) DEVICE — TIP YANKAUERS BULB NO VENT

## (undated) DEVICE — NDL SPINAL CLR HUB 22GX4 3/4

## (undated) DEVICE — SET EXT CATH NONDEHP .8 6.5IN

## (undated) DEVICE — KIT IV START RUSH